# Patient Record
Sex: FEMALE | Race: ASIAN | NOT HISPANIC OR LATINO | URBAN - METROPOLITAN AREA
[De-identification: names, ages, dates, MRNs, and addresses within clinical notes are randomized per-mention and may not be internally consistent; named-entity substitution may affect disease eponyms.]

---

## 2017-05-06 RX ORDER — ALBUTEROL 90 UG/1
0 AEROSOL, METERED ORAL
Qty: 150 | Refills: 0 | COMMUNITY
Start: 2017-05-06

## 2017-05-10 ENCOUNTER — OUTPATIENT (OUTPATIENT)
Dept: OUTPATIENT SERVICES | Facility: HOSPITAL | Age: 82
LOS: 1 days | Discharge: HOME | End: 2017-05-10
Payer: MEDICARE

## 2017-05-10 DIAGNOSIS — R06.09 OTHER FORMS OF DYSPNEA: ICD-10-CM

## 2017-05-10 LAB
BUN SERPL-MCNC: 16 MG/DL — SIGNIFICANT CHANGE UP (ref 7–23)
CALCIUM SERPL-MCNC: 9.3 MG/DL — SIGNIFICANT CHANGE UP (ref 8.4–10.5)
CHLORIDE SERPL-SCNC: 103 MMOL/L — SIGNIFICANT CHANGE UP (ref 98–107)
CO2 SERPL-SCNC: 25 MMOL/L — SIGNIFICANT CHANGE UP (ref 22–31)
CREAT SERPL-MCNC: 1.15 MG/DL — SIGNIFICANT CHANGE UP (ref 0.5–1.3)
GLUCOSE SERPL-MCNC: 162 MG/DL — HIGH (ref 70–99)
HBA1C BLD-MCNC: 6.6 % — HIGH (ref 4–5.6)
HCT VFR BLD CALC: 31.7 % — LOW (ref 34.5–45)
HGB BLD-MCNC: 9.8 G/DL — LOW (ref 11.5–15.5)
MCHC RBC-ENTMCNC: 20.5 PG — LOW (ref 27–34)
MCHC RBC-ENTMCNC: 30.9 % — LOW (ref 32–36)
MCV RBC AUTO: 66.5 FL — LOW (ref 80–100)
PLATELET # BLD AUTO: 261 K/UL — SIGNIFICANT CHANGE UP (ref 150–400)
PMV BLD: 11 FL — SIGNIFICANT CHANGE UP (ref 7–13)
POTASSIUM SERPL-MCNC: 4.4 MMOL/L — SIGNIFICANT CHANGE UP (ref 3.5–5.3)
POTASSIUM SERPL-SCNC: 4.4 MMOL/L — SIGNIFICANT CHANGE UP (ref 3.5–5.3)
RBC # BLD: 4.77 M/UL — SIGNIFICANT CHANGE UP (ref 3.8–5.2)
RBC # FLD: 16.8 % — HIGH (ref 10.3–14.5)
SODIUM SERPL-SCNC: 142 MMOL/L — SIGNIFICANT CHANGE UP (ref 135–145)
WBC # BLD: 7.59 K/UL — SIGNIFICANT CHANGE UP (ref 3.8–10.5)
WBC # FLD AUTO: 7.59 K/UL — SIGNIFICANT CHANGE UP (ref 3.8–10.5)

## 2017-05-10 PROCEDURE — 93460 R&L HRT ART/VENTRICLE ANGIO: CPT | Mod: 26

## 2017-05-10 PROCEDURE — 93010 ELECTROCARDIOGRAM REPORT: CPT

## 2017-05-10 RX ORDER — INSULIN LISPRO 100/ML
VIAL (ML) SUBCUTANEOUS
Qty: 0 | Refills: 0 | Status: DISCONTINUED | OUTPATIENT
Start: 2017-05-10 | End: 2017-05-25

## 2017-05-10 RX ORDER — SODIUM CHLORIDE 9 MG/ML
1000 INJECTION, SOLUTION INTRAVENOUS
Qty: 0 | Refills: 0 | Status: DISCONTINUED | OUTPATIENT
Start: 2017-05-10 | End: 2017-05-25

## 2017-05-10 RX ORDER — DEXTROSE 50 % IN WATER 50 %
12.5 SYRINGE (ML) INTRAVENOUS ONCE
Qty: 0 | Refills: 0 | Status: DISCONTINUED | OUTPATIENT
Start: 2017-05-10 | End: 2017-05-25

## 2017-05-10 RX ORDER — GLUCAGON INJECTION, SOLUTION 0.5 MG/.1ML
1 INJECTION, SOLUTION SUBCUTANEOUS ONCE
Qty: 0 | Refills: 0 | Status: DISCONTINUED | OUTPATIENT
Start: 2017-05-10 | End: 2017-05-25

## 2017-05-10 RX ORDER — DEXTROSE 50 % IN WATER 50 %
25 SYRINGE (ML) INTRAVENOUS ONCE
Qty: 0 | Refills: 0 | Status: DISCONTINUED | OUTPATIENT
Start: 2017-05-10 | End: 2017-05-25

## 2017-05-10 RX ORDER — INSULIN LISPRO 100/ML
VIAL (ML) SUBCUTANEOUS AT BEDTIME
Qty: 0 | Refills: 0 | Status: DISCONTINUED | OUTPATIENT
Start: 2017-05-10 | End: 2017-05-25

## 2017-05-10 RX ORDER — DEXTROSE 50 % IN WATER 50 %
1 SYRINGE (ML) INTRAVENOUS ONCE
Qty: 0 | Refills: 0 | Status: DISCONTINUED | OUTPATIENT
Start: 2017-05-10 | End: 2017-05-25

## 2017-05-10 RX ORDER — ROSUVASTATIN CALCIUM 5 MG/1
1 TABLET ORAL
Qty: 0 | Refills: 0 | COMMUNITY

## 2017-05-10 RX ORDER — ROSUVASTATIN CALCIUM 5 MG/1
0 TABLET ORAL
Qty: 0 | Refills: 0 | COMMUNITY

## 2017-05-10 RX ORDER — SODIUM CHLORIDE 9 MG/ML
3 INJECTION INTRAMUSCULAR; INTRAVENOUS; SUBCUTANEOUS EVERY 8 HOURS
Qty: 0 | Refills: 0 | Status: DISCONTINUED | OUTPATIENT
Start: 2017-05-10 | End: 2017-05-25

## 2017-05-10 NOTE — H&P CARDIOLOGY - NEGATIVE CARDIOVASCULAR SYMPTOMS
no chest pain/no claudication/no palpitations/no paroxysmal nocturnal dyspnea/no peripheral edema/no orthopnea

## 2017-05-10 NOTE — H&P CARDIOLOGY - RS GEN PE MLT RESP DETAILS PC
breath sounds equal/no chest wall tenderness/clear to auscultation bilaterally/respirations non-labored/airway patent/good air movement

## 2017-05-10 NOTE — H&P CARDIOLOGY - HISTORY OF PRESENT ILLNESS
82 y/o F w/ PMH of mod. AS, HTN, HLD and Hypothyroid presents for cardiac catheretization. Pt states that for the past 3 weeks she has been more short of breath on exertion than normal. Pt can now only walk 5-10 steps before having to stop and rest. Pt's symptoms last 10 minutes after stoping to rest. Pt was found to have moderate AS on echo. Pt also had a recent stress test which was normal. Pt denies N/V/D, fevers, chills, cough, palpitations, chest pain, syncope, orthopnea, nocturnal paroxysmal dyspnea, edema, cyanosis, heart murmurs, varicosities, phlebitis, claudication.

## 2017-05-10 NOTE — H&P CARDIOLOGY - NEGATIVE NEUROLOGICAL SYMPTOMS
no loss of consciousness/no difficulty walking/no vertigo/no facial palsy/no transient paralysis/no weakness/no confusion/no syncope/no loss of sensation/no hemiparesis/no paresthesias/no generalized seizures/no focal seizures/no headache/no tremors

## 2017-07-01 ENCOUNTER — OUTPATIENT (OUTPATIENT)
Dept: OUTPATIENT SERVICES | Facility: HOSPITAL | Age: 82
LOS: 1 days | End: 2017-07-01
Payer: MEDICAID

## 2017-07-19 DIAGNOSIS — R69 ILLNESS, UNSPECIFIED: ICD-10-CM

## 2017-08-01 PROCEDURE — G9001: CPT

## 2017-09-28 ENCOUNTER — APPOINTMENT (OUTPATIENT)
Dept: RADIOLOGY | Facility: IMAGING CENTER | Age: 82
End: 2017-09-28
Payer: MEDICARE

## 2017-09-28 ENCOUNTER — OUTPATIENT (OUTPATIENT)
Dept: OUTPATIENT SERVICES | Facility: HOSPITAL | Age: 82
LOS: 1 days | End: 2017-09-28
Payer: COMMERCIAL

## 2017-09-28 DIAGNOSIS — Z00.8 ENCOUNTER FOR OTHER GENERAL EXAMINATION: ICD-10-CM

## 2017-09-28 PROBLEM — Z00.00 ENCOUNTER FOR PREVENTIVE HEALTH EXAMINATION: Status: ACTIVE | Noted: 2017-09-28

## 2017-09-28 PROCEDURE — 71020: CPT | Mod: 26

## 2017-09-28 PROCEDURE — 71046 X-RAY EXAM CHEST 2 VIEWS: CPT

## 2023-04-07 ENCOUNTER — INPATIENT (INPATIENT)
Facility: HOSPITAL | Age: 88
LOS: 7 days | Discharge: NURSING, REHAB OR LONG-TERM CARE | DRG: 202 | End: 2023-04-15
Attending: STUDENT IN AN ORGANIZED HEALTH CARE EDUCATION/TRAINING PROGRAM | Admitting: HOSPITALIST
Payer: COMMERCIAL

## 2023-04-07 VITALS
RESPIRATION RATE: 24 BRPM | HEIGHT: 62 IN | TEMPERATURE: 97 F | OXYGEN SATURATION: 100 % | DIASTOLIC BLOOD PRESSURE: 102 MMHG | WEIGHT: 119.93 LBS | HEART RATE: 108 BPM | SYSTOLIC BLOOD PRESSURE: 160 MMHG

## 2023-04-07 DIAGNOSIS — J45.901 UNSPECIFIED ASTHMA WITH (ACUTE) EXACERBATION: ICD-10-CM

## 2023-04-07 LAB
ALBUMIN SERPL ELPH-MCNC: 2.8 G/DL — LOW (ref 3.3–5)
ALP SERPL-CCNC: 76 U/L — SIGNIFICANT CHANGE UP (ref 40–120)
ALT FLD-CCNC: 7 U/L — LOW (ref 10–45)
ANION GAP SERPL CALC-SCNC: 4 MMOL/L — LOW (ref 5–17)
APPEARANCE UR: CLEAR — SIGNIFICANT CHANGE UP
APTT BLD: 31.7 SEC — SIGNIFICANT CHANGE UP (ref 27.5–35.5)
AST SERPL-CCNC: 9 U/L — LOW (ref 10–40)
BACTERIA # UR AUTO: ABNORMAL /HPF
BASE EXCESS BLDA CALC-SCNC: 3.1 MMOL/L — HIGH (ref -2–3)
BASOPHILS # BLD AUTO: 0.07 K/UL — SIGNIFICANT CHANGE UP (ref 0–0.2)
BASOPHILS NFR BLD AUTO: 0.5 % — SIGNIFICANT CHANGE UP (ref 0–2)
BILIRUB SERPL-MCNC: 0.2 MG/DL — SIGNIFICANT CHANGE UP (ref 0.2–1.2)
BILIRUB UR-MCNC: NEGATIVE — SIGNIFICANT CHANGE UP
BUN SERPL-MCNC: 32 MG/DL — HIGH (ref 7–23)
BUN SERPL-MCNC: 33 MG/DL — HIGH (ref 7–23)
CALCIUM SERPL-MCNC: 8.6 MG/DL — SIGNIFICANT CHANGE UP (ref 8.4–10.5)
CALCIUM SERPL-MCNC: 8.7 MG/DL — SIGNIFICANT CHANGE UP (ref 8.4–10.5)
CHLORIDE SERPL-SCNC: 94 MMOL/L — LOW (ref 96–108)
CO2 BLDA-SCNC: 29 MMOL/L — HIGH (ref 19–24)
CO2 SERPL-SCNC: 32 MMOL/L — HIGH (ref 22–31)
COLOR SPEC: YELLOW — SIGNIFICANT CHANGE UP
CREAT SERPL-MCNC: 1.64 MG/DL — HIGH (ref 0.5–1.3)
CREAT SERPL-MCNC: 1.66 MG/DL — HIGH (ref 0.5–1.3)
D DIMER BLD IA.RAPID-MCNC: 506 NG/ML DDU — HIGH
DIFF PNL FLD: ABNORMAL
EGFR: 29 ML/MIN/1.73M2 — LOW
EGFR: 30 ML/MIN/1.73M2 — LOW
EOSINOPHIL # BLD AUTO: 0.16 K/UL — SIGNIFICANT CHANGE UP (ref 0–0.5)
EOSINOPHIL NFR BLD AUTO: 1.2 % — SIGNIFICANT CHANGE UP (ref 0–6)
EPI CELLS # UR: SIGNIFICANT CHANGE UP
FLUAV AG NPH QL: SIGNIFICANT CHANGE UP
FLUBV AG NPH QL: SIGNIFICANT CHANGE UP
GAS PNL BLDA: SIGNIFICANT CHANGE UP
GLUCOSE BLDC GLUCOMTR-MCNC: 142 MG/DL — HIGH (ref 70–99)
GLUCOSE SERPL-MCNC: 108 MG/DL — HIGH (ref 70–99)
GLUCOSE SERPL-MCNC: 121 MG/DL — HIGH (ref 70–99)
GLUCOSE UR QL: NEGATIVE — SIGNIFICANT CHANGE UP
HCO3 BLDA-SCNC: 28 MMOL/L — SIGNIFICANT CHANGE UP (ref 21–28)
HCT VFR BLD CALC: 27.5 % — LOW (ref 34.5–45)
HGB BLD-MCNC: 8.4 G/DL — LOW (ref 11.5–15.5)
HOROWITZ INDEX BLDA+IHG-RTO: 40 — SIGNIFICANT CHANGE UP
IMM GRANULOCYTES NFR BLD AUTO: 0.6 % — SIGNIFICANT CHANGE UP (ref 0–0.9)
INR BLD: 0.95 RATIO — SIGNIFICANT CHANGE UP (ref 0.88–1.16)
KETONES UR-MCNC: NEGATIVE — SIGNIFICANT CHANGE UP
LACTATE SERPL-SCNC: 0.9 MMOL/L — SIGNIFICANT CHANGE UP (ref 0.7–2)
LEUKOCYTE ESTERASE UR-ACNC: ABNORMAL
LYMPHOCYTES # BLD AUTO: 1.57 K/UL — SIGNIFICANT CHANGE UP (ref 1–3.3)
LYMPHOCYTES # BLD AUTO: 12.1 % — LOW (ref 13–44)
MCHC RBC-ENTMCNC: 20.3 PG — LOW (ref 27–34)
MCHC RBC-ENTMCNC: 30.5 GM/DL — LOW (ref 32–36)
MCV RBC AUTO: 66.4 FL — LOW (ref 80–100)
MONOCYTES # BLD AUTO: 0.71 K/UL — SIGNIFICANT CHANGE UP (ref 0–0.9)
MONOCYTES NFR BLD AUTO: 5.5 % — SIGNIFICANT CHANGE UP (ref 2–14)
NEUTROPHILS # BLD AUTO: 10.4 K/UL — HIGH (ref 1.8–7.4)
NEUTROPHILS NFR BLD AUTO: 80.1 % — HIGH (ref 43–77)
NITRITE UR-MCNC: NEGATIVE — SIGNIFICANT CHANGE UP
NRBC # BLD: 0 /100 WBCS — SIGNIFICANT CHANGE UP (ref 0–0)
NT-PROBNP SERPL-SCNC: 8882 PG/ML — HIGH (ref 0–300)
PCO2 BLDA: 45 MMHG — HIGH (ref 32–35)
PH BLDA: 7.4 — SIGNIFICANT CHANGE UP (ref 7.35–7.45)
PH UR: 7 — SIGNIFICANT CHANGE UP (ref 5–8)
PLATELET # BLD AUTO: 358 K/UL — SIGNIFICANT CHANGE UP (ref 150–400)
PO2 BLDA: 196 MMHG — HIGH (ref 83–108)
POTASSIUM SERPL-MCNC: 5.5 MMOL/L — HIGH (ref 3.5–5.3)
POTASSIUM SERPL-MCNC: 5.7 MMOL/L — HIGH (ref 3.5–5.3)
POTASSIUM SERPL-SCNC: 5.5 MMOL/L — HIGH (ref 3.5–5.3)
POTASSIUM SERPL-SCNC: 5.7 MMOL/L — HIGH (ref 3.5–5.3)
PROT SERPL-MCNC: 6.5 G/DL — SIGNIFICANT CHANGE UP (ref 6–8.3)
PROT UR-MCNC: 30 MG/DL
PROTHROM AB SERPL-ACNC: 11 SEC — SIGNIFICANT CHANGE UP (ref 10.5–13.4)
RBC # BLD: 4.14 M/UL — SIGNIFICANT CHANGE UP (ref 3.8–5.2)
RBC # FLD: 17.4 % — HIGH (ref 10.3–14.5)
RBC CASTS # UR COMP ASSIST: ABNORMAL /HPF (ref 0–4)
RSV RNA NPH QL NAA+NON-PROBE: DETECTED
SAO2 % BLDA: 99.7 % — HIGH (ref 94–98)
SARS-COV-2 RNA SPEC QL NAA+PROBE: SIGNIFICANT CHANGE UP
SODIUM SERPL-SCNC: 130 MMOL/L — LOW (ref 135–145)
SP GR SPEC: 1.01 — SIGNIFICANT CHANGE UP (ref 1.01–1.02)
TROPONIN I, HIGH SENSITIVITY RESULT: 18.9 NG/L — SIGNIFICANT CHANGE UP
UROBILINOGEN FLD QL: NEGATIVE — SIGNIFICANT CHANGE UP
WBC # BLD: 12.99 K/UL — HIGH (ref 3.8–10.5)
WBC # FLD AUTO: 12.99 K/UL — HIGH (ref 3.8–10.5)
WBC UR QL: ABNORMAL /HPF (ref 0–5)

## 2023-04-07 PROCEDURE — 93010 ELECTROCARDIOGRAM REPORT: CPT

## 2023-04-07 PROCEDURE — 99222 1ST HOSP IP/OBS MODERATE 55: CPT

## 2023-04-07 PROCEDURE — 99285 EMERGENCY DEPT VISIT HI MDM: CPT

## 2023-04-07 PROCEDURE — 71045 X-RAY EXAM CHEST 1 VIEW: CPT | Mod: 26

## 2023-04-07 RX ORDER — DEXTROSE 50 % IN WATER 50 %
25 SYRINGE (ML) INTRAVENOUS ONCE
Refills: 0 | Status: DISCONTINUED | OUTPATIENT
Start: 2023-04-07 | End: 2023-04-15

## 2023-04-07 RX ORDER — LEVOTHYROXINE SODIUM 125 MCG
25 TABLET ORAL DAILY
Refills: 0 | Status: DISCONTINUED | OUTPATIENT
Start: 2023-04-07 | End: 2023-04-15

## 2023-04-07 RX ORDER — APIXABAN 2.5 MG/1
2.5 TABLET, FILM COATED ORAL EVERY 12 HOURS
Refills: 0 | Status: DISCONTINUED | OUTPATIENT
Start: 2023-04-07 | End: 2023-04-09

## 2023-04-07 RX ORDER — DEXTROSE 50 % IN WATER 50 %
12.5 SYRINGE (ML) INTRAVENOUS ONCE
Refills: 0 | Status: DISCONTINUED | OUTPATIENT
Start: 2023-04-07 | End: 2023-04-15

## 2023-04-07 RX ORDER — ONDANSETRON 8 MG/1
4 TABLET, FILM COATED ORAL EVERY 8 HOURS
Refills: 0 | Status: DISCONTINUED | OUTPATIENT
Start: 2023-04-07 | End: 2023-04-15

## 2023-04-07 RX ORDER — ASPIRIN/CALCIUM CARB/MAGNESIUM 324 MG
81 TABLET ORAL DAILY
Refills: 0 | Status: DISCONTINUED | OUTPATIENT
Start: 2023-04-07 | End: 2023-04-07

## 2023-04-07 RX ORDER — DEXAMETHASONE 0.5 MG/5ML
10 ELIXIR ORAL ONCE
Refills: 0 | Status: COMPLETED | OUTPATIENT
Start: 2023-04-07 | End: 2023-04-07

## 2023-04-07 RX ORDER — INSULIN LISPRO 100/ML
VIAL (ML) SUBCUTANEOUS AT BEDTIME
Refills: 0 | Status: DISCONTINUED | OUTPATIENT
Start: 2023-04-07 | End: 2023-04-15

## 2023-04-07 RX ORDER — INSULIN LISPRO 100/ML
VIAL (ML) SUBCUTANEOUS
Refills: 0 | Status: DISCONTINUED | OUTPATIENT
Start: 2023-04-07 | End: 2023-04-15

## 2023-04-07 RX ORDER — LANOLIN ALCOHOL/MO/W.PET/CERES
3 CREAM (GRAM) TOPICAL AT BEDTIME
Refills: 0 | Status: DISCONTINUED | OUTPATIENT
Start: 2023-04-07 | End: 2023-04-15

## 2023-04-07 RX ORDER — AMLODIPINE BESYLATE 2.5 MG/1
5 TABLET ORAL DAILY
Refills: 0 | Status: DISCONTINUED | OUTPATIENT
Start: 2023-04-07 | End: 2023-04-15

## 2023-04-07 RX ORDER — FUROSEMIDE 40 MG
20 TABLET ORAL ONCE
Refills: 0 | Status: COMPLETED | OUTPATIENT
Start: 2023-04-07 | End: 2023-04-07

## 2023-04-07 RX ORDER — IPRATROPIUM/ALBUTEROL SULFATE 18-103MCG
3 AEROSOL WITH ADAPTER (GRAM) INHALATION
Refills: 0 | Status: COMPLETED | OUTPATIENT
Start: 2023-04-07 | End: 2023-04-07

## 2023-04-07 RX ORDER — ALBUTEROL 90 UG/1
1 AEROSOL, METERED ORAL EVERY 4 HOURS
Refills: 0 | Status: DISCONTINUED | OUTPATIENT
Start: 2023-04-07 | End: 2023-04-13

## 2023-04-07 RX ORDER — ATORVASTATIN CALCIUM 80 MG/1
20 TABLET, FILM COATED ORAL AT BEDTIME
Refills: 0 | Status: DISCONTINUED | OUTPATIENT
Start: 2023-04-07 | End: 2023-04-15

## 2023-04-07 RX ORDER — ALBUTEROL 90 UG/1
2.5 AEROSOL, METERED ORAL EVERY 6 HOURS
Refills: 0 | Status: DISCONTINUED | OUTPATIENT
Start: 2023-04-07 | End: 2023-04-15

## 2023-04-07 RX ORDER — ACETAMINOPHEN 500 MG
650 TABLET ORAL EVERY 6 HOURS
Refills: 0 | Status: DISCONTINUED | OUTPATIENT
Start: 2023-04-07 | End: 2023-04-15

## 2023-04-07 RX ORDER — SODIUM CHLORIDE 9 MG/ML
1000 INJECTION, SOLUTION INTRAVENOUS
Refills: 0 | Status: DISCONTINUED | OUTPATIENT
Start: 2023-04-07 | End: 2023-04-15

## 2023-04-07 RX ORDER — GLUCAGON INJECTION, SOLUTION 0.5 MG/.1ML
1 INJECTION, SOLUTION SUBCUTANEOUS ONCE
Refills: 0 | Status: DISCONTINUED | OUTPATIENT
Start: 2023-04-07 | End: 2023-04-15

## 2023-04-07 RX ORDER — DEXTROSE 50 % IN WATER 50 %
15 SYRINGE (ML) INTRAVENOUS ONCE
Refills: 0 | Status: DISCONTINUED | OUTPATIENT
Start: 2023-04-07 | End: 2023-04-15

## 2023-04-07 RX ADMIN — ATORVASTATIN CALCIUM 20 MILLIGRAM(S): 80 TABLET, FILM COATED ORAL at 23:17

## 2023-04-07 RX ADMIN — ALBUTEROL 2.5 MILLIGRAM(S): 90 AEROSOL, METERED ORAL at 22:00

## 2023-04-07 RX ADMIN — Medication 20 MILLIGRAM(S): at 16:14

## 2023-04-07 RX ADMIN — Medication 3 MILLILITER(S): at 17:22

## 2023-04-07 RX ADMIN — Medication 102 MILLIGRAM(S): at 16:14

## 2023-04-07 RX ADMIN — Medication 3 MILLILITER(S): at 16:15

## 2023-04-07 NOTE — ED ADULT NURSE NOTE - INTERVENTIONS DEFINITIONS
[place] to call system/Call bell, personal items and telephone within reach/Instruct patient to call for assistance/Physically safe environment: no spills, clutter or unnecessary equipment/Provide visual cue, wrist band, yellow gown, etc./Monitor for mental status changes and reorient to person, place, and time [place] to call system/Call bell, personal items and telephone within reach/Instruct patient to call for assistance/Physically safe environment: no spills, clutter or unnecessary equipment/Provide visual cue, wrist band, yellow gown, etc./Monitor for mental status changes and reorient to person, place, and time [place] to call system/Call bell, personal items and telephone within reach/Instruct patient to call for assistance/Physically safe environment: no spills, clutter or unnecessary equipment/Provide visual cue, wrist band, yellow gown, etc./Monitor for mental status changes and reorient to person, place, and time

## 2023-04-07 NOTE — H&P ADULT - ASSESSMENT
89F (Gujarati-speaking) with HTN, asthma, DM2, hypothyroidism, recent hospitalization in [place] for "trouble breathing", returned from [place] 5 days ago, comes to the ED with SOB, diagnosed with asthma exacerbation secondary to RSV    #Possible acute hypoxic respiratory failure  #Asthma exacerbation  #RSV infection  -Will check CT chest non-contrast as the CXR does not show any evidence of acute pulmonary disease  -IV Solumedrol for now  -Would change to PO steroid in 24-48 hours as long as lung exam is improved  -Albuterol nebs q6h for now  -However, will also check d-dimer... if d-dimer is positive, would recommend V/Q scan - (patient recently on plane ride from [place])    #Essential HTN  -c/w Norvasc, Clonidine    #Hypothyroidism  -c/w Synthroid  -check TSH    #HLD  -c/w statin    #DM2  -hold oral meds (Metformin, Glimeperide)  -start ISS  -POCT glucose testing  -Check A1C    #PREM vs CKD3  -Prior Cr 1.15 (2017) and 1.55 (2022)  -Unclear if we are dealing with PREM on CKD3, vs if this is the new baseline  -Hold Metformin  -Repeat BMP in AM    #Anemia of chronic diseaes  -Prior Hb 8-9  -Stable at this time    #DVT ppx: HSQ 89F (Gujarati-speaking) with HTN, asthma, DM2, hypothyroidism, recent hospitalization in [place] for "trouble breathing", returned from [place] 5 days ago, comes to the ED with SOB, diagnosed with asthma exacerbation secondary to RSV    #Possible acute hypoxic respiratory failure  #Asthma exacerbation  #RSV infection  -Will check CT chest non-contrast as the CXR does not show any evidence of acute pulmonary disease  -PO steroids for now (got IV in the ED).... taper schedule to be determined  -Would change to PO steroid in 24-48 hours as long as lung exam is improved  -Albuterol nebs q6h for now  -However, will also check d-dimer... if d-dimer is positive, would recommend V/Q scan - (patient recently on plane ride from [place])    #Essential HTN  -c/w Norvasc, Clonidine    #Hypothyroidism  -c/w Synthroid  -check TSH    #Bilateral leg swelling  -Check US to r/o DVT (recent plane ride)  -Could be from low albumen state    #HLD  -c/w statin    #DM2  -hold oral meds (Metformin, Glimeperide)  -start ISS  -POCT glucose testing  -Check A1C    #PREM vs CKD3  -Prior Cr 1.15 (2017) and 1.55 (2022)  -Unclear if we are dealing with PREM on CKD3, vs if this is the new baseline  -Hold Metformin  -Repeat BMP in AM    #Anemia of chronic diseaes  -Prior Hb 8-9  -Stable at this time    #DVT ppx: HSQ 89F (Gujarati-speaking) with HTN, asthma, DM2, hypothyroidism, recent hospitalization in [place] for "trouble breathing", returned from [place] 5 days ago, comes to the ED with SOB, diagnosed with asthma exacerbation secondary to RSV    #Possible acute hypoxic respiratory failure  #Asthma exacerbation  #RSV infection  -Will check CT chest non-contrast as the CXR does not show any evidence of acute pulmonary disease  -PO steroids for now (got IV in the ED).... taper schedule to be determined  -Would change to PO steroid in 24-48 hours as long as lung exam is improved  -Albuterol nebs q6h for now  -However, will also check d-dimer... if d-dimer is positive, would recommend V/Q scan - (patient recently on plane ride from [place])    #New A-fib  -Will start Eliquis  -Cardio consult  -Echo  -troponin  -d-dimer... if positive, would consider V/Q... although can start with leg dopplers and echo to assess for RV strain - will already be on A/C regardless    #Essential HTN  -c/w Norvasc, Clonidine    #Hypothyroidism  -c/w Synthroid  -check TSH    #Hyperkalemia  -Mild, repeat BMP, no EKG changes  -If remains high, will give one dose of Lokelma     #Bilateral leg swelling  -Check US to r/o DVT (recent plane ride)  -Could be from low albumen state  -does not appear to be in overt heart failure at this time despite elevated pro-BNP    #HLD  -c/w statin    #DM2  -hold oral meds (Metformin, Glimeperide)  -start ISS  -POCT glucose testing  -Check A1C    #PREM vs CKD3  -Prior Cr 1.15 (2017) and 1.55 (2022)  -Unclear if we are dealing with PREM on CKD3, vs if this is the new baseline  -Hold Metformin  -Repeat BMP in AM    #Anemia of chronic disease  -Prior Hb 8-9  -Stable at this time    #DVT ppx: Eliquis    Case d/w patient's PMD, Dr. Merchant, [phone], who is involved in her care and would appreciate any updates  Case d/w patient's son, Martin Santana, [phone]    FULL CODE at this time 89F (Gujarati-speaking) with HTN, asthma, DM2, hypothyroidism, recent hospitalization in [place] for "trouble breathing", returned from [place] 5 days ago, comes to the ED with SOB, diagnosed with asthma exacerbation secondary to RSV    #Possible acute hypoxic respiratory failure  #Asthma exacerbation  #RSV infection  -Will check CT chest non-contrast as the CXR does not show any evidence of acute pulmonary disease  -PO steroids for now (got IV in the ED).... taper schedule to be determined  -Would change to PO steroid in 24-48 hours as long as lung exam is improved  -Albuterol nebs q6h for now  -However, will also check d-dimer... if d-dimer is positive, would recommend V/Q scan - (patient recently on plane ride from [place])    #New A-fib  -Will start Eliquis  -Cardio consult  -Echo  -troponin  -d-dimer... if positive, would consider V/Q... although can start with leg dopplers and echo to assess for RV strain - will already be on A/C regardless    #Essential HTN  -c/w Norvasc, Clonidine    #Hypothyroidism  -c/w Synthroid  -check TSH    #Hyperkalemia  -Mild, repeat BMP, no EKG changes  -If remains high, will give one dose of Lokelma     #Bilateral leg swelling  -Check US to r/o DVT (recent plane ride)  -Could be from low albumen state  -does not appear to be in overt heart failure at this time despite elevated pro-BNP    #HLD  -c/w statin    #DM2  -hold oral meds (Metformin, Glimeperide)  -start ISS  -POCT glucose testing  -Check A1C    #PREM vs CKD3  -Prior Cr 1.15 (2017) and 1.55 (2022)  -Unclear if we are dealing with PREM on CKD3, vs if this is the new baseline  -Hold Metformin  -Repeat BMP in AM    #Anemia of chronic disease  -Prior Hb 8-9  -Stable at this time    #DVT ppx: Eliquis    Case d/w patient's PMD, Dr. Merchant, [phone], who is involved in her care and would appreciate any updates  Case d/w patient's son, Martin Santana, [phone]    FULL CODE at this time 89F (Gujarati-speaking) with HTN, asthma, DM2, hypothyroidism, recent hospitalization in [place] for "trouble breathing", returned from [place] 5 days ago, comes to the ED with SOB, diagnosed with asthma exacerbation secondary to RSV    #Possible acute hypoxic respiratory failure  #Asthma exacerbation  #RSV infection  -Will check CT chest non-contrast as the CXR does not show any evidence of acute pulmonary disease  -PO steroids for now (got IV in the ED).... taper schedule to be determined  -Would change to PO steroid in 24-48 hours as long as lung exam is improved  -Albuterol nebs q6h for now  -However, will also check d-dimer... if d-dimer is positive, would recommend V/Q scan - (patient recently on plane ride from [place])    #New A-fib  -Will start Eliquis  -Cardio consult  -Echo  -troponin  -d-dimer... if positive, would consider V/Q... although can start with leg dopplers and echo to assess for RV strain - will already be on A/C regardless    #Essential HTN  -c/w Norvasc, Clonidine    #Hypothyroidism  -c/w Synthroid  -check TSH    #Hyperkalemia  -Mild, repeat BMP, no EKG changes  -If remains high, will give one dose of Lokelma     #Bilateral leg swelling  -Check US to r/o DVT (recent plane ride)  -Could be from low albumen state  -does not appear to be in overt heart failure at this time despite elevated pro-BNP    #HLD  -c/w statin    #DM2  -hold oral meds (Metformin, Glimeperide)  -start ISS  -POCT glucose testing  -Check A1C    #PREM vs CKD3  -Prior Cr 1.15 (2017) and 1.55 (2022)  -Unclear if we are dealing with PREM on CKD3, vs if this is the new baseline  -Hold Metformin  -Repeat BMP in AM    #Anemia of chronic disease  -Prior Hb 8-9  -Stable at this time    #DVT ppx: Eliquis    Case d/w patient's PMD, Dr. Merchant, [phone], who is involved in her care and would appreciate any updates  Case d/w patient's son, Martin Santana, [phone]    FULL CODE at this time 89F (Gujarati-speaking) with HTN, asthma, DM2, hypothyroidism, recent hospitalization in [place] for "trouble breathing", returned from [place] 5 days ago, comes to the ED with SOB, diagnosed with asthma exacerbation secondary to RSV    #Possible acute hypoxic respiratory failure  #Asthma exacerbation  #RSV infection  -Will check CT chest non-contrast as the CXR does not show any evidence of acute pulmonary disease  -PO steroids for now (got IV in the ED).... taper schedule to be determined  -Would change to PO steroid in 24-48 hours as long as lung exam is improved  -Albuterol nebs q6h for now  -However, will also check d-dimer... if d-dimer is positive, would recommend V/Q scan - (patient recently on plane ride from [place])    #New A-fib  -Will start Eliquis (will stop ASA for now, no hx of CAD or stroke, risk of bleeding on ASA + Eliquis > benefits)  -Cardio consult  -Echo  -troponin  -d-dimer... if positive, would consider V/Q... although can start with leg dopplers and echo to assess for RV strain - will already be on A/C regardless    #Essential HTN  -c/w Norvasc, Clonidine    #Hypothyroidism  -c/w Synthroid  -check TSH    #Hyperkalemia  -Mild, repeat BMP, no EKG changes  -If remains high, will give one dose of Lokelma     #Bilateral leg swelling  -Check US to r/o DVT (recent plane ride)  -Could be from low albumen state  -does not appear to be in overt heart failure at this time despite elevated pro-BNP    #HLD  -c/w statin    #DM2  -hold oral meds (Metformin, Glimeperide)  -start ISS  -POCT glucose testing  -Check A1C    #PREM vs CKD3  -Prior Cr 1.15 (2017) and 1.55 (2022)  -Unclear if we are dealing with PREM on CKD3, vs if this is the new baseline  -Hold Metformin  -Repeat BMP in AM    #Anemia of chronic disease  -Prior Hb 8-9  -Stable at this time    #DVT ppx: Eliquis    Case d/w patient's PMD, Dr. Merchant, [phone], who is involved in her care and would appreciate any updates  Case d/w patient's son, Martin Santana, [phone]    FULL CODE at this time 89F (Gujarati-speaking) with HTN, asthma, DM2, hypothyroidism, recent hospitalization in [place] for "trouble breathing", returned from [place] 5 days ago, comes to the ED with SOB, diagnosed with asthma exacerbation secondary to RSV    #Possible acute hypoxic respiratory failure  #Asthma exacerbation  #RSV infection  -Will check CT chest non-contrast as the CXR does not show any evidence of acute pulmonary disease  -PO steroids for now (got IV in the ED).... taper schedule to be determined  -Would change to PO steroid in 24-48 hours as long as lung exam is improved  -Albuterol nebs q6h for now  -However, will also check d-dimer... if d-dimer is positive, would recommend V/Q scan - (patient recently on plane ride from [place])    #New A-fib  -Will start Eliquis (will stop ASA for now, no hx of CAD or stroke, risk of bleeding on ASA + Eliquis > benefits)  -Cardio consult  -Echo  -troponin  -d-dimer... if positive, would consider V/Q... although can start with leg dopplers and echo to assess for RV strain - will already be on A/C regardless    #Essential HTN  -c/w Norvasc, Clonidine    #Hypothyroidism  -c/w Synthroid  -check TSH    #Hyperkalemia  -Mild, repeat BMP, no EKG changes  -If remains high, will give one dose of Lokelma     #Bilateral leg swelling  -Check US to r/o DVT (recent plane ride)  -Could be from low albumen state  -does not appear to be in overt heart failure at this time despite elevated pro-BNP    #HLD  -c/w statin    #DM2  -hold oral meds (Metformin, Glimeperide)  -start ISS  -POCT glucose testing  -Check A1C    #PREM vs CKD3  -Prior Cr 1.15 (2017) and 1.55 (2022)  -Unclear if we are dealing with PREM on CKD3, vs if this is the new baseline  -Hold Metformin  -Repeat BMP in AM    #Anemia of chronic disease  -Prior Hb 8-9  -Stable at this time    #DVT ppx: Eliquis    Case d/w patient's PMD, Dr. Merchant, [phone], who is involved in her care and would appreciate any updates  Case d/w patient's son, Martin Santana, [phone]    FULL CODE at this time 89F (Gujarati-speaking) with HTN, asthma, DM2, hypothyroidism, recent hospitalization in [place] for "trouble breathing", returned from [place] 5 days ago, comes to the ED with SOB, diagnosed with asthma exacerbation secondary to RSV    #Possible acute hypoxic respiratory failure  #Asthma exacerbation  #RSV infection  -Will check CT chest non-contrast as the CXR does not show any evidence of acute pulmonary disease  -PO steroids for now (got IV in the ED).... taper schedule to be determined  -Would change to PO steroid in 24-48 hours as long as lung exam is improved  -Albuterol nebs q6h for now  -However, will also check d-dimer... if d-dimer is positive, would recommend V/Q scan - (patient recently on plane ride from [place])    #New A-fib  -Will start Eliquis (will stop ASA for now, no hx of CAD or stroke, risk of bleeding on ASA + Eliquis > benefits)  -Cardio consult  -Echo  -troponin  -d-dimer... if positive, would consider V/Q... although can start with leg dopplers and echo to assess for RV strain - will already be on A/C regardless    #Essential HTN  -c/w Norvasc, Clonidine    #Hypothyroidism  -c/w Synthroid  -check TSH    #Hyperkalemia  -Mild, repeat BMP, no EKG changes  -If remains high, will give one dose of Lokelma     #Bilateral leg swelling  -Check US to r/o DVT (recent plane ride)  -Could be from low albumen state  -does not appear to be in overt heart failure at this time despite elevated pro-BNP    #HLD  -c/w statin    #DM2  -hold oral meds (Metformin, Glimeperide)  -start ISS  -POCT glucose testing  -Check A1C    #PREM vs CKD3  -Prior Cr 1.15 (2017) and 1.55 (2022)  -Unclear if we are dealing with PREM on CKD3, vs if this is the new baseline  -Hold Metformin  -Repeat BMP in AM    #Anemia of chronic disease  -Prior Hb 8-9  -Stable at this time    #DVT ppx: Eliquis    Case d/w patient's PMD, Dr. Merchant, [phone], who is involved in her care and would appreciate any updates  Case d/w patient's son, Martin Santana, [phone]    FULL CODE at this time 89F (Gujarati-speaking) with HTN, asthma, DM2, hypothyroidism, recent hospitalization in [place] for "trouble breathing", returned from [place] 5 days ago, comes to the ED with SOB, diagnosed with asthma exacerbation secondary to RSV    #Asthma exacerbation  #RSV infection  -Patient satting 100% on RA on my exam  -Will check CT chest non-contrast as the CXR does not show any evidence of acute pulmonary disease, but has been hospitalized twice now (here, and recently in [place]) for acute respiratory distress  -PO steroids for now (got IV in the ED).... taper schedule to be determined  -Would change to PO steroid in 24-48 hours as long as lung exam is improved  -Albuterol nebs q6h for now  -However, will also check d-dimer... if d-dimer is positive, would recommend V/Q scan - (patient recently on plane ride from [place])    #New A-fib  -Will start Eliquis (will stop ASA for now, no hx of CAD or stroke, risk of bleeding on ASA + Eliquis > benefits)  -Cardio consult  -Echo  -troponin  -d-dimer... if positive, would consider V/Q... although can start with leg dopplers and echo to assess for RV strain - will already be on A/C regardless    #Essential HTN  -c/w Norvasc, Clonidine    #Hypothyroidism  -c/w Synthroid  -check TSH    #Hyperkalemia  -Mild, repeat BMP, no EKG changes  -If remains high, will give one dose of Lokelma     #Bilateral leg swelling  -Check US to r/o DVT (recent plane ride)  -Could be from low albumen state  -does not appear to be in overt heart failure at this time despite elevated pro-BNP    #HLD  -c/w statin    #DM2  -hold oral meds (Metformin, Glimeperide)  -start ISS  -POCT glucose testing  -Check A1C    #PREM vs CKD3  -Prior Cr 1.15 (2017) and 1.55 (2022)  -Unclear if we are dealing with PREM on CKD3, vs if this is the new baseline  -Hold Metformin  -Repeat BMP in AM    #Anemia of chronic disease  -Prior Hb 8-9  -Stable at this time    #DVT ppx: Eliquis    Case d/w patient's PMD, Dr. Merchant, [phone], who is involved in her care and would appreciate any updates  Case d/w patient's son, Martin Santana, [phone]    FULL CODE at this time 89F (Gujarati-speaking) with HTN, asthma, DM2, hypothyroidism, recent hospitalization in [place] for "trouble breathing", returned from [place] 5 days ago, comes to the ED with SOB, diagnosed with asthma exacerbation secondary to RSV    #Asthma exacerbation  #RSV infection  -Patient satting 100% on RA on my exam  -Will check CT chest non-contrast as the CXR does not show any evidence of acute pulmonary disease, but has been hospitalized twice now (here, and recently in [place]) for acute respiratory distress  -PO steroids for now (got IV in the ED).... taper schedule to be determined  -Would change to PO steroid in 24-48 hours as long as lung exam is improved  -Albuterol nebs q6h for now  -However, will also check d-dimer... if d-dimer is positive, would recommend V/Q scan - (patient recently on plane ride from [place])    #New A-fib  -Will start Eliquis (will stop ASA for now, no hx of CAD or stroke, risk of bleeding on ASA + Eliquis > benefits)  -Cardio consult  -Echo  -troponin  -d-dimer... if positive, would consider V/Q... although can start with leg dopplers and echo to assess for RV strain - will already be on A/C regardless    #Essential HTN  -c/w Norvasc, Clonidine    #Hypothyroidism  -c/w Synthroid  -check TSH    #Hyperkalemia  -Mild, repeat BMP, no EKG changes  -If remains high, will give one dose of Lokelma     #Bilateral leg swelling  -Check US to r/o DVT (recent plane ride)  -Could be from low albumen state  -does not appear to be in overt heart failure at this time despite elevated pro-BNP    #HLD  -c/w statin    #DM2  -hold oral meds (Metformin, Glimeperide)  -start ISS  -POCT glucose testing  -Check A1C    #PREM vs CKD3  -Prior Cr 1.15 (2017) and 1.55 (2022)  -Unclear if we are dealing with PREM on CKD3, vs if this is the new baseline  -Hold Metformin  -Repeat BMP in AM    #Anemia of chronic disease  -Prior Hb 8-9  -Stable at this time    #DVT ppx: Eliquis    Case d/w patient's PMD, Dr. Merchant, [phone], who is involved in her care and would appreciate any updates  Case d/w patient's son, Martin Santana, [phone]    FULL CODE at this time 89F (Gujarati-speaking) with HTN, asthma, DM2, hypothyroidism, recent hospitalization in [place] for "trouble breathing", returned from [place] 5 days ago, comes to the ED with SOB, diagnosed with asthma exacerbation secondary to RSV    #Asthma exacerbation  #RSV infection  -Patient satting 100% on RA on my exam  -Will check CT chest non-contrast as the CXR does not show any evidence of acute pulmonary disease, but has been hospitalized twice now (here, and recently in [place]) for acute respiratory distress  -PO steroids for now (got IV in the ED).... taper schedule to be determined  -Would change to PO steroid in 24-48 hours as long as lung exam is improved  -Albuterol nebs q6h for now  -However, will also check d-dimer... if d-dimer is positive, would recommend V/Q scan - (patient recently on plane ride from [place])    #New A-fib  -Will start Eliquis (will stop ASA for now, no hx of CAD or stroke, risk of bleeding on ASA + Eliquis > benefits)  -Cardio consult  -Echo  -troponin  -d-dimer... if positive, would consider V/Q... although can start with leg dopplers and echo to assess for RV strain - will already be on A/C regardless    #Essential HTN  -c/w Norvasc, Clonidine    #Hypothyroidism  -c/w Synthroid  -check TSH    #Hyperkalemia  -Mild, repeat BMP, no EKG changes  -If remains high, will give one dose of Lokelma     #Bilateral leg swelling  -Check US to r/o DVT (recent plane ride)  -Could be from low albumen state  -does not appear to be in overt heart failure at this time despite elevated pro-BNP    #HLD  -c/w statin    #DM2  -hold oral meds (Metformin, Glimeperide)  -start ISS  -POCT glucose testing  -Check A1C    #PREM vs CKD3  -Prior Cr 1.15 (2017) and 1.55 (2022)  -Unclear if we are dealing with PREM on CKD3, vs if this is the new baseline  -Hold Metformin  -Repeat BMP in AM    #Anemia of chronic disease  -Prior Hb 8-9  -Stable at this time    #DVT ppx: Eliquis    Case d/w patient's PMD, Dr. Merchant, [phone], who is involved in her care and would appreciate any updates  Case d/w patient's son, Martin Santana, [phone]    FULL CODE at this time

## 2023-04-07 NOTE — ED PROVIDER NOTE - PHYSICAL EXAMINATION
General:     In respiratory distress on CPAP by the EMS  Head:     NC/AT, EOMI, oral mucosa moist  Neck:     trachea midline  Lungs:    bilateral wheezing  CVS:     S1S2, RRR, no m/g/r  Abd:     +BS, s/nt/nd, no organomegaly  Ext:    2+ radial and pedal pulses, no c/c/e  Neuro: AAOx3, no sensory/motor deficits

## 2023-04-07 NOTE — H&P ADULT - NSHPOUTPATIENTPROVIDERS_GEN_ALL_CORE
Dr. Yovany Merchant - [phone] - notified Dr. Yovany Merchant - [phone] - notified Dr. Yovany Merchant - [phone] - notified

## 2023-04-07 NOTE — ED PROVIDER NOTE - OBJECTIVE STATEMENT
89-year-old female [place] 5 [place] ago she came back from [place] chief complaint respiratory distress brought in by the EMS on a CPAP According to the family patient has a history of asthma hypertension diabetes hypothyroidism and dyslipidemia patient is on amlodipine metformin levothyroxine clonidine albuterol and rosuvastatin As per the patient's son during his stay in [facility] few days prior to arrival she was in respiratory distress her pulse ox was 40% she was transferred to the hospital and was admitted and then later on discharged during her hospitalization they found her PCO2 was 90 89-year-old female [place] in [place] 5 [place] ago she came back from [place] chief complaint respiratory distress brought in by the EMS on a CPAP According to the family patient has a history of asthma hypertension diabetes hypothyroidism and dyslipidemia patient is on amlodipine metformin levothyroxine clonidine albuterol and rosuvastatin As per the patient's son during his stay in [facility] few days prior to arrival she was in respiratory distress her pulse ox was 40% she was transferred to the hospital and was admitted and then later on discharged during her hospitalization they found her PCO2 was 90 89-year-old female [place] in [place] 5 [place] ago she came back from [place] chief complaint respiratory distress brought in by the EMS on a CPAP According to the family patient has a history of asthma hypertension diabetes hypothyroidism and dyslipidemia patient is on amlodipine metformin levothyroxine clonidine albuterol and rosuvastatin As per the patient's son during his stay in [facility] few days prior to arrival she was in respiratory distress her pulse ox was 40% she was transferred to the hospital and was admitted and then later on discharged during her hospitalization they found her PCO2 was 90 89-year-old female [place] in [place] 5 [place] ago she came back from [place] chief complaint respiratory distress brought in by the EMS on a CPAP According to the family patient has a history of asthma hypertension diabetes hypothyroidism and dyslipidemia , CRI patient is on amlodipine 5 mg  metformin 500 mg bid levothyroxine 25 mcg qd  clonidine 0.2 mg bid  albuterol and rosuvastatin 5 mg glimaperide 4 mg qd   asa 81 mg qd  As per the patient's son during his stay in [place] few days prior to arrival she was in respiratory distress her pulse ox was 40% she was transferred to the hospital and was admitted and then later on discharged during her hospitalization they found her PCO2 was 90

## 2023-04-07 NOTE — ED ADULT NURSE NOTE - OBJECTIVE STATEMENT
shortness of breath, recent arrived from [place], was hospitalized there for 9 days for some same symptoms. Skin cool dry. Generalized edema and right arm swelling with redness at the sight of the wrist.

## 2023-04-07 NOTE — H&P ADULT - NSHPSOCIALHISTORY_GEN_ALL_CORE
Unable to obtain social history from patient due to lethargy    Unable to obtain family history from patient due to lethargy

## 2023-04-07 NOTE — H&P ADULT - HISTORY OF PRESENT ILLNESS
Unable to obtain history from patient due to lethargy. All history obtained from patient's son    Sunita  phone used, ID # [id number]    Despite use of phone, patient has poor hearing and is tired - she is not able to communicate with  phone to provide history. All hx obtain from chart review and son, Martin Santana,     89F (Gujarati-speaking) with HTN, asthma, DM2, hypothyroidism, recent hospitalization in [place] for "trouble breathing", returned from [facility] 5 days ago, comes to the ED with SOB, diagnosed with asthma exacerbation secondary to RSV. When patient hospitalized in [place], no official diagnosis was provided to family.  Unable to obtain history from patient due to lethargy. All history obtained from patient's son    Sunita  phone used, ID # [id number]    Despite use of phone, patient has poor hearing and is tired - she is not able to communicate with  phone to provide history. All hx obtain from chart review and son, Martin Santana,     89F (Gujarati-speaking) with HTN, asthma, DM2, hypothyroidism, recent hospitalization in [place] for "trouble breathing", returned from [facility] 5 days ago, comes to the ED with SOB, diagnosed with asthma exacerbation secondary to RSV. When patient hospitalized in [place], no official diagnosis was provided to family.  Unable to obtain history from patient due to lethargy. All history obtained from patient's son    Sunita  phone used, ID # [id number]    Despite use of phone, patient has poor hearing and is tired - she is not able to communicate with  phone to provide history. All hx obtain from chart review and son, Martin Santana,     89F (Gujarati-speaking) with HTN, asthma, DM2, hypothyroidism, recent hospitalization in [place] for "trouble breathing", returned from [facility] 5 days ago, comes to the ED with SOB, diagnosed with asthma exacerbation secondary to RSV. When patient hospitalized in [place], no official diagnosis was provided to family.

## 2023-04-07 NOTE — H&P ADULT - NSHPPHYSICALEXAM_GEN_ALL_CORE
Vital Signs Last 24 Hrs  T(F): 97.6 (07 Apr 2023 15:55), Max: 97.6 (07 Apr 2023 15:55)  HR: 80 (07 Apr 2023 18:25) (80 - 108)  BP: 139/88 (07 Apr 2023 18:25) (131/75 - 160/102)  RR: 21 (07 Apr 2023 18:25) (16 - 24)  SpO2: 100% (07 Apr 2023 18:25) (98% - 100%)    PHYSICAL EXAM:  GENERAL: NAD, falls asleep throughout exam  HEAD:  Atraumatic, Normocephalic  EYES: EOMI, conjunctiva and sclera clear  ENMT: +[place], Moist mucous membranes, fair dentition, no thrush  NECK: Supple, No JVD  CHEST/LUNG: Clear to auscultation bilaterally limited to poor effor, good air entry  HEART: RRR with occasional irregular beats; S1/S2  ABDOMEN: Soft, Nontender, Nondistended; Bowel sounds present  VASCULAR: Normal pulses, Normal capillary refill  EXTREMITIES: No calf tenderness, No cyanosis, 2+ pitting edema  LYMPH: Normal; No lymphadenopathy noted  SKIN: Warm, Intact, No rashes noted  PSYCH: lethargic, poor concentration  NERVOUS SYSTEM: CN 2-12 grossly intact, No focal deficits limited to poor effort Vital Signs Last 24 Hrs  T(F): 97.6 (07 Apr 2023 15:55), Max: 97.6 (07 Apr 2023 15:55)  HR: 80 (07 Apr 2023 18:25) (80 - 108)  BP: 139/88 (07 Apr 2023 18:25) (131/75 - 160/102)  RR: 21 (07 Apr 2023 18:25) (16 - 24)  SpO2: 100% (07 Apr 2023 18:25) (98% - 100%)    PHYSICAL EXAM:  GENERAL: NAD, falls asleep throughout exam  HEAD:  Atraumatic, Normocephalic  EYES: EOMI, conjunctiva and sclera clear  ENMT: +[place], Moist mucous membranes, fair dentition, no thrush  NECK: Supple, No JVD  CHEST/LUNG: Clear to auscultation bilaterally limited to poor effor, good air entry  HEART: RRR with occasional irregular beats; S1/S2  ABDOMEN: Soft, Nontender, Nondistended; Bowel sounds present  VASCULAR: Normal pulses, Normal capillary refill  EXTREMITIES: No calf tenderness, No cyanosis, 2+ pitting edema  LYMPH: Normal; No lymphadenopathy noted  SKIN: Warm, Intact, No rashes noted  PSYCH: lethargic, poor concentration  NERVOUS SYSTEM: CN 2-12 grossly intact, No focal deficits limited to poor effort Vital Signs Last 24 Hrs  T(F): 97.6 (07 Apr 2023 15:55), Max: 97.6 (07 Apr 2023 15:55)  HR: 80 (07 Apr 2023 18:25) (80 - 108)  BP: 139/88 (07 Apr 2023 18:25) (131/75 - 160/102)  RR: 21 (07 Apr 2023 18:25) (16 - 24)  SpO2: 100% (07 Apr 2023 18:25) (98% - 100%)    PHYSICAL EXAM:  GENERAL: NAD, falls asleep throughout exam  HEAD:  Atraumatic, Normocephalic  EYES: EOMI, conjunctiva and sclera clear  ENMT: +[place], Moist mucous membranes, fair dentition, no thrush  NECK: Supple, No JVD  CHEST/LUNG: Clear to auscultation bilaterally limited to poor effor, good air entry  HEART: RRR with occasional irregular beats; S1/S2  ABDOMEN: Soft, Nontender, Nondistended; Bowel sounds present  VASCULAR: Normal pulses, Normal capillary refill  EXTREMITIES: No calf tenderness, No cyanosis, 2+ pitting edema  LYMPH: Normal; No lymphadenopathy noted  SKIN: Warm, Intact, No rashes noted  PSYCH: lethargic, poor concentration  NERVOUS SYSTEM: CN 2-12 grossly intact, No focal deficits limited to poor effort Vital Signs Last 24 Hrs  T(F): 97.6 (07 Apr 2023 15:55), Max: 97.6 (07 Apr 2023 15:55)  HR: 80 (07 Apr 2023 18:25) (80 - 108)  BP: 139/88 (07 Apr 2023 18:25) (131/75 - 160/102)  RR: 21 (07 Apr 2023 18:25) (16 - 24)  SpO2: 100% (07 Apr 2023 18:25) (98% - 100%)    PHYSICAL EXAM:  GENERAL: NAD, falls asleep throughout exam  HEAD:  Atraumatic, Normocephalic  EYES: EOMI, conjunctiva and sclera clear  ENMT: +[place], Moist mucous membranes, fair dentition, no thrush  NECK: Supple, No JVD  CHEST/LUNG: Clear to auscultation bilaterally limited to poor effort, good air entry  HEART: IRRR; S1/S2  ABDOMEN: Soft, Nontender, Nondistended; Bowel sounds present  VASCULAR: Normal pulses, Normal capillary refill  EXTREMITIES: No calf tenderness, No cyanosis, 2+ pitting edema  LYMPH: Normal; No lymphadenopathy noted  SKIN: Warm, Intact, No rashes noted  PSYCH: lethargic, poor concentration  NERVOUS SYSTEM: CN 2-12 grossly intact, No focal deficits limited to poor effort Vital Signs Last 24 Hrs  T(F): 97.6 (07 Apr 2023 15:55), Max: 97.6 (07 Apr 2023 15:55)  HR: 80 (07 Apr 2023 18:25) (80 - 108)  BP: 139/88 (07 Apr 2023 18:25) (131/75 - 160/102)  RR: 21 (07 Apr 2023 18:25) (16 - 24)  SpO2: 100% (07 Apr 2023 18:25) (98% - 100%)    PHYSICAL EXAM:  GENERAL: NAD, falls asleep throughout exam  HEAD:  Atraumatic, Normocephalic  EYES: EOMI, conjunctiva and sclera clear  ENMT: +[place], Moist mucous membranes, fair dentition, no thrush  NECK: Supple, No JVD  CHEST/LUNG: Clear to auscultation bilaterally limited to poor effort, good air entry  HEART: IRRR; S1/S2  ABDOMEN: Soft, Nontender, Nondistended; Bowel sounds present  VASCULAR: Normal pulses, Normal capillary refill  EXTREMITIES: No calf tenderness, No cyanosis, 2+ pitting edema  LYMPH: Normal; No lymphadenopathy noted  SKIN: Warm, Intact, No rashes noted  PSYCH: lethargic, poor concentration  NERVOUS SYSTEM: CN 2-12 grossly intact, No focal deficits limited to poor effort Vital Signs Last 24 Hrs  T(F): 97.6 (07 Apr 2023 15:55), Max: 97.6 (07 Apr 2023 15:55)  HR: 80 (07 Apr 2023 18:25) (80 - 108)  BP: 139/88 (07 Apr 2023 18:25) (131/75 - 160/102)  RR: 21 (07 Apr 2023 18:25) (16 - 24)  SpO2: 100% (07 Apr 2023 18:25) (98% - 100%)    PHYSICAL EXAM:  GENERAL: NAD, falls asleep throughout exam  HEAD:  Atraumatic, Normocephalic  EYES: EOMI, conjunctiva and sclera clear  ENMT: +[place], Moist mucous membranes, fair dentition, no thrush  NECK: Supple, No JVD  CHEST/LUNG: Clear to auscultation bilaterally limited to poor effort, good air entry  HEART: IRRR; S1/S2  ABDOMEN: Soft, Nontender, Nondistended; Bowel sounds present  VASCULAR: Normal pulses, Normal capillary refill  EXTREMITIES: No calf tenderness, No cyanosis, 2+ pitting edema  LYMPH: Normal; No lymphadenopathy noted  SKIN: Warm, Intact, No rashes noted  PSYCH: lethargic, poor concentration  NERVOUS SYSTEM: CN 2-12 grossly intact, No focal deficits limited to poor effort

## 2023-04-07 NOTE — ED ADULT NURSE NOTE - PLAN OF CARE
2/12/2020       RE: Lindsay Payne  [address] [place] [address]     Dear Colleague,    Thank you for referring your patient, Lindsay Payne, to the [facility] DERMATOLOGY at [facility]. Please see a copy of my visit note below.    [facility] Dermatology Note      Dermatology Problem List:  1. Family history of melanoma   2. 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 Rosacea: metrogel, rhofade   - s/p PDL   4. Spider angioma, nasal bridge  - s/p PDL 5/9/18, 11/21/18, 1/15/2020, 02/12/20  5. Perioral dermatitis - Metrogel  6. Benign bx:  - History of benign lesion biopsied on back per report  - History of nevus excision left breast in [facility], non-dysplastic per patient  - Nevus pigmentosus, compound, left inferior breast, bx 4/11/14  - Lentigo, left breast, bx 8/7/2015  7. Dermatitis, chin, possibly perioral  -insurance denied elidel so started meto cream    Encounter Date: Feb 12, 2020    CC:  No chief complaint on file.  Dermatitis perioral       History of Present Illness:  Ms. Lindsay Payne is a 37 year old female with dermatitis on chin, could not get elide. Still bothersome, also gets acne in this area. Want PDL, see pdl note      Past Medical History:   Patient Active Problem List   Diagnosis     CARDIOVASCULAR SCREENING; LDL GOAL LESS THAN 160     Paraguard intrauterine device     Gastroesophageal reflux disease, esophagitis presence not specified     Past Medical History:   Diagnosis Date     NO ACTIVE PROBLEMS      Past Surgical History:   Procedure Laterality Date     APPENDECTOMY  1994       Social History:  Patient reports that she has never smoked. She has never used smokeless tobacco. She reports current alcohol use. She reports that she does not use drugs.    Family History:  Family History   Problem Relation Age of Onset     Cancer Mother         malignant  melanoma     Arthritis Father         Bekhterev's disease-spinal suffication     Glaucoma Maternal Grandmother        Medications:  Current Outpatient Medications   Medication Sig Dispense Refill     atovaquone-proguanil (MALARONE) 250-100 MG tablet Take 1 tablet by mouth daily Start 2 days before exposure to Malaria and continue daily till  7 days after exposure. (Patient not taking: Reported on 2/12/2020) 25 tablet 0     cyclobenzaprine (FLEXERIL) 5 MG tablet Take 1 tablet (5 mg) by mouth 3 times daily as needed for muscle spasms (Patient not taking: Reported on 2/12/2020) 15 tablet 0     levonorgestrel (MIRENA) 20 MCG/24HR IUD 1 each by Intrauterine route once       montelukast (SINGULAIR) 10 MG tablet Take 1 tablet (10 mg) by mouth At Bedtime (Patient not taking: Reported on 10/15/2019) 90 tablet 2     omeprazole (PRILOSEC) 20 MG CR capsule Take 1 capsule (20 mg) by mouth daily 90 capsule 1     pimecrolimus (ELIDEL) 1 % external cream Apply topically 2 times daily (Patient not taking: Reported on 2/12/2020) 60 g 4       Allergies   Allergen Reactions     Pnv [Prenatal Vit R-[id number]] Itching and Rash       Review of Systems:     -Constitutional: The patient is feeling generally well. Has been reading.     Physical exam:  GEN: This is a well developed, well-nourished female in no acute distress, in a pleasant mood.    SKIN:   Focused exam of face with telangiectasias on cheeks and nose and patch of erythema on lower chin  - No other lesions of concern on areas examined.     Impression/Plan:  1. History of dysplastic nevi and family history of melanoma.     Next skin exam is due 10/2020    2. Healed biopsy site with pigment recurrence, R mid-back. At site of benign biopsy per patient.   Photodocumentation exists and recheck is due 10/2020    3. Rosacea - cheeks and nose , never got rhofade cream, using PDL with improvement, see procedure note  See procedure note  4. Dermatitis, chin, possibly perioral-  insurance denies rocco    Will do trial of mtro  Follow-up in 1 year for skin exam, earlier for new or changing lesions.       Staff Involved:    Scribe Disclosure  I, Ruben Faye, am serving as a scribe to document services personally performed by Dr. Diann Mac, based on data collection and the provider's statements to me.     Provider Disclosure:   The documentation recorded by the scribe accurately reflects the services I personally performed and the decisions made by me.    Dinorah Burt MD    Department of Dermatology  [facility]: Phone: [phone], Fax:[phone]  [facility]: Phone: [phone], Fax: [phone]   Call bell

## 2023-04-07 NOTE — ED PROVIDER NOTE - CLINICAL SUMMARY MEDICAL DECISION MAKING FREE TEXT BOX
89-year-old female [place] 5 [place] ago she came back from [place] chief complaint respiratory distress brought in by the EMS on a CPAP According to the family patient has a history of asthma hypertension diabetes hypothyroidism and dyslipidemia , CRI patient is on amlodipine 5 mg  metformin 500 mg bid levothyroxine 25 mcg qd  clonidine 0.2 mg bid  albuterol and rosuvastatin 5 mg glimaperide 4 mg qd   asa 81 mg qd  As per the patient's son during his stay in [place] few days prior to arrival she was in respiratory distress her pulse ox was 40% she was transferred to the hospital and was admitted and then later on discharged during her hospitalization they found her PCO2 was 90 89-year-old female [place] 5 [place] ago she came back from [place] chief complaint respiratory distress brought in by the EMS on a CPAP According to the family patient has a history of asthma hypertension diabetes hypothyroidism and dyslipidemia , CRI patient is on amlodipine 5 mg  metformin 500 mg bid levothyroxine 25 mcg qd  clonidine 0.2 mg bid  albuterol and rosuvastatin 5 mg glimaperide 4 mg qd   asa 81 mg qd  As per the patient's son during his stay in [place] few days prior to arrival she was in respiratory distress her pulse ox was 40% she was transferred to the hospital and was admitted and then later on discharged during her hospitalization they found her PCO2 was 90  Also after the insulin okay no problem patient was found to have RSV positive chest x-ray is clear proBNP 8800 CBC hemoglobin 8.7 creatinine 1.6 which is at her baseline patient was treated with the Decadron 10 mg IV piggyback DuoNebs and 20 mg of Lasix which significantly improved the wheezing patient was taken off BiPAP and on 2 L nasal cannula Case was discussed with PMD Dr. Merchant [phone] 89-year-old female [place] in [place] 5 [place] ago she came back from [place] chief complaint respiratory distress brought in by the EMS on a CPAP According to the family patient has a history of asthma hypertension diabetes hypothyroidism and dyslipidemia , CRI patient is on amlodipine 5 mg  metformin 500 mg bid levothyroxine 25 mcg qd  clonidine 0.2 mg bid  albuterol and rosuvastatin 5 mg glimaperide 4 mg qd   asa 81 mg qd  As per the patient's son during his stay in [place] few days prior to arrival she was in respiratory distress her pulse ox was 40% she was transferred to the hospital and was admitted and then later on discharged during her hospitalization they found her PCO2 was 90  Also after the insulin okay no problem patient was found to have RSV positive chest x-ray is clear proBNP 8800 CBC hemoglobin 8.7 creatinine 1.6 which is at her baseline patient was treated with the Decadron 10 mg IV piggyback DuoNebs and 20 mg of Lasix which significantly improved the wheezing patient was taken off BiPAP and on 2 L nasal cannula Case was discussed with PMD Dr. Merchant [phone] 89-year-old female [place] 5 [place] ago she came back from [place] chief complaint respiratory distress brought in by the EMS on a CPAP According to the family patient has a history of asthma hypertension diabetes hypothyroidism and dyslipidemia , CRI patient is on amlodipine 5 mg  metformin 500 mg bid levothyroxine 25 mcg qd  clonidine 0.2 mg bid  albuterol and rosuvastatin 5 mg glimaperide 4 mg qd   asa 81 mg qd  As per the patient's son during his stay in [place] few days prior to arrival she was in respiratory distress her pulse ox was 40% she was transferred to the hospital and was admitted and then later on discharged during her hospitalization they found her PCO2 was 90  Also after the insulin okay no problem patient was found to have RSV positive chest x-ray is clear proBNP 8800 CBC hemoglobin 8.7 creatinine 1.6 which is at her baseline patient was treated with the Decadron 10 mg IV piggyback DuoNebs and 20 mg of Lasix which significantly improved the wheezing patient was taken off BiPAP and on 2 L nasal cannula Case was discussed with PMD Dr. Merchant [phone]

## 2023-04-07 NOTE — H&P ADULT - NSHPLABSRESULTS_GEN_ALL_CORE
.                            8.4    12.99 )-----------( 358      ( 07 Apr 2023 15:30 )             27.5     Lactate, Blood: 0.9 mmol/L (04-07 @ 15:30)    04-07    130  |  94  |  32  ----------------------------<  108  5.5   |  32  |  1.66    Ca    8.6      07 Apr 2023 15:30    TPro  6.5  /  Alb  2.8  /  TBili  0.2  /  DBili  x   /  AST  9   /  ALT  7   /  AlkPhos  76  04-07    PT/INR - ( 07 Apr 2023 15:30 )   PT: 11.0 sec;   INR: 0.95 ratio         PTT - ( 07 Apr 2023 15:30 )  PTT:31.7 sec    ABG - ( 07 Apr 2023 15:59 )  pH, Arterial: 7.40  pH, Blood: x     /  pCO2: 45    /  pO2: 196   / HCO3: 28    / Base Excess: 3.1   /  SaO2: 99.7      EKG:     < from: Xray Chest 1 View-PORTABLE IMMEDIATE (04.07.23 @ 16:09) >    COMPARISON: None available.    Heart magnified by technique.    Lungs are clear.    < end of copied text >

## 2023-04-07 NOTE — ED ADULT TRIAGE NOTE - CHIEF COMPLAINT QUOTE
Pt BIB home in respiratory distress.  EMS reports pt was "struggling to breathe on arrival",.  Pt rec'd on CPAP mask, tachypneic.  Pt family states pt "has not been feeling well since she came back from [place]".  Poor appetite, poor po intake for days.  Pt had cough, history of asthma and DM.

## 2023-04-08 LAB
ANION GAP SERPL CALC-SCNC: 7 MMOL/L — SIGNIFICANT CHANGE UP (ref 5–17)
BUN SERPL-MCNC: 37 MG/DL — HIGH (ref 7–23)
BUN SERPL-MCNC: 42 MG/DL — HIGH (ref 7–23)
CALCIUM SERPL-MCNC: 8.8 MG/DL — SIGNIFICANT CHANGE UP (ref 8.4–10.5)
CHLORIDE SERPL-SCNC: 93 MMOL/L — LOW (ref 96–108)
CHLORIDE SERPL-SCNC: 94 MMOL/L — LOW (ref 96–108)
CO2 SERPL-SCNC: 28 MMOL/L — SIGNIFICANT CHANGE UP (ref 22–31)
CO2 SERPL-SCNC: 30 MMOL/L — SIGNIFICANT CHANGE UP (ref 22–31)
CREAT SERPL-MCNC: 1.93 MG/DL — HIGH (ref 0.5–1.3)
CREAT SERPL-MCNC: 2.03 MG/DL — HIGH (ref 0.5–1.3)
EGFR: 23 ML/MIN/1.73M2 — LOW
EGFR: 24 ML/MIN/1.73M2 — LOW
GLUCOSE BLDC GLUCOMTR-MCNC: 116 MG/DL — HIGH (ref 70–99)
GLUCOSE BLDC GLUCOMTR-MCNC: 127 MG/DL — HIGH (ref 70–99)
GLUCOSE BLDC GLUCOMTR-MCNC: 253 MG/DL — HIGH (ref 70–99)
GLUCOSE BLDC GLUCOMTR-MCNC: 256 MG/DL — HIGH (ref 70–99)
GLUCOSE BLDC GLUCOMTR-MCNC: 282 MG/DL — HIGH (ref 70–99)
GLUCOSE SERPL-MCNC: 131 MG/DL — HIGH (ref 70–99)
GLUCOSE SERPL-MCNC: 284 MG/DL — HIGH (ref 70–99)
HCT VFR BLD CALC: 25.3 % — LOW (ref 34.5–45)
HGB BLD-MCNC: 8.1 G/DL — LOW (ref 11.5–15.5)
MAGNESIUM SERPL-MCNC: 2.4 MG/DL — SIGNIFICANT CHANGE UP (ref 1.6–2.6)
MCHC RBC-ENTMCNC: 20.4 PG — LOW (ref 27–34)
MCHC RBC-ENTMCNC: 32 GM/DL — SIGNIFICANT CHANGE UP (ref 32–36)
MCV RBC AUTO: 63.6 FL — LOW (ref 80–100)
NRBC # BLD: 0 /100 WBCS — SIGNIFICANT CHANGE UP (ref 0–0)
PHOSPHATE SERPL-MCNC: 4.5 MG/DL — SIGNIFICANT CHANGE UP (ref 2.5–4.5)
PLATELET # BLD AUTO: 314 K/UL — SIGNIFICANT CHANGE UP (ref 150–400)
POTASSIUM SERPL-MCNC: 5.5 MMOL/L — HIGH (ref 3.5–5.3)
POTASSIUM SERPL-MCNC: 6.1 MMOL/L — HIGH (ref 3.5–5.3)
POTASSIUM SERPL-SCNC: 5.5 MMOL/L — HIGH (ref 3.5–5.3)
POTASSIUM SERPL-SCNC: 6.1 MMOL/L — HIGH (ref 3.5–5.3)
RBC # BLD: 3.98 M/UL — SIGNIFICANT CHANGE UP (ref 3.8–5.2)
RBC # FLD: 17.2 % — HIGH (ref 10.3–14.5)
SODIUM SERPL-SCNC: 128 MMOL/L — LOW (ref 135–145)
SODIUM SERPL-SCNC: 131 MMOL/L — LOW (ref 135–145)
TSH SERPL-MCNC: 1.38 UIU/ML — SIGNIFICANT CHANGE UP (ref 0.36–3.74)
WBC # BLD: 7.77 K/UL — SIGNIFICANT CHANGE UP (ref 3.8–10.5)
WBC # FLD AUTO: 7.77 K/UL — SIGNIFICANT CHANGE UP (ref 3.8–10.5)

## 2023-04-08 PROCEDURE — 93306 TTE W/DOPPLER COMPLETE: CPT | Mod: 26

## 2023-04-08 PROCEDURE — 99223 1ST HOSP IP/OBS HIGH 75: CPT

## 2023-04-08 PROCEDURE — 93970 EXTREMITY STUDY: CPT | Mod: 26

## 2023-04-08 PROCEDURE — 71250 CT THORAX DX C-: CPT | Mod: 26

## 2023-04-08 PROCEDURE — 99232 SBSQ HOSP IP/OBS MODERATE 35: CPT

## 2023-04-08 RX ORDER — SODIUM POLYSTYRENE SULFONATE 4.1 MEQ/G
15 POWDER, FOR SUSPENSION ORAL ONCE
Refills: 0 | Status: COMPLETED | OUTPATIENT
Start: 2023-04-08 | End: 2023-04-08

## 2023-04-08 RX ORDER — SODIUM CHLORIDE 9 MG/ML
1000 INJECTION INTRAMUSCULAR; INTRAVENOUS; SUBCUTANEOUS
Refills: 0 | Status: DISCONTINUED | OUTPATIENT
Start: 2023-04-08 | End: 2023-04-10

## 2023-04-08 RX ORDER — FERROUS SULFATE 325(65) MG
325 TABLET ORAL DAILY
Refills: 0 | Status: DISCONTINUED | OUTPATIENT
Start: 2023-04-08 | End: 2023-04-15

## 2023-04-08 RX ORDER — SODIUM POLYSTYRENE SULFONATE 4.1 MEQ/G
15 POWDER, FOR SUSPENSION ORAL ONCE
Refills: 0 | Status: DISCONTINUED | OUTPATIENT
Start: 2023-04-08 | End: 2023-04-08

## 2023-04-08 RX ADMIN — Medication 6: at 08:24

## 2023-04-08 RX ADMIN — ALBUTEROL 2.5 MILLIGRAM(S): 90 AEROSOL, METERED ORAL at 09:01

## 2023-04-08 RX ADMIN — ATORVASTATIN CALCIUM 20 MILLIGRAM(S): 80 TABLET, FILM COATED ORAL at 21:19

## 2023-04-08 RX ADMIN — Medication 40 MILLIGRAM(S): at 06:09

## 2023-04-08 RX ADMIN — APIXABAN 2.5 MILLIGRAM(S): 2.5 TABLET, FILM COATED ORAL at 17:20

## 2023-04-08 RX ADMIN — Medication 0.2 MILLIGRAM(S): at 06:08

## 2023-04-08 RX ADMIN — Medication 0.2 MILLIGRAM(S): at 17:18

## 2023-04-08 RX ADMIN — APIXABAN 2.5 MILLIGRAM(S): 2.5 TABLET, FILM COATED ORAL at 06:08

## 2023-04-08 RX ADMIN — SODIUM POLYSTYRENE SULFONATE 15 GRAM(S): 4.1 POWDER, FOR SUSPENSION ORAL at 17:17

## 2023-04-08 RX ADMIN — ALBUTEROL 2.5 MILLIGRAM(S): 90 AEROSOL, METERED ORAL at 15:15

## 2023-04-08 RX ADMIN — SODIUM POLYSTYRENE SULFONATE 15 GRAM(S): 4.1 POWDER, FOR SUSPENSION ORAL at 21:17

## 2023-04-08 RX ADMIN — Medication 25 MICROGRAM(S): at 06:08

## 2023-04-08 RX ADMIN — Medication 2: at 21:20

## 2023-04-08 RX ADMIN — SODIUM CHLORIDE 100 MILLILITER(S): 9 INJECTION INTRAMUSCULAR; INTRAVENOUS; SUBCUTANEOUS at 17:17

## 2023-04-08 RX ADMIN — ALBUTEROL 2.5 MILLIGRAM(S): 90 AEROSOL, METERED ORAL at 21:54

## 2023-04-08 RX ADMIN — AMLODIPINE BESYLATE 5 MILLIGRAM(S): 2.5 TABLET ORAL at 06:08

## 2023-04-08 RX ADMIN — ALBUTEROL 2.5 MILLIGRAM(S): 90 AEROSOL, METERED ORAL at 06:14

## 2023-04-08 RX ADMIN — Medication 6: at 12:22

## 2023-04-08 NOTE — PATIENT PROFILE ADULT - FALL HARM RISK - HARM RISK INTERVENTIONS
Assistance with ambulation/Assistance OOB with selected safe patient handling equipment/Communicate Risk of Fall with Harm to all staff/Reinforce activity limits and safety measures with patient and family/Tailored Fall Risk Interventions/Visual Cue: Yellow wristband and red socks/Bed in lowest position, wheels locked, appropriate side rails in place/Call bell, personal items and telephone in reach/Instruct patient to call for assistance before getting out of bed or chair/Non-slip footwear when patient is out of bed/[place] to call system/Physically safe environment - no spills, clutter or unnecessary equipment/Purposeful Proactive Rounding/Room/bathroom lighting operational, light cord in reach Assistance with ambulation/Assistance OOB with selected safe patient handling equipment/Communicate Risk of Fall with Harm to all staff/Reinforce activity limits and safety measures with patient and family/Tailored Fall Risk Interventions/Visual Cue: Yellow wristband and red socks/Bed in lowest position, wheels locked, appropriate side rails in place/Call bell, personal items and telephone in reach/Instruct patient to call for assistance before getting out of bed or chair/Non-slip footwear when patient is out of bed/[place] to call system/Physically safe environment - no spills, clutter or unnecessary equipment/Purposeful Proactive Rounding/Room/bathroom lighting operational, light cord in reach Assistance with ambulation/Assistance OOB with selected safe patient handling equipment/Communicate Risk of Fall with Harm to all staff/Reinforce activity limits and safety measures with patient and family/Tailored Fall Risk Interventions/Visual Cue: Yellow wristband and red socks/Bed in lowest position, wheels locked, appropriate side rails in place/Call bell, personal items and telephone in reach/Instruct patient to call for assistance before getting out of bed or chair/Non-slip footwear when patient is out of bed/[place] to call system/Physically safe environment - no spills, clutter or unnecessary equipment/Purposeful Proactive Rounding/Room/bathroom lighting operational, light cord in reach

## 2023-04-08 NOTE — PATIENT PROFILE ADULT - LANGUAGE ASSISTANCE NEEDED
Unable to obtain information due to patient's mental status. [place] dialect not available with current  services/Yes-Patient/Caregiver accepts free interpretation services...

## 2023-04-08 NOTE — PATIENT PROFILE ADULT - VISION (WITH CORRECTIVE LENSES IF THE PATIENT USUALLY WEARS THEM):
Unable to obtain information due to patient's mental status. Unable to obtain information due to patient's mental status./Normal vision: sees adequately in most situations; can see medication labels, newsprint

## 2023-04-08 NOTE — PROGRESS NOTE ADULT - SUBJECTIVE AND OBJECTIVE BOX
Patient is a 89y old Female who presents with a chief complaint of Shortness of Breath (2023 10:06)      Patient seen and examined at bedside. No overnight events reported.     ALLERGIES:  No Known Allergies    MEDICATIONS  (STANDING):  albuterol    0.083% 2.5 milliGRAM(s) Nebulizer every 6 hours  albuterol    90 MICROgram(s) HFA Inhaler 1 Puff(s) Inhalation every 4 hours  amLODIPine   Tablet 5 milliGRAM(s) Oral daily  apixaban 2.5 milliGRAM(s) Oral every 12 hours  atorvastatin 20 milliGRAM(s) Oral at bedtime  cloNIDine 0.2 milliGRAM(s) Oral two times a day  dextrose 5%. 1000 milliLiter(s) (50 mL/Hr) IV Continuous <Continuous>  dextrose 5%. 1000 milliLiter(s) (100 mL/Hr) IV Continuous <Continuous>  dextrose 50% Injectable 25 Gram(s) IV Push once  dextrose 50% Injectable 12.5 Gram(s) IV Push once  dextrose 50% Injectable 25 Gram(s) IV Push once  glucagon  Injectable 1 milliGRAM(s) IntraMuscular once  insulin lispro (ADMELOG) corrective regimen sliding scale   SubCutaneous three times a day before meals  insulin lispro (ADMELOG) corrective regimen sliding scale   SubCutaneous at bedtime  levothyroxine 25 MICROGram(s) Oral daily  predniSONE   Tablet 40 milliGRAM(s) Oral daily    MEDICATIONS  (PRN):  acetaminophen     Tablet .. 650 milliGRAM(s) Oral every 6 hours PRN Temp greater or equal to 38C (100.4F), Mild Pain (1 - 3)  aluminum hydroxide/magnesium hydroxide/simethicone Suspension 30 milliLiter(s) Oral every 4 hours PRN Dyspepsia  dextrose Oral Gel 15 Gram(s) Oral once PRN Blood Glucose LESS THAN 70 milliGRAM(s)/deciliter  melatonin 3 milliGRAM(s) Oral at bedtime PRN Insomnia  ondansetron Injectable 4 milliGRAM(s) IV Push every 8 hours PRN Nausea and/or Vomiting    Vital Signs Last 24 Hrs  T(F): 97.3 (2023 06:06), Max: 97.9 (2023 21:26)  HR: 109 (2023 09:02) (74 - 109)  BP: 179/95 (2023 06:06) (131/75 - 179/95)  RR: 19 (2023 06:06) (16 - 24)  SpO2: 97% (2023 09:02) (93% - 100%)  I&O's Summary    PHYSICAL EXAM:  General: NAD, Awake  ENT: No gross hearing impairment, Moist mucous membranes, no thrush  Neck: Supple, No JVD  Lungs: Wheezing to auscultation bilaterally, good air entry, non-labored breathing  Cardio: RRR, S1/S2, + murmur  Abdomen: Soft, Nontender, Nondistended; Bowel sounds present  Extremities: No calf tenderness, No cyanosis, No pitting edema    LABS:                        8.1    7.77  )-----------( 314      ( 2023 08:31 )             25.3     04-08    128  |  93  |  37  ----------------------------<  284  6.1   |  28  |  1.93    Ca    8.8      2023 08:31  Phos  4.5     04-08  Mg     2.4     04-08    TPro  6.5  /  Alb  2.8  /  TBili  0.2  /  DBili  x   /  AST  9   /  ALT  7   /  AlkPhos  76  04-07          PT/INR - ( 2023 15:30 )   PT: 11.0 sec;   INR: 0.95 ratio         PTT - ( 2023 15:30 )  PTT:31.7 sec  Lactate, Blood: 0.9 mmol/L (-07 @ 15:30)      CARDIAC MARKERS ( 2023 20:10 )  x     / 18.9 ng/L / x     / x     / x            TSH 1.383   TSH with FT4 reflex --  Total T3 --      ABG - ( 2023 15:59 )  pH, Arterial: 7.40  pH, Blood: x     /  pCO2: 45    /  pO2: 196   / HCO3: 28    / Base Excess: 3.1   /  SaO2: 99.7                    POCT Blood Glucose.: 253 mg/dL (2023 12:20)  POCT Blood Glucose.: 116 mg/dL (2023 12:17)  POCT Blood Glucose.: 282 mg/dL (2023 08:17)  POCT Blood Glucose.: 142 mg/dL (2023 22:02)      Urinalysis Basic - ( 2023 18:55 )    Color: Yellow / Appearance: Clear / S.010 / pH: x  Gluc: x / Ketone: Negative  / Bili: Negative / Urobili: Negative   Blood: x / Protein: 30 mg/dL / Nitrite: Negative   Leuk Esterase: Moderate / RBC: 11-25 /HPF / WBC 11-25 /HPF   Sq Epi: x / Non Sq Epi: x / Bacteria: Moderate /HPF          RADIOLOGY & ADDITIONAL TESTS: < from: Xray Chest 1 View-PORTABLE IMMEDIATE (23 @ 16:09) >  IMPRESSION: No acute finding.    < end of copied text >    Care Discussed with Consultants/Other Providers:    Patient is a 89y old Female who presents with a chief complaint of Shortness of Breath (2023 10:06)      Patient seen and examined at bedside. No overnight events reported.     ALLERGIES:  No Known Allergies    MEDICATIONS  (STANDING):  albuterol    0.083% 2.5 milliGRAM(s) Nebulizer every 6 hours  albuterol    90 MICROgram(s) HFA Inhaler 1 Puff(s) Inhalation every 4 hours  amLODIPine   Tablet 5 milliGRAM(s) Oral daily  apixaban 2.5 milliGRAM(s) Oral every 12 hours  atorvastatin 20 milliGRAM(s) Oral at bedtime  cloNIDine 0.2 milliGRAM(s) Oral two times a day  dextrose 5%. 1000 milliLiter(s) (50 mL/Hr) IV Continuous <Continuous>  dextrose 5%. 1000 milliLiter(s) (100 mL/Hr) IV Continuous <Continuous>  dextrose 50% Injectable 25 Gram(s) IV Push once  dextrose 50% Injectable 12.5 Gram(s) IV Push once  dextrose 50% Injectable 25 Gram(s) IV Push once  glucagon  Injectable 1 milliGRAM(s) IntraMuscular once  insulin lispro (ADMELOG) corrective regimen sliding scale   SubCutaneous three times a day before meals  insulin lispro (ADMELOG) corrective regimen sliding scale   SubCutaneous at bedtime  levothyroxine 25 MICROGram(s) Oral daily  predniSONE   Tablet 40 milliGRAM(s) Oral daily    MEDICATIONS  (PRN):  acetaminophen     Tablet .. 650 milliGRAM(s) Oral every 6 hours PRN Temp greater or equal to 38C (100.4F), Mild Pain (1 - 3)  aluminum hydroxide/magnesium hydroxide/simethicone Suspension 30 milliLiter(s) Oral every 4 hours PRN Dyspepsia  dextrose Oral Gel 15 Gram(s) Oral once PRN Blood Glucose LESS THAN 70 milliGRAM(s)/deciliter  melatonin 3 milliGRAM(s) Oral at bedtime PRN Insomnia  ondansetron Injectable 4 milliGRAM(s) IV Push every 8 hours PRN Nausea and/or Vomiting    Vital Signs Last 24 Hrs  T(F): 97.3 (2023 06:06), Max: 97.9 (2023 21:26)  HR: 109 (2023 09:02) (74 - 109)  BP: 179/95 (2023 06:06) (131/75 - 179/95)  RR: 19 (2023 06:06) (16 - 24)  SpO2: 97% (2023 09:02) (93% - 100%)  I&O's Summary    PHYSICAL EXAM:  General: NAD, Awake, [place]  ENT: gross hearing impairment, Moist mucous membranes, no thrush  Neck: Supple, No JVD  Lungs: Wheezing to auscultation bilaterally, good air entry, non-labored breathing  Cardio: RRR, S1/S2, + murmur  Abdomen: Soft, Nontender, Nondistended; Bowel sounds present  Extremities: No calf tenderness, No cyanosis, No pitting edema    LABS:                        8.1    7.77  )-----------( 314      ( 2023 08:31 )             25.3     04-08    128  |  93  |  37  ----------------------------<  284  6.1   |  28  |  1.93    Ca    8.8      2023 08:31  Phos  4.5     04-08  Mg     2.4     04-08    TPro  6.5  /  Alb  2.8  /  TBili  0.2  /  DBili  x   /  AST  9   /  ALT  7   /  AlkPhos  76  04-07          PT/INR - ( 2023 15:30 )   PT: 11.0 sec;   INR: 0.95 ratio         PTT - ( 2023 15:30 )  PTT:31.7 sec  Lactate, Blood: 0.9 mmol/L (04- @ 15:30)      CARDIAC MARKERS ( 2023 20:10 )  x     / 18.9 ng/L / x     / x     / x            TSH 1.383   TSH with FT4 reflex --  Total T3 --      ABG - ( 2023 15:59 )  pH, Arterial: 7.40  pH, Blood: x     /  pCO2: 45    /  pO2: 196   / HCO3: 28    / Base Excess: 3.1   /  SaO2: 99.7                    POCT Blood Glucose.: 253 mg/dL (2023 12:20)  POCT Blood Glucose.: 116 mg/dL (2023 12:17)  POCT Blood Glucose.: 282 mg/dL (2023 08:17)  POCT Blood Glucose.: 142 mg/dL (2023 22:02)      Urinalysis Basic - ( 2023 18:55 )    Color: Yellow / Appearance: Clear / S.010 / pH: x  Gluc: x / Ketone: Negative  / Bili: Negative / Urobili: Negative   Blood: x / Protein: 30 mg/dL / Nitrite: Negative   Leuk Esterase: Moderate / RBC: 11-25 /HPF / WBC 11-25 /HPF   Sq Epi: x / Non Sq Epi: x / Bacteria: Moderate /HPF          RADIOLOGY & ADDITIONAL TESTS: < from: Xray Chest 1 View-PORTABLE IMMEDIATE (23 @ 16:09) >  IMPRESSION: No acute finding.    < end of copied text >    Care Discussed with Consultants/Other Providers:    Patient is a 89y old Female who presents with a chief complaint of Shortness of Breath (2023 10:06)      Patient seen and examined at bedside. No overnight events reported.     ALLERGIES:  No Known Allergies    MEDICATIONS  (STANDING):  albuterol    0.083% 2.5 milliGRAM(s) Nebulizer every 6 hours  albuterol    90 MICROgram(s) HFA Inhaler 1 Puff(s) Inhalation every 4 hours  amLODIPine   Tablet 5 milliGRAM(s) Oral daily  apixaban 2.5 milliGRAM(s) Oral every 12 hours  atorvastatin 20 milliGRAM(s) Oral at bedtime  cloNIDine 0.2 milliGRAM(s) Oral two times a day  dextrose 5%. 1000 milliLiter(s) (50 mL/Hr) IV Continuous <Continuous>  dextrose 5%. 1000 milliLiter(s) (100 mL/Hr) IV Continuous <Continuous>  dextrose 50% Injectable 25 Gram(s) IV Push once  dextrose 50% Injectable 12.5 Gram(s) IV Push once  dextrose 50% Injectable 25 Gram(s) IV Push once  glucagon  Injectable 1 milliGRAM(s) IntraMuscular once  insulin lispro (ADMELOG) corrective regimen sliding scale   SubCutaneous three times a day before meals  insulin lispro (ADMELOG) corrective regimen sliding scale   SubCutaneous at bedtime  levothyroxine 25 MICROGram(s) Oral daily  predniSONE   Tablet 40 milliGRAM(s) Oral daily    MEDICATIONS  (PRN):  acetaminophen     Tablet .. 650 milliGRAM(s) Oral every 6 hours PRN Temp greater or equal to 38C (100.4F), Mild Pain (1 - 3)  aluminum hydroxide/magnesium hydroxide/simethicone Suspension 30 milliLiter(s) Oral every 4 hours PRN Dyspepsia  dextrose Oral Gel 15 Gram(s) Oral once PRN Blood Glucose LESS THAN 70 milliGRAM(s)/deciliter  melatonin 3 milliGRAM(s) Oral at bedtime PRN Insomnia  ondansetron Injectable 4 milliGRAM(s) IV Push every 8 hours PRN Nausea and/or Vomiting    Vital Signs Last 24 Hrs  T(F): 97.3 (2023 06:06), Max: 97.9 (2023 21:26)  HR: 109 (2023 09:02) (74 - 109)  BP: 179/95 (2023 06:06) (131/75 - 179/95)  RR: 19 (2023 06:06) (16 - 24)  SpO2: 97% (2023 09:02) (93% - 100%)  I&O's Summary    PHYSICAL EXAM:  General: NAD, Awake, [place]  ENT: gross hearing impairment, Moist mucous membranes, no thrush  Neck: Supple, No JVD  Lungs: Wheezing to auscultation bilaterally, good air entry, non-labored breathing  Cardio: RRR, S1/S2, + murmur  Abdomen: Soft, Nontender, Nondistended; Bowel sounds present  Extremities: No calf tenderness, No cyanosis, No pitting edema    LABS:                        8.1    7.77  )-----------( 314      ( 2023 08:31 )             25.3     04-08    128  |  93  |  37  ----------------------------<  284  6.1   |  28  |  1.93    Ca    8.8      2023 08:31  Phos  4.5     04-08  Mg     2.4     04-08    TPro  6.5  /  Alb  2.8  /  TBili  0.2  /  DBili  x   /  AST  9   /  ALT  7   /  AlkPhos  76  04-07          PT/INR - ( 2023 15:30 )   PT: 11.0 sec;   INR: 0.95 ratio         PTT - ( 2023 15:30 )  PTT:31.7 sec  Lactate, Blood: 0.9 mmol/L (04- @ 15:30)      CARDIAC MARKERS ( 2023 20:10 )  x     / 18.9 ng/L / x     / x     / x            TSH 1.383   TSH with FT4 reflex --  Total T3 --      ABG - ( 2023 15:59 )  pH, Arterial: 7.40  pH, Blood: x     /  pCO2: 45    /  pO2: 196   / HCO3: 28    / Base Excess: 3.1   /  SaO2: 99.7                    POCT Blood Glucose.: 253 mg/dL (2023 12:20)  POCT Blood Glucose.: 116 mg/dL (2023 12:17)  POCT Blood Glucose.: 282 mg/dL (2023 08:17)  POCT Blood Glucose.: 142 mg/dL (2023 22:02)      Urinalysis Basic - ( 2023 18:55 )    Color: Yellow / Appearance: Clear / S.010 / pH: x  Gluc: x / Ketone: Negative  / Bili: Negative / Urobili: Negative   Blood: x / Protein: 30 mg/dL / Nitrite: Negative   Leuk Esterase: Moderate / RBC: 11-25 /HPF / WBC 11-25 /HPF   Sq Epi: x / Non Sq Epi: x / Bacteria: Moderate /HPF          RADIOLOGY & ADDITIONAL TESTS: < from: Xray Chest 1 View-PORTABLE IMMEDIATE (23 @ 16:09) >  IMPRESSION: No acute finding.    < end of copied text >    Care Discussed with Consultants/Other Providers:    Patient is a 89y old Female who presents with a chief complaint of Shortness of Breath (2023 10:06)      Patient seen and examined at bedside. No overnight events reported.     ALLERGIES:  No Known Allergies    MEDICATIONS  (STANDING):  albuterol    0.083% 2.5 milliGRAM(s) Nebulizer every 6 hours  albuterol    90 MICROgram(s) HFA Inhaler 1 Puff(s) Inhalation every 4 hours  amLODIPine   Tablet 5 milliGRAM(s) Oral daily  apixaban 2.5 milliGRAM(s) Oral every 12 hours  atorvastatin 20 milliGRAM(s) Oral at bedtime  cloNIDine 0.2 milliGRAM(s) Oral two times a day  dextrose 5%. 1000 milliLiter(s) (50 mL/Hr) IV Continuous <Continuous>  dextrose 5%. 1000 milliLiter(s) (100 mL/Hr) IV Continuous <Continuous>  dextrose 50% Injectable 25 Gram(s) IV Push once  dextrose 50% Injectable 12.5 Gram(s) IV Push once  dextrose 50% Injectable 25 Gram(s) IV Push once  glucagon  Injectable 1 milliGRAM(s) IntraMuscular once  insulin lispro (ADMELOG) corrective regimen sliding scale   SubCutaneous three times a day before meals  insulin lispro (ADMELOG) corrective regimen sliding scale   SubCutaneous at bedtime  levothyroxine 25 MICROGram(s) Oral daily  predniSONE   Tablet 40 milliGRAM(s) Oral daily    MEDICATIONS  (PRN):  acetaminophen     Tablet .. 650 milliGRAM(s) Oral every 6 hours PRN Temp greater or equal to 38C (100.4F), Mild Pain (1 - 3)  aluminum hydroxide/magnesium hydroxide/simethicone Suspension 30 milliLiter(s) Oral every 4 hours PRN Dyspepsia  dextrose Oral Gel 15 Gram(s) Oral once PRN Blood Glucose LESS THAN 70 milliGRAM(s)/deciliter  melatonin 3 milliGRAM(s) Oral at bedtime PRN Insomnia  ondansetron Injectable 4 milliGRAM(s) IV Push every 8 hours PRN Nausea and/or Vomiting    Vital Signs Last 24 Hrs  T(F): 97.3 (2023 06:06), Max: 97.9 (2023 21:26)  HR: 109 (2023 09:02) (74 - 109)  BP: 179/95 (2023 06:06) (131/75 - 179/95)  RR: 19 (2023 06:06) (16 - 24)  SpO2: 97% (2023 09:02) (93% - 100%)  I&O's Summary    PHYSICAL EXAM:  General: NAD, Awake, [place]  ENT: gross hearing impairment, Moist mucous membranes, no thrush  Neck: Supple, No JVD  Lungs: Wheezing to auscultation bilaterally, good air entry, non-labored breathing  Cardio: RRR, S1/S2, + murmur  Abdomen: Soft, Nontender, Nondistended; Bowel sounds present  Extremities: No calf tenderness, No cyanosis, No pitting edema    LABS:                        8.1    7.77  )-----------( 314      ( 2023 08:31 )             25.3     04-08    128  |  93  |  37  ----------------------------<  284  6.1   |  28  |  1.93    Ca    8.8      2023 08:31  Phos  4.5     04-08  Mg     2.4     04-08    TPro  6.5  /  Alb  2.8  /  TBili  0.2  /  DBili  x   /  AST  9   /  ALT  7   /  AlkPhos  76  04-07          PT/INR - ( 2023 15:30 )   PT: 11.0 sec;   INR: 0.95 ratio         PTT - ( 2023 15:30 )  PTT:31.7 sec  Lactate, Blood: 0.9 mmol/L (04- @ 15:30)      CARDIAC MARKERS ( 2023 20:10 )  x     / 18.9 ng/L / x     / x     / x            TSH 1.383   TSH with FT4 reflex --  Total T3 --      ABG - ( 2023 15:59 )  pH, Arterial: 7.40  pH, Blood: x     /  pCO2: 45    /  pO2: 196   / HCO3: 28    / Base Excess: 3.1   /  SaO2: 99.7                    POCT Blood Glucose.: 253 mg/dL (2023 12:20)  POCT Blood Glucose.: 116 mg/dL (2023 12:17)  POCT Blood Glucose.: 282 mg/dL (2023 08:17)  POCT Blood Glucose.: 142 mg/dL (2023 22:02)      Urinalysis Basic - ( 2023 18:55 )    Color: Yellow / Appearance: Clear / S.010 / pH: x  Gluc: x / Ketone: Negative  / Bili: Negative / Urobili: Negative   Blood: x / Protein: 30 mg/dL / Nitrite: Negative   Leuk Esterase: Moderate / RBC: 11-25 /HPF / WBC 11-25 /HPF   Sq Epi: x / Non Sq Epi: x / Bacteria: Moderate /HPF          RADIOLOGY & ADDITIONAL TESTS: < from: Xray Chest 1 View-PORTABLE IMMEDIATE (23 @ 16:09) >  IMPRESSION: No acute finding.    < end of copied text >    Care Discussed with Consultants/Other Providers:    Patient is a 89y old Female who presents with a chief complaint of Shortness of Breath (2023 10:06)      Patient seen and examined at bedside. No overnight events reported.     ALLERGIES:  No Known Allergies    MEDICATIONS  (STANDING):  albuterol    0.083% 2.5 milliGRAM(s) Nebulizer every 6 hours  albuterol    90 MICROgram(s) HFA Inhaler 1 Puff(s) Inhalation every 4 hours  amLODIPine   Tablet 5 milliGRAM(s) Oral daily  apixaban 2.5 milliGRAM(s) Oral every 12 hours  atorvastatin 20 milliGRAM(s) Oral at bedtime  cloNIDine 0.2 milliGRAM(s) Oral two times a day  dextrose 5%. 1000 milliLiter(s) (50 mL/Hr) IV Continuous <Continuous>  dextrose 5%. 1000 milliLiter(s) (100 mL/Hr) IV Continuous <Continuous>  dextrose 50% Injectable 25 Gram(s) IV Push once  dextrose 50% Injectable 12.5 Gram(s) IV Push once  dextrose 50% Injectable 25 Gram(s) IV Push once  glucagon  Injectable 1 milliGRAM(s) IntraMuscular once  insulin lispro (ADMELOG) corrective regimen sliding scale   SubCutaneous three times a day before meals  insulin lispro (ADMELOG) corrective regimen sliding scale   SubCutaneous at bedtime  levothyroxine 25 MICROGram(s) Oral daily  predniSONE   Tablet 40 milliGRAM(s) Oral daily    MEDICATIONS  (PRN):  acetaminophen     Tablet .. 650 milliGRAM(s) Oral every 6 hours PRN Temp greater or equal to 38C (100.4F), Mild Pain (1 - 3)  aluminum hydroxide/magnesium hydroxide/simethicone Suspension 30 milliLiter(s) Oral every 4 hours PRN Dyspepsia  dextrose Oral Gel 15 Gram(s) Oral once PRN Blood Glucose LESS THAN 70 milliGRAM(s)/deciliter  melatonin 3 milliGRAM(s) Oral at bedtime PRN Insomnia  ondansetron Injectable 4 milliGRAM(s) IV Push every 8 hours PRN Nausea and/or Vomiting    Vital Signs Last 24 Hrs  T(F): 97.3 (2023 06:06), Max: 97.9 (2023 21:26)  HR: 109 (2023 09:02) (74 - 109)  BP: 179/95 (2023 06:06) (131/75 - 179/95)  RR: 19 (2023 06:06) (16 - 24)  SpO2: 97% (2023 09:02) (93% - 100%)  I&O's Summary    PHYSICAL EXAM:  General: NAD, Awake, [place]  ENT: gross hearing impairment, Moist mucous membranes, no thrush  Neck: Supple, No JVD  Lungs: Wheezing to auscultation bilaterally, good air entry, non-labored breathing  Cardio: RRR, S1/S2, + murmur  Abdomen: Soft, Nontender, Nondistended; Bowel sounds present  Extremities: No calf tenderness, No cyanosis, No pitting edema    LABS:                        8.1    7.77  )-----------( 314      ( 2023 08:31 )             25.3     04-08    128  |  93  |  37  ----------------------------<  284  6.1   |  28  |  1.93    Ca    8.8      2023 08:31  Phos  4.5     04-08  Mg     2.4     04-08    TPro  6.5  /  Alb  2.8  /  TBili  0.2  /  DBili  x   /  AST  9   /  ALT  7   /  AlkPhos  76  04-07          PT/INR - ( 2023 15:30 )   PT: 11.0 sec;   INR: 0.95 ratio         PTT - ( 2023 15:30 )  PTT:31.7 sec  Lactate, Blood: 0.9 mmol/L (04- @ 15:30)      CARDIAC MARKERS ( 2023 20:10 )  x     / 18.9 ng/L / x     / x     / x            TSH 1.383   TSH with FT4 reflex --  Total T3 --      ABG - ( 2023 15:59 )  pH, Arterial: 7.40  pH, Blood: x     /  pCO2: 45    /  pO2: 196   / HCO3: 28    / Base Excess: 3.1   /  SaO2: 99.7                    POCT Blood Glucose.: 253 mg/dL (2023 12:20)  POCT Blood Glucose.: 116 mg/dL (2023 12:17)  POCT Blood Glucose.: 282 mg/dL (2023 08:17)  POCT Blood Glucose.: 142 mg/dL (2023 22:02)      Urinalysis Basic - ( 2023 18:55 )    Color: Yellow / Appearance: Clear / S.010 / pH: x  Gluc: x / Ketone: Negative  / Bili: Negative / Urobili: Negative   Blood: x / Protein: 30 mg/dL / Nitrite: Negative   Leuk Esterase: Moderate / RBC: 11-25 /HPF / WBC 11-25 /HPF   Sq Epi: x / Non Sq Epi: x / Bacteria: Moderate /HPF          RADIOLOGY & ADDITIONAL TESTS: < from: Xray Chest 1 View-PORTABLE IMMEDIATE (23 @ 16:09) >  IMPRESSION: No acute finding.    < end of copied text >    < from: TTE Echo Complete w/o Contrast w/ Doppler (23 @ 10:00) >  Summary:   1. 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 Mitral annular calcification   3. Biatrial enlargement   4. Left ventricular ejectionfraction, by visual estimation, is 55 to   60%.   5. Normal global left ventricular systolic function.   6. Spectral Doppler shows restrictive pattern of left ventricular   myocardial filling (Grade III diastolic dysfunction).   7. Mild-moderate tricuspid regurgitation.   8. Mild pulmonic valve regurgitation.   9. Estimated pulmonary artery systolic pressure is 60.4 mmHg assuming a   right atrial pressure of 8 mmHg, which is consistent with severe   pulmonary hypertension.    < end of copied text >      Care Discussed with Consultants/Other Providers:    Patient is a 89y old Female who presents with a chief complaint of Shortness of Breath (2023 10:06)      Patient seen and examined at bedside. No overnight events reported.     ALLERGIES:  No Known Allergies    MEDICATIONS  (STANDING):  albuterol    0.083% 2.5 milliGRAM(s) Nebulizer every 6 hours  albuterol    90 MICROgram(s) HFA Inhaler 1 Puff(s) Inhalation every 4 hours  amLODIPine   Tablet 5 milliGRAM(s) Oral daily  apixaban 2.5 milliGRAM(s) Oral every 12 hours  atorvastatin 20 milliGRAM(s) Oral at bedtime  cloNIDine 0.2 milliGRAM(s) Oral two times a day  dextrose 5%. 1000 milliLiter(s) (50 mL/Hr) IV Continuous <Continuous>  dextrose 5%. 1000 milliLiter(s) (100 mL/Hr) IV Continuous <Continuous>  dextrose 50% Injectable 25 Gram(s) IV Push once  dextrose 50% Injectable 12.5 Gram(s) IV Push once  dextrose 50% Injectable 25 Gram(s) IV Push once  glucagon  Injectable 1 milliGRAM(s) IntraMuscular once  insulin lispro (ADMELOG) corrective regimen sliding scale   SubCutaneous three times a day before meals  insulin lispro (ADMELOG) corrective regimen sliding scale   SubCutaneous at bedtime  levothyroxine 25 MICROGram(s) Oral daily  predniSONE   Tablet 40 milliGRAM(s) Oral daily    MEDICATIONS  (PRN):  acetaminophen     Tablet .. 650 milliGRAM(s) Oral every 6 hours PRN Temp greater or equal to 38C (100.4F), Mild Pain (1 - 3)  aluminum hydroxide/magnesium hydroxide/simethicone Suspension 30 milliLiter(s) Oral every 4 hours PRN Dyspepsia  dextrose Oral Gel 15 Gram(s) Oral once PRN Blood Glucose LESS THAN 70 milliGRAM(s)/deciliter  melatonin 3 milliGRAM(s) Oral at bedtime PRN Insomnia  ondansetron Injectable 4 milliGRAM(s) IV Push every 8 hours PRN Nausea and/or Vomiting    Vital Signs Last 24 Hrs  T(F): 97.3 (2023 06:06), Max: 97.9 (2023 21:26)  HR: 109 (2023 09:02) (74 - 109)  BP: 179/95 (2023 06:06) (131/75 - 179/95)  RR: 19 (2023 06:06) (16 - 24)  SpO2: 97% (2023 09:02) (93% - 100%)  I&O's Summary    PHYSICAL EXAM:  General: NAD, Awake, [place]  ENT: gross hearing impairment, Moist mucous membranes, no thrush  Neck: Supple, No JVD  Lungs: Wheezing to auscultation bilaterally, good air entry, non-labored breathing  Cardio: RRR, S1/S2, + murmur  Abdomen: Soft, Nontender, Nondistended; Bowel sounds present  Extremities: No calf tenderness, No cyanosis, No pitting edema    LABS:                        8.1    7.77  )-----------( 314      ( 2023 08:31 )             25.3     04-08    128  |  93  |  37  ----------------------------<  284  6.1   |  28  |  1.93    Ca    8.8      2023 08:31  Phos  4.5     04-08  Mg     2.4     04-08    TPro  6.5  /  Alb  2.8  /  TBili  0.2  /  DBili  x   /  AST  9   /  ALT  7   /  AlkPhos  76  04-07          PT/INR - ( 2023 15:30 )   PT: 11.0 sec;   INR: 0.95 ratio         PTT - ( 2023 15:30 )  PTT:31.7 sec  Lactate, Blood: 0.9 mmol/L (04- @ 15:30)      CARDIAC MARKERS ( 2023 20:10 )  x     / 18.9 ng/L / x     / x     / x            TSH 1.383   TSH with FT4 reflex --  Total T3 --      ABG - ( 2023 15:59 )  pH, Arterial: 7.40  pH, Blood: x     /  pCO2: 45    /  pO2: 196   / HCO3: 28    / Base Excess: 3.1   /  SaO2: 99.7                    POCT Blood Glucose.: 253 mg/dL (2023 12:20)  POCT Blood Glucose.: 116 mg/dL (2023 12:17)  POCT Blood Glucose.: 282 mg/dL (2023 08:17)  POCT Blood Glucose.: 142 mg/dL (2023 22:02)      Urinalysis Basic - ( 2023 18:55 )    Color: Yellow / Appearance: Clear / S.010 / pH: x  Gluc: x / Ketone: Negative  / Bili: Negative / Urobili: Negative   Blood: x / Protein: 30 mg/dL / Nitrite: Negative   Leuk Esterase: Moderate / RBC: 11-25 /HPF / WBC 11-25 /HPF   Sq Epi: x / Non Sq Epi: x / Bacteria: Moderate /HPF          RADIOLOGY & ADDITIONAL TESTS: < from: Xray Chest 1 View-PORTABLE IMMEDIATE (23 @ 16:09) >  IMPRESSION: No acute finding.    < end of copied text >    < from: TTE Echo Complete w/o Contrast w/ Doppler (23 @ 10:00) >  Summary:   1. 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 Mitral annular calcification   3. Biatrial enlargement   4. Left ventricular ejectionfraction, by visual estimation, is 55 to   60%.   5. Normal global left ventricular systolic function.   6. Spectral Doppler shows restrictive pattern of left ventricular   myocardial filling (Grade III diastolic dysfunction).   7. Mild-moderate tricuspid regurgitation.   8. Mild pulmonic valve regurgitation.   9. Estimated pulmonary artery systolic pressure is 60.4 mmHg assuming a   right atrial pressure of 8 mmHg, which is consistent with severe   pulmonary hypertension.    < end of copied text >      Care Discussed with Consultants/Other Providers:    Patient is a 89y old Female who presents with a chief complaint of Shortness of Breath (2023 10:06)      Patient seen and examined at bedside. No overnight events reported.     ALLERGIES:  No Known Allergies    MEDICATIONS  (STANDING):  albuterol    0.083% 2.5 milliGRAM(s) Nebulizer every 6 hours  albuterol    90 MICROgram(s) HFA Inhaler 1 Puff(s) Inhalation every 4 hours  amLODIPine   Tablet 5 milliGRAM(s) Oral daily  apixaban 2.5 milliGRAM(s) Oral every 12 hours  atorvastatin 20 milliGRAM(s) Oral at bedtime  cloNIDine 0.2 milliGRAM(s) Oral two times a day  dextrose 5%. 1000 milliLiter(s) (50 mL/Hr) IV Continuous <Continuous>  dextrose 5%. 1000 milliLiter(s) (100 mL/Hr) IV Continuous <Continuous>  dextrose 50% Injectable 25 Gram(s) IV Push once  dextrose 50% Injectable 12.5 Gram(s) IV Push once  dextrose 50% Injectable 25 Gram(s) IV Push once  glucagon  Injectable 1 milliGRAM(s) IntraMuscular once  insulin lispro (ADMELOG) corrective regimen sliding scale   SubCutaneous three times a day before meals  insulin lispro (ADMELOG) corrective regimen sliding scale   SubCutaneous at bedtime  levothyroxine 25 MICROGram(s) Oral daily  predniSONE   Tablet 40 milliGRAM(s) Oral daily    MEDICATIONS  (PRN):  acetaminophen     Tablet .. 650 milliGRAM(s) Oral every 6 hours PRN Temp greater or equal to 38C (100.4F), Mild Pain (1 - 3)  aluminum hydroxide/magnesium hydroxide/simethicone Suspension 30 milliLiter(s) Oral every 4 hours PRN Dyspepsia  dextrose Oral Gel 15 Gram(s) Oral once PRN Blood Glucose LESS THAN 70 milliGRAM(s)/deciliter  melatonin 3 milliGRAM(s) Oral at bedtime PRN Insomnia  ondansetron Injectable 4 milliGRAM(s) IV Push every 8 hours PRN Nausea and/or Vomiting    Vital Signs Last 24 Hrs  T(F): 97.3 (2023 06:06), Max: 97.9 (2023 21:26)  HR: 109 (2023 09:02) (74 - 109)  BP: 179/95 (2023 06:06) (131/75 - 179/95)  RR: 19 (2023 06:06) (16 - 24)  SpO2: 97% (2023 09:02) (93% - 100%)  I&O's Summary    PHYSICAL EXAM:  General: NAD, Awake, [place]  ENT: gross hearing impairment, Moist mucous membranes, no thrush  Neck: Supple, No JVD  Lungs: Wheezing to auscultation bilaterally, good air entry, non-labored breathing  Cardio: RRR, S1/S2, + murmur  Abdomen: Soft, Nontender, Nondistended; Bowel sounds present  Extremities: No calf tenderness, No cyanosis, No pitting edema    LABS:                        8.1    7.77  )-----------( 314      ( 2023 08:31 )             25.3     04-08    128  |  93  |  37  ----------------------------<  284  6.1   |  28  |  1.93    Ca    8.8      2023 08:31  Phos  4.5     04-08  Mg     2.4     04-08    TPro  6.5  /  Alb  2.8  /  TBili  0.2  /  DBili  x   /  AST  9   /  ALT  7   /  AlkPhos  76  04-07          PT/INR - ( 2023 15:30 )   PT: 11.0 sec;   INR: 0.95 ratio         PTT - ( 2023 15:30 )  PTT:31.7 sec  Lactate, Blood: 0.9 mmol/L (04- @ 15:30)      CARDIAC MARKERS ( 2023 20:10 )  x     / 18.9 ng/L / x     / x     / x            TSH 1.383   TSH with FT4 reflex --  Total T3 --      ABG - ( 2023 15:59 )  pH, Arterial: 7.40  pH, Blood: x     /  pCO2: 45    /  pO2: 196   / HCO3: 28    / Base Excess: 3.1   /  SaO2: 99.7                    POCT Blood Glucose.: 253 mg/dL (2023 12:20)  POCT Blood Glucose.: 116 mg/dL (2023 12:17)  POCT Blood Glucose.: 282 mg/dL (2023 08:17)  POCT Blood Glucose.: 142 mg/dL (2023 22:02)      Urinalysis Basic - ( 2023 18:55 )    Color: Yellow / Appearance: Clear / S.010 / pH: x  Gluc: x / Ketone: Negative  / Bili: Negative / Urobili: Negative   Blood: x / Protein: 30 mg/dL / Nitrite: Negative   Leuk Esterase: Moderate / RBC: 11-25 /HPF / WBC 11-25 /HPF   Sq Epi: x / Non Sq Epi: x / Bacteria: Moderate /HPF          RADIOLOGY & ADDITIONAL TESTS: < from: Xray Chest 1 View-PORTABLE IMMEDIATE (23 @ 16:09) >  IMPRESSION: No acute finding.    < end of copied text >    < from: TTE Echo Complete w/o Contrast w/ Doppler (23 @ 10:00) >  Summary:   1. 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 Mitral annular calcification   3. Biatrial enlargement   4. Left ventricular ejectionfraction, by visual estimation, is 55 to   60%.   5. Normal global left ventricular systolic function.   6. Spectral Doppler shows restrictive pattern of left ventricular   myocardial filling (Grade III diastolic dysfunction).   7. Mild-moderate tricuspid regurgitation.   8. Mild pulmonic valve regurgitation.   9. Estimated pulmonary artery systolic pressure is 60.4 mmHg assuming a   right atrial pressure of 8 mmHg, which is consistent with severe   pulmonary hypertension.    < end of copied text >      Care Discussed with Consultants/Other Providers:

## 2023-04-08 NOTE — PATIENT PROFILE ADULT - CAREGIVER ADDRESS
[address] [facility] [id number] [address] [facility] [id number] [address] [facility] [id number]

## 2023-04-08 NOTE — CONSULT NOTE ADULT - SUBJECTIVE AND OBJECTIVE BOX
Chief Complaint: sob    HPI: 89 yr old woman recently here from [place] presents with recurrent sob. she is now found to be in AF which is new apparently. patient unable to give an hx. is hard of hearing and cannot cooperate with  phone    PMH:   Moderate asthma            Social History:  Smoking:unknown  Alcohol:unknown   Drugs:unknown    Allergies:  No Known Allergies      Medications:  acetaminophen     Tablet .. 650 milliGRAM(s) Oral every 6 hours PRN  albuterol    0.083% 2.5 milliGRAM(s) Nebulizer every 6 hours  albuterol    90 MICROgram(s) HFA Inhaler 1 Puff(s) Inhalation every 4 hours  aluminum hydroxide/magnesium hydroxide/simethicone Suspension 30 milliLiter(s) Oral every 4 hours PRN  amLODIPine   Tablet 5 milliGRAM(s) Oral daily  apixaban 2.5 milliGRAM(s) Oral every 12 hours  atorvastatin 20 milliGRAM(s) Oral at bedtime  cloNIDine 0.2 milliGRAM(s) Oral two times a day  dextrose 5%. 1000 milliLiter(s) IV Continuous <Continuous>  dextrose 5%. 1000 milliLiter(s) IV Continuous <Continuous>  dextrose 50% Injectable 25 Gram(s) IV Push once  dextrose 50% Injectable 12.5 Gram(s) IV Push once  dextrose 50% Injectable 25 Gram(s) IV Push once  dextrose Oral Gel 15 Gram(s) Oral once PRN  glucagon  Injectable 1 milliGRAM(s) IntraMuscular once  insulin lispro (ADMELOG) corrective regimen sliding scale   SubCutaneous three times a day before meals  insulin lispro (ADMELOG) corrective regimen sliding scale   SubCutaneous at bedtime  levothyroxine 25 MICROGram(s) Oral daily  melatonin 3 milliGRAM(s) Oral at bedtime PRN  ondansetron Injectable 4 milliGRAM(s) IV Push every 8 hours PRN  predniSONE   Tablet 40 milliGRAM(s) Oral daily      REVIEW OF SYSTEMS:  CONSTITUTIONAL: No fever, weight loss, or fatigue  EYES: No eye pain, visual disturbances, or discharge  ENMT:  No difficulty hearing, tinnitus, vertigo; No sinus or throat pain  NECK: No pain or stiffness  BREASTS: No pain, masses, or nipple discharge  RESPIRATORY: No cough, wheezing, chills or hemoptysis; No shortness of breath  CARDIOVASCULAR: No chest pain, palpitations, dizziness, or leg swelling  GASTROINTESTINAL: No abdominal or epigastric pain. No nausea, vomiting, or hematemesis; No diarrhea or constipation. No melena or hematochezia.  GENITOURINARY: No dysuria, frequency, hematuria, or incontinence  NEUROLOGICAL: No headaches, memory loss, loss of strength, numbness, or tremors  SKIN: No itching, burning, rashes, or lesions   LYMPH NODES: No enlarged glands  ENDOCRINE: No heat or cold intolerance; No hair loss  MUSCULOSKELETAL: No joint pain or swelling; No muscle, back, or extremity pain  PSYCHIATRIC: No depression, anxiety, mood swings, or difficulty sleeping  HEME/LYMPH: No easy bruising, or bleeding gums  ALLERY AND IMMUNOLOGIC: No hives or eczema    Physical Exam:  T(C): 36.3 (04-08-23 @ 06:06), Max: 36.6 (04-07-23 @ 21:26)  HR: 109 (04-08-23 @ 09:02) (74 - 109)  BP: 179/95 (04-08-23 @ 06:06) (131/75 - 179/95)  RR: 19 (04-08-23 @ 06:06) (16 - 24)  SpO2: 97% (04-08-23 @ 09:02) (93% - 100%)  Wt(kg): --    GENERAL: NAD, well-groomed, well-developed  HEAD:  Atraumatic, Normocephalic  EYES: EOMI, conjunctiva and sclera clear  ENT: Moist mucous membranes,  NECK: Supple, No JVD, no bruits  CHEST/LUNG: FAIR AIR ENTRY/BILATERAL INSPIRATORY AND EXPIRATORY WHEEZING  HEART: S1S2 somewhat distant 2/6 murmur heard throughout precordium  ABDOMEN: Soft, Nontender, Nondistended; Bowel sounds present  EXTREMITIES:  2+ Peripheral Pulses, No clubbing, cyanosis, or edema  SKIN: No rashes or lesions  NERVOUS SYSTEM:  Alert & Oriented X3, Good concentration; Motor Strength 5/5 B/L upper and lower extremities; DTRs 2+ intact and symmetric    Cardiovascular Diagnostic Testing:  ECG: af with moderate response much baseline artifact    Labs:                        8.1    7.77  )-----------( 314      ( 08 Apr 2023 08:31 )             25.3     04-08    128<L>  |  93<L>  |  37<H>  ----------------------------<  284<H>  6.1<H>   |  28  |  1.93<H>    Ca    8.8      08 Apr 2023 08:31  Phos  4.5     04-08  Mg     2.4     04-08    TPro  6.5  /  Alb  2.8<L>  /  TBili  0.2  /  DBili  x   /  AST  9<L>  /  ALT  7<L>  /  AlkPhos  76  04-07    PT/INR - ( 07 Apr 2023 15:30 )   PT: 11.0 sec;   INR: 0.95 ratio         PTT - ( 07 Apr 2023 15:30 )  PTT:31.7 sec            Thyroid Stimulating Hormone, Serum: 1.383 uIU/mL (04-08 @ 08:31)      Imaging:cxr- no significant lung pathology

## 2023-04-08 NOTE — PROGRESS NOTE ADULT - ASSESSMENT
89F (Gujarati-speaking) with HTN, asthma, DM2, hypothyroidism, recent hospitalization in [place] for "trouble breathing", returned from [place] 5 days ago, comes to the ED with SOB, diagnosed with asthma exacerbation secondary to RSV    #Asthma exacerbation  #RSV infection  -Patient satting 97% RA  -Will check CT chest non-contrast as the CXR does not show any evidence of acute pulmonary disease, but has been hospitalized twice now (here, and recently in [place]) for acute respiratory distress  -Continue PO steroids for now (got IV in the ED).... taper schedule to be determined  -Albuterol nebs q6h for now  -d-dimer 506,  f/u doppler US B/L LE, consider V/Q scan as Cr elevated (patient recently on plane ride from [place])    #New A-fib  -Will start Eliquis (will stop ASA for now, no hx of CAD or stroke, risk of bleeding on ASA + Eliquis > benefits)  -Cardio consult  -F/U Echo  -troponin negative   -d-dimer positive, would consider V/Q... although will start with leg dopplers and echo to assess for RV strain - currently on A/C    #Essential HTN  -c/w Norvasc, Clonidine    #Hypothyroidism  -c/w Synthroid  -TSH reviewed wnl    #Hyperkalemia  -f/u repeat BMP, no EKG changes  -Ordered 1 dose of kayexalate     #Hyponatremia  -Monitor BMP    #Bilateral leg swelling  -improved this am  -F/U US to r/o DVT (recent plane ride)  -Could be from low albumen state  -does not appear to be in overt heart failure at this time despite elevated pro-BNP    #HLD  -c/w statin    #DM2  -hold oral meds (Metformin, Glimeperide)  -start ISS  -POCT glucose testing  -F/U A1C    #PREM vs CKD3  -Prior Cr 1.15 (2017) and 1.55 (2022)  -Unclear if we are dealing with PREM on CKD3, vs if this is the new baseline  -Hold Metformin  -Started IVF as pt appears dry  -F/U BMP     #Anemia of chronic disease  -Prior Hb 8-9  -Downtrending this am   -Continue to monitor    #DVT ppx: Eliquis    Case d/w patient's PMD, Dr. Merchant, [phone], who is involved in her care and would appreciate any updates  4/8 Case d/w patient's son, Martin Santana, [phone]    FULL CODE at this time 89F (Gujarati-speaking) with HTN, asthma, DM2, hypothyroidism, recent hospitalization in [place] for "trouble breathing", returned from [place] 5 days ago, comes to the ED with SOB, diagnosed with asthma exacerbation secondary to RSV    #Asthma exacerbation  #RSV infection  -Patient satting 97% RA  -Will check CT chest non-contrast as the CXR does not show any evidence of acute pulmonary disease, but has been hospitalized twice now (here, and recently in [place]) for acute respiratory distress  -Continue PO steroids for now (got IV in the ED).... taper schedule to be determined  -Albuterol nebs q6h for now  -d-dimer 506,  f/u doppler US B/L LE, consider V/Q scan as Cr elevated (patient recently on plane ride from [place])    #New A-fib  -Will start Eliquis (will stop ASA for now, no hx of CAD or stroke, risk of bleeding on ASA + Eliquis > benefits)  -Cardio consult  -F/U Echo  -troponin negative   -d-dimer positive, would consider V/Q... although will start with leg dopplers and echo to assess for RV strain - currently on A/C    #Essential HTN  -c/w Norvasc, Clonidine    #Hypothyroidism  -c/w Synthroid  -TSH reviewed wnl    #Hyperkalemia  -f/u repeat BMP, no EKG changes  -Ordered 1 dose of kayexalate     #Hyponatremia  -Monitor BMP    #Bilateral leg swelling  -improved this am  -F/U US to r/o DVT (recent plane ride)  -Could be from low albumen state  -does not appear to be in overt heart failure at this time despite elevated pro-BNP    #HLD  -c/w statin    #DM2  -hold oral meds (Metformin, Glimeperide)  -start ISS  -POCT glucose testing  -F/U A1C    #PREM vs CKD3  -Prior Cr 1.15 (2017) and 1.55 (2022)  -Unclear if we are dealing with PREM on CKD3, vs if this is the new baseline  -Hold Metformin  -Started IVF as pt appears dry  -F/U BMP     #Anemia of chronic disease  -Prior Hb 8-9  -Downtrending this am   -Continue to monitor    #DVT ppx: Eliquis    Case d/w patient's PMD, Dr. Merchant, [phone], who is involved in her care and would appreciate any updates  4/8 Case d/w patient's son, Martin Santana, [phone]    FULL CODE at this time 89F (Gujarati-speaking) with HTN, asthma, DM2, hypothyroidism, recent hospitalization in [place] for "trouble breathing", returned from [place] 5 days ago, comes to the ED with SOB, diagnosed with asthma exacerbation secondary to RSV    #Asthma exacerbation  #RSV infection  -Patient satting 97% RA  -Will check CT chest non-contrast as the CXR does not show any evidence of acute pulmonary disease, but has been hospitalized twice now (here, and recently in [place]) for acute respiratory distress  -Continue PO steroids for now (got IV in the ED).... taper schedule to be determined  -Albuterol nebs q6h for now  -d-dimer 506,  f/u doppler US B/L LE, consider V/Q scan as Cr elevated (patient recently on plane ride from [place])    #New A-fib  -Will start Eliquis (will stop ASA for now, no hx of CAD or stroke, risk of bleeding on ASA + Eliquis > benefits)  -Cardio consult  -F/U Echo  -troponin negative   -d-dimer positive, would consider V/Q... although will start with leg dopplers and echo to assess for RV strain - currently on A/C    #Essential HTN  -c/w Norvasc, Clonidine    #Hypothyroidism  -c/w Synthroid  -TSH reviewed wnl    #Hyperkalemia  -f/u repeat BMP, no EKG changes  -Ordered 1 dose of kayexalate     #Hyponatremia  -Monitor BMP    #Bilateral leg swelling  -improved this am  -F/U US to r/o DVT (recent plane ride)  -Could be from low albumen state  -does not appear to be in overt heart failure at this time despite elevated pro-BNP    #HLD  -c/w statin    #DM2  -hold oral meds (Metformin, Glimeperide)  -start ISS  -POCT glucose testing  -F/U A1C    #PREM vs CKD3  -Prior Cr 1.15 (2017) and 1.55 (2022)  -Unclear if we are dealing with PREM on CKD3, vs if this is the new baseline  -Hold Metformin  -Started IVF as pt appears dry  -F/U BMP     #Anemia of chronic disease  -Prior Hb 8-9  -Downtrending this am   -Continue to monitor    #DVT ppx: Eliquis    Case d/w patient's PMD, Dr. Merchant, [phone], who is involved in her care and would appreciate any updates  4/8 Case d/w patient's son, Martin Santana, [phone]    FULL CODE at this time 89F (Gujarati-speaking) with HTN, asthma, DM2, hypothyroidism, recent hospitalization in [place] for "trouble breathing", returned from [place] 5 days ago, comes to the ED with SOB, diagnosed with asthma exacerbation secondary to RSV    #Asthma exacerbation  #RSV infection  -Patient satting 97% RA  -Will check CT chest non-contrast as the CXR does not show any evidence of acute pulmonary disease, but has been hospitalized twice now (here, and recently in [place]) for acute respiratory distress  -Continue PO steroids for now (got IV in the ED).... taper schedule to be determined  -Albuterol nebs q6h for now  -d-dimer 506,  f/u doppler US B/L LE, consider V/Q scan as Cr elevated (patient recently on plane ride from [place])  -F/U blood cultures    #New A-fib  -Will start Eliquis (will stop ASA for now, no hx of CAD or stroke, risk of bleeding on ASA + Eliquis > benefits)  -Cardio consult  -F/U Echo  -troponin negative   -d-dimer positive, would consider V/Q... although will start with leg dopplers and echo to assess for RV strain - currently on A/C    #Essential HTN  -c/w Norvasc, Clonidine    #Hypothyroidism  -c/w Synthroid  -TSH reviewed wnl    #Hyperkalemia  -f/u repeat BMP, no EKG changes  -Ordered 1 dose of kayexalate     #Hyponatremia  -Monitor BMP    #Bilateral leg swelling  -improved this am  -F/U US to r/o DVT (recent plane ride)  -Could be from low albumen state  -does not appear to be in overt heart failure at this time despite elevated pro-BNP    #HLD  -c/w statin    #DM2  -hold oral meds (Metformin, Glimeperide)  -start ISS  -POCT glucose testing  -F/U A1C    #PREM vs CKD3  -Prior Cr 1.15 (2017) and 1.55 (2022)  -Unclear if we are dealing with PREM on CKD3, vs if this is the new baseline  -Hold Metformin  -Started IVF as pt appears dry  -F/U BMP     #Anemia of chronic disease  -Prior Hb 8-9  -Downtrending this am   -Continue to monitor  -F/U FOBT    #DVT ppx: Eliquis    Case d/w patient's PMD, Dr. Merchant, [phone], who is involved in her care and would appreciate any updates  4/8 Case d/w patient's son, Martin Santana, [phone]    FULL CODE at this time 89F (Gujarati-speaking) with HTN, asthma, DM2, hypothyroidism, recent hospitalization in [place] for "trouble breathing", returned from [place] 5 days ago, comes to the ED with SOB, diagnosed with asthma exacerbation secondary to RSV    #Asthma exacerbation  #RSV infection  -Patient satting 97% RA  -Will check CT chest non-contrast as the CXR does not show any evidence of acute pulmonary disease, but has been hospitalized twice now (here, and recently in [place]) for acute respiratory distress  -Continue PO steroids for now (got IV in the ED).... taper schedule to be determined  -Albuterol nebs q6h for now  -d-dimer 506,  f/u doppler US B/L LE, consider V/Q scan as Cr elevated (patient recently on plane ride from [place])  -F/U blood cultures    #New A-fib  -Will start Eliquis (will stop ASA for now, no hx of CAD or stroke, risk of bleeding on ASA + Eliquis > benefits)  -Cardio consult  -F/U Echo  -troponin negative   -d-dimer positive, would consider V/Q... although will start with leg dopplers and echo to assess for RV strain - currently on A/C    #Essential HTN  -c/w Norvasc, Clonidine    #Hypothyroidism  -c/w Synthroid  -TSH reviewed wnl    #Hyperkalemia  -f/u repeat BMP, no EKG changes  -Ordered 1 dose of kayexalate     #Hyponatremia  -Monitor BMP    #Bilateral leg swelling  -improved this am  -F/U US to r/o DVT (recent plane ride)  -Could be from low albumen state  -does not appear to be in overt heart failure at this time despite elevated pro-BNP    #HLD  -c/w statin    #DM2  -hold oral meds (Metformin, Glimeperide)  -start ISS  -POCT glucose testing  -F/U A1C    #PREM vs CKD3  -Prior Cr 1.15 (2017) and 1.55 (2022)  -Unclear if we are dealing with PREM on CKD3, vs if this is the new baseline  -Hold Metformin  -Started IVF as pt appears dry  -F/U BMP     #Anemia of chronic disease  -Prior Hb 8-9  -Downtrending this am   -Continue to monitor  -F/U FOBT    #DVT ppx: Eliquis    Case d/w patient's PMD, Dr. Merchant, [phone], who is involved in her care and would appreciate any updates  4/8 Case d/w patient's son, Martin Santana, [phone]    FULL CODE at this time 89F (Gujarati-speaking) with HTN, asthma, DM2, hypothyroidism, recent hospitalization in [place] for "trouble breathing", returned from [place] 5 days ago, comes to the ED with SOB, diagnosed with asthma exacerbation secondary to RSV    #Asthma exacerbation  #RSV infection  -Patient satting 97% RA  -Will check CT chest non-contrast as the CXR does not show any evidence of acute pulmonary disease, but has been hospitalized twice now (here, and recently in [place]) for acute respiratory distress  -Continue PO steroids for now (got IV in the ED).... taper schedule to be determined  -Albuterol nebs q6h for now  -d-dimer 506,  f/u doppler US B/L LE, consider V/Q scan as Cr elevated (patient recently on plane ride from [place])  -F/U blood cultures    #New A-fib  -Will start Eliquis (will stop ASA for now, no hx of CAD or stroke, risk of bleeding on ASA + Eliquis > benefits)  -Cardio consult  -F/U Echo  -troponin negative   -d-dimer positive, would consider V/Q... although will start with leg dopplers and echo to assess for RV strain - currently on A/C    #Essential HTN  -c/w Norvasc, Clonidine    #Hypothyroidism  -c/w Synthroid  -TSH reviewed wnl    #Hyperkalemia  -f/u repeat BMP, no EKG changes  -Ordered 1 dose of kayexalate     #Hyponatremia  -Monitor BMP    #Bilateral leg swelling  -improved this am  -F/U US to r/o DVT (recent plane ride)  -Could be from low albumen state  -does not appear to be in overt heart failure at this time despite elevated pro-BNP    #HLD  -c/w statin    #DM2  -hold oral meds (Metformin, Glimeperide)  -start ISS  -POCT glucose testing  -F/U A1C    #PREM vs CKD3  -Prior Cr 1.15 (2017) and 1.55 (2022)  -Unclear if we are dealing with PREM on CKD3, vs if this is the new baseline  -Hold Metformin  -Started IVF as pt appears dry  -F/U BMP     #Anemia of chronic disease  -Prior Hb 8-9  -Downtrending this am   -Continue to monitor  -F/U FOBT    #DVT ppx: Eliquis    Case d/w patient's PMD, Dr. Merchant, [phone], who is involved in her care and would appreciate any updates  4/8 Case d/w patient's son, Martin Santana, [phone]    FULL CODE at this time 89F (Gujarati-speaking) with HTN, asthma, DM2, hypothyroidism, recent hospitalization in [place] for "trouble breathing", returned from [place] 5 days ago, comes to the ED with SOB, diagnosed with asthma exacerbation secondary to RSV    #Asthma exacerbation  #RSV infection  -Patient satting 97% RA  -CT chest non-contrast shows trace bilateral pleural effusions   -Continue PO steroids for now (got IV in the ED).... taper schedule to be determined  -Albuterol nebs q6h for now  -d-dimer 506,  f/u doppler US B/L LE, consider V/Q scan as Cr elevated (patient recently on plane ride from [place])  -F/U blood cultures    #New A-fib  -Will start Eliquis (will stop ASA for now, no hx of CAD or stroke, risk of bleeding on ASA + Eliquis > benefits)  -Cardio consult  -F/U Echo  -troponin negative   -d-dimer positive, would consider V/Q... although will start with leg dopplers and echo to assess for RV strain - currently on A/C    #Essential HTN  -c/w Norvasc, Clonidine    #Hypothyroidism  -c/w Synthroid  -TSH reviewed wnl    #Hyperkalemia  -f/u repeat BMP, no EKG changes  -Ordered 1 dose of kayexalate     #Hyponatremia  -Monitor BMP    #Bilateral leg swelling  -improved this am  -F/U US to r/o DVT (recent plane ride)  -Could be from low albumen state  -does not appear to be in overt heart failure at this time despite elevated pro-BNP    #HLD  -c/w statin    #DM2  -hold oral meds (Metformin, Glimeperide)  -start ISS  -POCT glucose testing  -F/U A1C    #PREM vs CKD3  -Prior Cr 1.15 (2017) and 1.55 (2022)  -Unclear if we are dealing with PREM on CKD3, vs if this is the new baseline  -Hold Metformin  -Started IVF as pt appears dry  -F/U BMP     #Anemia of chronic disease  -Prior Hb 8-9  -Downtrending this am   -Continue to monitor  -F/U FOBT  -F/U iron studies  -Iron supplement daily    #DVT ppx: Eliquis    Case d/w patient's PMD, Dr. Merchant, [phone], who is involved in her care and would appreciate any updates  4/8 Case d/w patient's son, Martin Santana, [phone]    FULL CODE at this time 89F (Gujarati-speaking) with HTN, asthma, DM2, hypothyroidism, recent hospitalization in [place] for "trouble breathing", returned from [place] 5 days ago, comes to the ED with SOB, diagnosed with asthma exacerbation secondary to RSV    #Asthma exacerbation  #RSV infection  -Patient satting 97% RA  -CT chest non-contrast shows trace bilateral pleural effusions   -Continue PO steroids for now (got IV in the ED).... taper schedule to be determined  -Albuterol nebs q6h for now  -d-dimer 506,  f/u doppler US B/L LE, consider V/Q scan as Cr elevated (patient recently on plane ride from [place])  -F/U blood cultures    #New A-fib  -Will start Eliquis (will stop ASA for now, no hx of CAD or stroke, risk of bleeding on ASA + Eliquis > benefits)  -Cardio consult  -F/U Echo  -troponin negative   -d-dimer positive, would consider V/Q... although will start with leg dopplers and echo to assess for RV strain - currently on A/C    #Essential HTN  -c/w Norvasc, Clonidine    #Hypothyroidism  -c/w Synthroid  -TSH reviewed wnl    #Hyperkalemia  -f/u repeat BMP, no EKG changes  -Ordered 1 dose of kayexalate     #Hyponatremia  -Monitor BMP    #Bilateral leg swelling  -improved this am  -F/U US to r/o DVT (recent plane ride)  -Could be from low albumen state  -does not appear to be in overt heart failure at this time despite elevated pro-BNP    #HLD  -c/w statin    #DM2  -hold oral meds (Metformin, Glimeperide)  -start ISS  -POCT glucose testing  -F/U A1C    #PREM vs CKD3  -Prior Cr 1.15 (2017) and 1.55 (2022)  -Unclear if we are dealing with PREM on CKD3, vs if this is the new baseline  -Hold Metformin  -Started IVF as pt appears dry  -F/U BMP     #Anemia of chronic disease  -Prior Hb 8-9  -Downtrending this am   -Continue to monitor  -F/U FOBT  -F/U iron studies  -Iron supplement daily    #DVT ppx: Eliquis    Case d/w patient's PMD, Dr. Merchant, [phone], who is involved in her care and would appreciate any updates  4/8 Case d/w patient's son, Martin Santana, [phone]    FULL CODE at this time 89F (Gujarati-speaking) with HTN, asthma, DM2, hypothyroidism, recent hospitalization in [place] for "trouble breathing", returned from [place] 5 days ago, comes to the ED with SOB, diagnosed with asthma exacerbation secondary to RSV    #Asthma exacerbation  #RSV infection  -Patient satting 97% RA  -CT chest non-contrast shows trace bilateral pleural effusions   -Continue PO steroids for now (got IV in the ED).... taper schedule to be determined  -Albuterol nebs q6h for now  -d-dimer 506,  f/u doppler US B/L LE, consider V/Q scan as Cr elevated (patient recently on plane ride from [place])  -F/U blood cultures    #New A-fib  -Will start Eliquis (will stop ASA for now, no hx of CAD or stroke, risk of bleeding on ASA + Eliquis > benefits)  -Cardio consult  -F/U Echo  -troponin negative   -d-dimer positive, would consider V/Q... although will start with leg dopplers and echo to assess for RV strain - currently on A/C    #Essential HTN  -c/w Norvasc, Clonidine    #Hypothyroidism  -c/w Synthroid  -TSH reviewed wnl    #Hyperkalemia  -f/u repeat BMP, no EKG changes  -Ordered 1 dose of kayexalate     #Hyponatremia  -Monitor BMP    #Bilateral leg swelling  -improved this am  -F/U US to r/o DVT (recent plane ride)  -Could be from low albumen state  -does not appear to be in overt heart failure at this time despite elevated pro-BNP    #HLD  -c/w statin    #DM2  -hold oral meds (Metformin, Glimeperide)  -start ISS  -POCT glucose testing  -F/U A1C    #PREM vs CKD3  -Prior Cr 1.15 (2017) and 1.55 (2022)  -Unclear if we are dealing with PREM on CKD3, vs if this is the new baseline  -Hold Metformin  -Started IVF as pt appears dry  -F/U BMP     #Anemia of chronic disease  -Prior Hb 8-9  -Downtrending this am   -Continue to monitor  -F/U FOBT  -F/U iron studies  -Iron supplement daily    #DVT ppx: Eliquis    Case d/w patient's PMD, Dr. Merchant, [phone], who is involved in her care and would appreciate any updates  4/8 Case d/w patient's son, Martin Santana, [phone]    FULL CODE at this time 89F (Gujarati-speaking) with HTN, asthma, DM2, hypothyroidism, recent hospitalization in [place] for "trouble breathing", returned from [place] 5 days ago, comes to the ED with SOB, diagnosed with asthma exacerbation secondary to RSV    #Asthma exacerbation  #RSV infection  -Patient satting 97% RA  -CT chest non-contrast shows trace bilateral pleural effusions   -Continue PO steroids for now (got IV in the ED).... taper schedule to be determined  -Albuterol nebs q6h for now  -d-dimer 506, consider V/Q scan as Cr elevated (patient recently on plane ride from [place])   -doppler US B/L LE negative for DVT  -F/U blood cultures    #New A-fib  -Will start Eliquis (will stop ASA for now, no hx of CAD or stroke, risk of bleeding on ASA + Eliquis > benefits)  -Cardio consult  -Echo: LVEF 55-60%, moderate to severe aortic stenosis, grade 3 diastolic dysfunction, severe pulm htn  -troponin negative   -d-dimer positive, would consider V/Q... although will start with leg dopplers and echo to assess for RV strain, which appear negative - currently on A/C    #Essential HTN  -c/w Norvasc, Clonidine    #Hypothyroidism  -c/w Synthroid  -TSH reviewed wnl    #Hyperkalemia  -f/u repeat BMP, no EKG changes  -Ordered 1 dose of kayexalate     #Hyponatremia  -Monitor BMP    #Bilateral leg swelling  -improved this am  -US negative DVT (recent plane ride)  -Could be from low albumen state  -does not appear to be in overt heart failure at this time despite elevated pro-BNP    #HLD  -c/w statin    #DM2  -hold oral meds (Metformin, Glimeperide)  -start ISS  -POCT glucose testing  -F/U A1C    #PREM vs CKD3  -Prior Cr 1.15 (2017) and 1.55 (2022)  -Unclear if we are dealing with PREM on CKD3, vs if this is the new baseline  -Hold Metformin  -Started IVF as pt appears dry  -F/U BMP     #Anemia of chronic disease  -Prior Hb 8-9  -Downtrending this am   -Continue to monitor  -F/U FOBT  -F/U iron studies  -Iron supplement daily    #DVT ppx: Eliquis    Case d/w patient's PMD, Dr. Merchant, [phone], who is involved in her care and would appreciate any updates  4/8 Case d/w patient's son, Martin Santana, [phone]    FULL CODE at this time 89F (Gujarati-speaking) with HTN, asthma, DM2, hypothyroidism, recent hospitalization in [place] for "trouble breathing", returned from [place] 5 days ago, comes to the ED with SOB, diagnosed with asthma exacerbation secondary to RSV    #Asthma exacerbation  #RSV infection  -Patient satting 97% RA  -CT chest non-contrast shows trace bilateral pleural effusions   -Continue PO steroids for now (got IV in the ED).... taper schedule to be determined  -Albuterol nebs q6h for now  -d-dimer 506, consider V/Q scan as Cr elevated (patient recently on plane ride from [place])   -doppler US B/L LE negative for DVT  -F/U blood cultures    #New A-fib  -Will start Eliquis (will stop ASA for now, no hx of CAD or stroke, risk of bleeding on ASA + Eliquis > benefits)  -Cardio consult  -Echo: LVEF 55-60%, moderate to severe aortic stenosis, grade 3 diastolic dysfunction, severe pulm htn  -troponin negative   -d-dimer positive, would consider V/Q... although will start with leg dopplers and echo to assess for RV strain, which appear negative - currently on A/C    #Essential HTN  -c/w Norvasc, Clonidine    #Hypothyroidism  -c/w Synthroid  -TSH reviewed wnl    #Hyperkalemia  -f/u repeat BMP, no EKG changes  -Ordered 1 dose of kayexalate     #Hyponatremia  -Monitor BMP    #Bilateral leg swelling  -improved this am  -US negative DVT (recent plane ride)  -Could be from low albumen state  -does not appear to be in overt heart failure at this time despite elevated pro-BNP    #HLD  -c/w statin    #DM2  -hold oral meds (Metformin, Glimeperide)  -start ISS  -POCT glucose testing  -F/U A1C    #PREM vs CKD3  -Prior Cr 1.15 (2017) and 1.55 (2022)  -Unclear if we are dealing with PREM on CKD3, vs if this is the new baseline  -Hold Metformin  -Started IVF as pt appears dry  -F/U BMP     #Anemia of chronic disease  -Prior Hb 8-9  -Downtrending this am   -Continue to monitor  -F/U FOBT  -F/U iron studies  -Iron supplement daily    #DVT ppx: Eliquis    Case d/w patient's PMD, Dr. Merchant, [phone], who is involved in her care and would appreciate any updates  4/8 Case d/w patient's son, Martin Santana, [phone]    FULL CODE at this time 89F (Gujarati-speaking) with HTN, asthma, DM2, hypothyroidism, recent hospitalization in [place] for "trouble breathing", returned from [place] 5 days ago, comes to the ED with SOB, diagnosed with asthma exacerbation secondary to RSV    #Asthma exacerbation  #RSV infection  -Patient satting 97% RA  -CT chest non-contrast shows trace bilateral pleural effusions   -Continue PO steroids for now (got IV in the ED).... taper schedule to be determined  -Albuterol nebs q6h for now  -d-dimer 506, consider V/Q scan as Cr elevated (patient recently on plane ride from [place])   -doppler US B/L LE negative for DVT  -F/U blood cultures    #New A-fib  -Will start Eliquis (will stop ASA for now, no hx of CAD or stroke, risk of bleeding on ASA + Eliquis > benefits)  -Cardio consult  -Echo: LVEF 55-60%, moderate to severe aortic stenosis, grade 3 diastolic dysfunction, severe pulm htn  -troponin negative   -d-dimer positive, would consider V/Q... although will start with leg dopplers and echo to assess for RV strain, which appear negative - currently on A/C    #Essential HTN  -c/w Norvasc, Clonidine    #Hypothyroidism  -c/w Synthroid  -TSH reviewed wnl    #Hyperkalemia  -f/u repeat BMP, no EKG changes  -Ordered 1 dose of kayexalate     #Hyponatremia  -Monitor BMP    #Bilateral leg swelling  -improved this am  -US negative DVT (recent plane ride)  -Could be from low albumen state  -does not appear to be in overt heart failure at this time despite elevated pro-BNP    #HLD  -c/w statin    #DM2  -hold oral meds (Metformin, Glimeperide)  -start ISS  -POCT glucose testing  -F/U A1C    #PREM vs CKD3  -Prior Cr 1.15 (2017) and 1.55 (2022)  -Unclear if we are dealing with PREM on CKD3, vs if this is the new baseline  -Hold Metformin  -Started IVF as pt appears dry  -F/U BMP     #Anemia of chronic disease  -Prior Hb 8-9  -Downtrending this am   -Continue to monitor  -F/U FOBT  -F/U iron studies  -Iron supplement daily    #DVT ppx: Eliquis    Case d/w patient's PMD, Dr. Merchant, [phone], who is involved in her care and would appreciate any updates  4/8 Case d/w patient's son, Martin Santana, [phone]    FULL CODE at this time

## 2023-04-09 LAB
ANION GAP SERPL CALC-SCNC: 6 MMOL/L — SIGNIFICANT CHANGE UP (ref 5–17)
BLD GP AB SCN SERPL QL: SIGNIFICANT CHANGE UP
BUN SERPL-MCNC: 43 MG/DL — HIGH (ref 7–23)
CALCIUM SERPL-MCNC: 8.5 MG/DL — SIGNIFICANT CHANGE UP (ref 8.4–10.5)
CALCIUM SERPL-MCNC: 8.6 MG/DL — SIGNIFICANT CHANGE UP (ref 8.4–10.5)
CHLORIDE SERPL-SCNC: 97 MMOL/L — SIGNIFICANT CHANGE UP (ref 96–108)
CO2 SERPL-SCNC: 31 MMOL/L — SIGNIFICANT CHANGE UP (ref 22–31)
CO2 SERPL-SCNC: 32 MMOL/L — HIGH (ref 22–31)
CREAT SERPL-MCNC: 1.94 MG/DL — HIGH (ref 0.5–1.3)
CREAT SERPL-MCNC: 2.01 MG/DL — HIGH (ref 0.5–1.3)
CULTURE RESULTS: SIGNIFICANT CHANGE UP
EGFR: 23 ML/MIN/1.73M2 — LOW
EGFR: 24 ML/MIN/1.73M2 — LOW
FERRITIN SERPL-MCNC: 77 NG/ML — SIGNIFICANT CHANGE UP (ref 15–150)
FOLATE SERPL-MCNC: 2.1 NG/ML — LOW
GLUCOSE BLDC GLUCOMTR-MCNC: 153 MG/DL — HIGH (ref 70–99)
GLUCOSE BLDC GLUCOMTR-MCNC: 215 MG/DL — HIGH (ref 70–99)
GLUCOSE BLDC GLUCOMTR-MCNC: 318 MG/DL — HIGH (ref 70–99)
GLUCOSE BLDC GLUCOMTR-MCNC: 341 MG/DL — HIGH (ref 70–99)
GLUCOSE SERPL-MCNC: 116 MG/DL — HIGH (ref 70–99)
GLUCOSE SERPL-MCNC: 174 MG/DL — HIGH (ref 70–99)
HCT VFR BLD CALC: 20.8 % — CRITICAL LOW (ref 34.5–45)
HCT VFR BLD CALC: 23.8 % — LOW (ref 34.5–45)
HGB BLD-MCNC: 6.6 G/DL — CRITICAL LOW (ref 11.5–15.5)
HGB BLD-MCNC: 7.3 G/DL — LOW (ref 11.5–15.5)
IRON SATN MFR SERPL: 10 % — LOW (ref 14–50)
IRON SATN MFR SERPL: 22 UG/DL — LOW (ref 30–160)
MCHC RBC-ENTMCNC: 20.1 PG — LOW (ref 27–34)
MCHC RBC-ENTMCNC: 20.4 PG — LOW (ref 27–34)
MCHC RBC-ENTMCNC: 30.7 GM/DL — LOW (ref 32–36)
MCHC RBC-ENTMCNC: 31.7 GM/DL — LOW (ref 32–36)
MCV RBC AUTO: 64.2 FL — LOW (ref 80–100)
MCV RBC AUTO: 65.4 FL — LOW (ref 80–100)
NRBC # BLD: 0 /100 WBCS — SIGNIFICANT CHANGE UP (ref 0–0)
OB PNL STL: POSITIVE
PLATELET # BLD AUTO: 252 K/UL — SIGNIFICANT CHANGE UP (ref 150–400)
PLATELET # BLD AUTO: 286 K/UL — SIGNIFICANT CHANGE UP (ref 150–400)
POTASSIUM SERPL-MCNC: 4.4 MMOL/L — SIGNIFICANT CHANGE UP (ref 3.5–5.3)
POTASSIUM SERPL-MCNC: 5 MMOL/L — SIGNIFICANT CHANGE UP (ref 3.5–5.3)
POTASSIUM SERPL-SCNC: 4.4 MMOL/L — SIGNIFICANT CHANGE UP (ref 3.5–5.3)
POTASSIUM SERPL-SCNC: 5 MMOL/L — SIGNIFICANT CHANGE UP (ref 3.5–5.3)
RBC # BLD: 3.24 M/UL — LOW (ref 3.8–5.2)
RBC # BLD: 3.64 M/UL — LOW (ref 3.8–5.2)
RBC # FLD: 17.2 % — HIGH (ref 10.3–14.5)
SODIUM SERPL-SCNC: 134 MMOL/L — LOW (ref 135–145)
SODIUM SERPL-SCNC: 135 MMOL/L — SIGNIFICANT CHANGE UP (ref 135–145)
SPECIMEN SOURCE: SIGNIFICANT CHANGE UP
TIBC SERPL-MCNC: 208 UG/DL — LOW (ref 220–430)
UIBC SERPL-MCNC: 186 UG/DL — SIGNIFICANT CHANGE UP (ref 110–370)
VIT B12 SERPL-MCNC: 635 PG/ML — SIGNIFICANT CHANGE UP (ref 232–1245)
WBC # BLD: 6.94 K/UL — SIGNIFICANT CHANGE UP (ref 3.8–10.5)
WBC # BLD: 8.28 K/UL — SIGNIFICANT CHANGE UP (ref 3.8–10.5)
WBC # FLD AUTO: 6.94 K/UL — SIGNIFICANT CHANGE UP (ref 3.8–10.5)
WBC # FLD AUTO: 8.28 K/UL — SIGNIFICANT CHANGE UP (ref 3.8–10.5)

## 2023-04-09 PROCEDURE — 99232 SBSQ HOSP IP/OBS MODERATE 35: CPT

## 2023-04-09 RX ORDER — FOLIC ACID 0.8 MG
1 TABLET ORAL DAILY
Refills: 0 | Status: DISCONTINUED | OUTPATIENT
Start: 2023-04-09 | End: 2023-04-15

## 2023-04-09 RX ADMIN — ALBUTEROL 2.5 MILLIGRAM(S): 90 AEROSOL, METERED ORAL at 03:57

## 2023-04-09 RX ADMIN — ALBUTEROL 2.5 MILLIGRAM(S): 90 AEROSOL, METERED ORAL at 15:15

## 2023-04-09 RX ADMIN — Medication 1 MILLIGRAM(S): at 12:17

## 2023-04-09 RX ADMIN — Medication 8: at 12:17

## 2023-04-09 RX ADMIN — Medication 2: at 07:45

## 2023-04-09 RX ADMIN — Medication 325 MILLIGRAM(S): at 12:17

## 2023-04-09 RX ADMIN — ALBUTEROL 2.5 MILLIGRAM(S): 90 AEROSOL, METERED ORAL at 21:25

## 2023-04-09 RX ADMIN — ATORVASTATIN CALCIUM 20 MILLIGRAM(S): 80 TABLET, FILM COATED ORAL at 21:33

## 2023-04-09 RX ADMIN — Medication 0.2 MILLIGRAM(S): at 17:03

## 2023-04-09 RX ADMIN — Medication 0.2 MILLIGRAM(S): at 05:23

## 2023-04-09 RX ADMIN — ALBUTEROL 2.5 MILLIGRAM(S): 90 AEROSOL, METERED ORAL at 09:20

## 2023-04-09 RX ADMIN — APIXABAN 2.5 MILLIGRAM(S): 2.5 TABLET, FILM COATED ORAL at 05:24

## 2023-04-09 RX ADMIN — Medication 25 MICROGRAM(S): at 05:24

## 2023-04-09 RX ADMIN — Medication 8: at 16:38

## 2023-04-09 RX ADMIN — Medication 40 MILLIGRAM(S): at 05:25

## 2023-04-09 RX ADMIN — AMLODIPINE BESYLATE 5 MILLIGRAM(S): 2.5 TABLET ORAL at 05:24

## 2023-04-09 NOTE — PROGRESS NOTE ADULT - ASSESSMENT
89F (Gujarati-speaking) with HTN, asthma, DM2, hypothyroidism, recent hospitalization in [place] for "trouble breathing", returned from [place] 5 days ago, comes to the ED with SOB, diagnosed with asthma exacerbation secondary to RSV    #Asthma exacerbation  #RSV infection  -Patient satting 97% RA  -CT chest non-contrast shows trace bilateral pleural effusions   -Continue PO steroids for now (got IV in the ED).... taper schedule to be determined  -Albuterol nebs q6h for now  -d-dimer 506, doppler US B/L LE negative for DVT  -considered V/Q scan (Cr elevated) as pt had tachycardia and was recently on plane ride from [place], but EKG and echo not significant for RV strain, doppler LE negative for DVT, tachycardia resolved as asthma exacerbation improving  -F/U blood cultures    #New A-fib  -Was started on Eliquis, but with worsening anemia, FOBT +,  now holding eliquis (will stop ASA for now, no hx of CAD or stroke, risk of bleeding on ASA + Eliquis > benefits)  -Cardio consult  -Echo: LVEF 55-60%, moderate to severe aortic stenosis, grade 3 diastolic dysfunction, severe pulm htn  -troponin negative   -d-dimer positive, would consider V/Q as pt had tachycardia and was recently on plane ride from [place], but EKG and echo not significant for RV strain, doppler LE negative for DVT, tachycardia resolved as asthma exacerbation improving    #Essential HTN  -c/w Norvasc, Clonidine    #Hypothyroidism  -c/w Synthroid  -TSH reviewed wnl    #Hyperkalemia-resolved  -f/u repeat BMP, no EKG changes  -Patient received 2 doses of Kayexalate     #Hyponatremia-resolved  -Monitor BMP    #Bilateral leg swelling  -improved  -US negative DVT (recent plane ride)  -Could be from low albumen state  -does not appear to be in overt heart failure at this time despite elevated pro-BNP    #HLD  -c/w statin    #DM2  -hold oral meds (Metformin, Glimeperide)  -start ISS  -POCT glucose testing  -F/U A1C    #PREM vs CKD3  -Prior Cr 1.15 (2017) and 1.55 (2022)  -Unclear if we are dealing with PREM on CKD3, vs if this is the new baseline  -Hold Metformin  -Started IVF as pt appears dry  -F/U BMP     #Anemia of chronic disease  #Positive FOBT  -Prior Hb 8-9  -Downtrending this am, patient appears asymptomatic at this time  - Hold anticoagulation   -Continue to monitor  -FOBT positive   -Low iron and folate, start supplements  -Will discuss with family if patient has hx of gi bleed or recent colonoscopy, discuss goc      #DVT ppx: Eliquis    Case d/w patient's PMD, Dr. Merchant, [phone], who is involved in her care and would appreciate any updates  4/9 Called patient's son, Martin Santana, [phone], no answer will try again later.     FULL CODE at this time 89F (Gujarati-speaking) with HTN, asthma, DM2, hypothyroidism, recent hospitalization in [place] for "trouble breathing", returned from [place] 5 days ago, comes to the ED with SOB, diagnosed with asthma exacerbation secondary to RSV    #Asthma exacerbation  #RSV infection  -Patient satting 97% RA  -CT chest non-contrast shows trace bilateral pleural effusions   -Continue PO steroids for now (got IV in the ED).... taper schedule to be determined  -Albuterol nebs q6h for now  -d-dimer 506, doppler US B/L LE negative for DVT  -considered V/Q scan (Cr elevated) as pt had tachycardia and was recently on plane ride from [place], but EKG and echo not significant for RV strain, doppler LE negative for DVT, tachycardia resolved as asthma exacerbation improving  -F/U blood cultures    #New A-fib  -Was started on Eliquis, but with worsening anemia, FOBT +,  now holding eliquis (will stop ASA for now, no hx of CAD or stroke, risk of bleeding on ASA + Eliquis > benefits)  -Cardio consult  -Echo: LVEF 55-60%, moderate to severe aortic stenosis, grade 3 diastolic dysfunction, severe pulm htn  -troponin negative   -d-dimer positive, would consider V/Q as pt had tachycardia and was recently on plane ride from [place], but EKG and echo not significant for RV strain, doppler LE negative for DVT, tachycardia resolved as asthma exacerbation improving    #Essential HTN  -c/w Norvasc, Clonidine    #Hypothyroidism  -c/w Synthroid  -TSH reviewed wnl    #Hyperkalemia-resolved  -f/u repeat BMP, no EKG changes  -Patient received 2 doses of Kayexalate     #Hyponatremia-resolved  -Monitor BMP    #Bilateral leg swelling  -improved  -US negative DVT (recent plane ride)  -Could be from low albumen state  -does not appear to be in overt heart failure at this time despite elevated pro-BNP    #HLD  -c/w statin    #DM2  -hold oral meds (Metformin, Glimeperide)  -start ISS  -POCT glucose testing  -F/U A1C    #PREM vs CKD3  -Prior Cr 1.15 (2017) and 1.55 (2022)  -Unclear if we are dealing with PREM on CKD3, vs if this is the new baseline  -Hold Metformin  -Started IVF as pt appears dry  -F/U BMP     #Anemia of chronic disease  #Positive FOBT  -Prior Hb 8-9  -Downtrending this am, patient appears asymptomatic at this time  - Hold anticoagulation   -Continue to monitor  -FOBT positive   -Low iron and folate, start supplements  -Will discuss with family if patient has hx of gi bleed or recent colonoscopy, discuss goc      #DVT ppx: Eliquis    Case d/w patient's PMD, Dr. Merchant, [phone], who is involved in her care and would appreciate any updates  4/9 Called patient's son, Martin Santana, [phone], no answer will try again later.     FULL CODE at this time 89F (Gujarati-speaking) with HTN, asthma, DM2, hypothyroidism, recent hospitalization in [place] for "trouble breathing", returned from [place] 5 days ago, comes to the ED with SOB, diagnosed with asthma exacerbation secondary to RSV    #Asthma exacerbation  #RSV infection  -Patient satting 97% RA  -CT chest non-contrast shows trace bilateral pleural effusions   -Continue PO steroids for now (got IV in the ED).... taper schedule to be determined  -Albuterol nebs q6h for now  -d-dimer 506, doppler US B/L LE negative for DVT  -considered V/Q scan (Cr elevated) as pt had tachycardia and was recently on plane ride from [place], but EKG and echo not significant for RV strain, doppler LE negative for DVT, tachycardia resolved as asthma exacerbation improving  -F/U blood cultures    #New A-fib  -Was started on Eliquis, but with worsening anemia, FOBT +,  now holding eliquis (will stop ASA for now, no hx of CAD or stroke, risk of bleeding on ASA + Eliquis > benefits)  -Cardio consult  -Echo: LVEF 55-60%, moderate to severe aortic stenosis, grade 3 diastolic dysfunction, severe pulm htn  -troponin negative   -d-dimer positive, would consider V/Q as pt had tachycardia and was recently on plane ride from [place], but EKG and echo not significant for RV strain, doppler LE negative for DVT, tachycardia resolved as asthma exacerbation improving    #Essential HTN  -c/w Norvasc, Clonidine    #Hypothyroidism  -c/w Synthroid  -TSH reviewed wnl    #Hyperkalemia-resolved  -f/u repeat BMP, no EKG changes  -Patient received 2 doses of Kayexalate     #Hyponatremia-resolved  -Monitor BMP    #Bilateral leg swelling  -improved  -US negative DVT (recent plane ride)  -Could be from low albumen state  -does not appear to be in overt heart failure at this time despite elevated pro-BNP    #HLD  -c/w statin    #DM2  -hold oral meds (Metformin, Glimeperide)  -start ISS  -POCT glucose testing  -F/U A1C    #PREM vs CKD3  -Prior Cr 1.15 (2017) and 1.55 (2022)  -Unclear if we are dealing with PREM on CKD3, vs if this is the new baseline  -Hold Metformin  -Started IVF as pt appears dry  -F/U BMP     #Anemia of chronic disease  #Positive FOBT  -Prior Hb 8-9  -Downtrending this am, patient appears asymptomatic at this time  - Hold anticoagulation   -Continue to monitor  -FOBT positive   -Low iron and folate, start supplements  -Will discuss with family if patient has hx of gi bleed or recent colonoscopy, discuss goc      #DVT ppx: Eliquis    Case d/w patient's PMD, Dr. Merchant, [phone], who is involved in her care and would appreciate any updates  4/9 Called patient's son, Mratin Santana, [phone], no answer will try again later.     FULL CODE at this time 89F (Gujarati-speaking) with HTN, asthma, DM2, hypothyroidism, recent hospitalization in [place] for "trouble breathing", returned from [place] 5 days ago, comes to the ED with SOB, diagnosed with asthma exacerbation secondary to RSV    #Asthma exacerbation  #RSV infection  -Patient satting 97% RA  -CT chest non-contrast shows trace bilateral pleural effusions   -Continue PO steroids for now (got IV in the ED).... taper schedule to be determined  -Albuterol nebs q6h for now  -d-dimer 506, doppler US B/L LE negative for DVT  -considered V/Q scan (Cr elevated) as pt had tachycardia and was recently on plane ride from [place], but EKG and echo not significant for RV strain, doppler LE negative for DVT, tachycardia resolved as asthma exacerbation improving  -F/U blood cultures    #New A-fib  -Was started on Eliquis, but with worsening anemia, FOBT +, now holding eliquis (will stop ASA for now, no hx of CAD or stroke, risk of bleeding on ASA + Eliquis > benefits)  -Cardio consult  -Echo: LVEF 55-60%, moderate to severe aortic stenosis, grade 3 diastolic dysfunction, severe pulm htn  -troponin negative   -d-dimer positive, would consider V/Q as pt had tachycardia and was recently on plane ride from [place], but EKG and echo not significant for RV strain, doppler LE negative for DVT, tachycardia resolved as asthma exacerbation improving    #Essential HTN  -c/w Norvasc, Clonidine    #Hypothyroidism  -c/w Synthroid  -TSH reviewed wnl    #Hyperkalemia-resolved  -f/u repeat BMP, no EKG changes  -Patient received 2 doses of Kayexalate     #Hyponatremia-resolved  -Monitor BMP    #Bilateral leg swelling  -improved  -US negative DVT (recent plane ride)  -Could be from low albumen state  -does not appear to be in overt heart failure at this time despite elevated pro-BNP    #HLD  -c/w statin    #DM2  -hold oral meds (Metformin, Glimeperide)  -start ISS  -POCT glucose testing  -F/U A1C    #PREM vs CKD3  -Prior Cr 1.15 (2017) and 1.55 (2022)  -Unclear if we are dealing with PREM on CKD3, vs if this is the new baseline  -Hold Metformin  -Started IVF as pt appears dry  -F/U BMP     #Anemia of chronic disease  #Positive FOBT  -Prior Hb 8-9  -Downtrending this am, patient appears asymptomatic at this time  - Hold anticoagulation   -Continue to monitor  -FOBT positive   -Low iron and folate, start supplements  -Per family, patient has never had a colonoscopy, but has normally low H/H levels. Will reach out to PCP Dr. Merchant to acquire more information  -At this time family would like to pursue whatever measures are necessary to help her, understand risks of colonoscopy with older age    #DVT ppx: Eliquis    Case d/w patient's PMD, Dr. Merchant, [phone], who is involved in her care and would appreciate any updates  4/9 Discussed case with Martin Santana, [phone]    FULL CODE at this time 89F (Gujarati-speaking) with HTN, asthma, DM2, hypothyroidism, recent hospitalization in [place] for "trouble breathing", returned from [place] 5 days ago, comes to the ED with SOB, diagnosed with asthma exacerbation secondary to RSV    #Asthma exacerbation  #RSV infection  -Patient satting 97% RA  -CT chest non-contrast shows trace bilateral pleural effusions   -Continue PO steroids for now (got IV in the ED).... taper schedule to be determined  -Albuterol nebs q6h for now  -d-dimer 506, doppler US B/L LE negative for DVT  -considered V/Q scan (Cr elevated) as pt had tachycardia and was recently on plane ride from [place], but EKG and echo not significant for RV strain, doppler LE negative for DVT, tachycardia resolved as asthma exacerbation improving  -F/U blood cultures    #New A-fib  -Was started on Eliquis, but with worsening anemia, FOBT +, now holding eliquis (will stop ASA for now, no hx of CAD or stroke, risk of bleeding on ASA + Eliquis > benefits)  -Cardio consult  -Echo: LVEF 55-60%, moderate to severe aortic stenosis, grade 3 diastolic dysfunction, severe pulm htn  -troponin negative   -d-dimer positive, would consider V/Q as pt had tachycardia and was recently on plane ride from [place], but EKG and echo not significant for RV strain, doppler LE negative for DVT, tachycardia resolved as asthma exacerbation improving    #Essential HTN  -c/w Norvasc, Clonidine    #Hypothyroidism  -c/w Synthroid  -TSH reviewed wnl    #Hyperkalemia-resolved  -f/u repeat BMP, no EKG changes  -Patient received 2 doses of Kayexalate     #Hyponatremia-resolved  -Monitor BMP    #Bilateral leg swelling  -improved  -US negative DVT (recent plane ride)  -Could be from low albumen state  -does not appear to be in overt heart failure at this time despite elevated pro-BNP    #HLD  -c/w statin    #DM2  -hold oral meds (Metformin, Glimeperide)  -start ISS  -POCT glucose testing  -F/U A1C    #PREM vs CKD3  -Prior Cr 1.15 (2017) and 1.55 (2022)  -Unclear if we are dealing with PREM on CKD3, vs if this is the new baseline  -Hold Metformin  -Started IVF as pt appears dry  -F/U BMP     #Anemia of chronic disease  #Positive FOBT  -Prior Hb 8-9  -Downtrending this am, patient appears asymptomatic at this time  - Hold anticoagulation   -Continue to monitor  -FOBT positive   -Low iron and folate, start supplements  -Per family, patient has never had a colonoscopy, but has normally low H/H levels. Will reach out to PCP Dr. Merchant to acquire more information  -At this time family would like to pursue whatever measures are necessary to help her, understand risks of colonoscopy with older age    #DVT ppx: Eliquis    Case d/w patient's PMD, Dr. Merchant, [phone], who is involved in her care and would appreciate any updates  4/9 Discussed case with Martin Santana, [phone]    FULL CODE at this time 89F (Gujarati-speaking) with HTN, asthma, DM2, hypothyroidism, recent hospitalization in [place] for "trouble breathing", returned from [place] 5 days ago, comes to the ED with SOB, diagnosed with asthma exacerbation secondary to RSV    #Asthma exacerbation  #RSV infection  -Patient satting 97% RA  -CT chest non-contrast shows trace bilateral pleural effusions   -Continue PO steroids for now (got IV in the ED).... taper schedule to be determined  -Albuterol nebs q6h for now  -d-dimer 506, doppler US B/L LE negative for DVT  -considered V/Q scan (Cr elevated) as pt had tachycardia and was recently on plane ride from [place], but EKG and echo not significant for RV strain, doppler LE negative for DVT, tachycardia resolved as asthma exacerbation improving  -F/U blood cultures    #New A-fib  -Was started on Eliquis, but with worsening anemia, FOBT +, now holding eliquis (will stop ASA for now, no hx of CAD or stroke, risk of bleeding on ASA + Eliquis > benefits)  -Cardio consult  -Echo: LVEF 55-60%, moderate to severe aortic stenosis, grade 3 diastolic dysfunction, severe pulm htn  -troponin negative   -d-dimer positive, would consider V/Q as pt had tachycardia and was recently on plane ride from [place], but EKG and echo not significant for RV strain, doppler LE negative for DVT, tachycardia resolved as asthma exacerbation improving    #Essential HTN  -c/w Norvasc, Clonidine    #Hypothyroidism  -c/w Synthroid  -TSH reviewed wnl    #Hyperkalemia-resolved  -f/u repeat BMP, no EKG changes  -Patient received 2 doses of Kayexalate     #Hyponatremia-resolved  -Monitor BMP    #Bilateral leg swelling  -improved  -US negative DVT (recent plane ride)  -Could be from low albumen state  -does not appear to be in overt heart failure at this time despite elevated pro-BNP    #HLD  -c/w statin    #DM2  -hold oral meds (Metformin, Glimeperide)  -start ISS  -POCT glucose testing  -F/U A1C    #PREM vs CKD3  -Prior Cr 1.15 (2017) and 1.55 (2022)  -Unclear if we are dealing with PREM on CKD3, vs if this is the new baseline  -Hold Metformin  -Started IVF as pt appears dry  -F/U BMP     #Anemia of chronic disease  #Positive FOBT  -Prior Hb 8-9  -Downtrending this am, patient appears asymptomatic at this time  - Hold anticoagulation   -Continue to monitor  -FOBT positive   -Low iron and folate, start supplements  -Per family, patient has never had a colonoscopy, but has normally low H/H levels. Will reach out to PCP Dr. Merchant to acquire more information  -At this time family would like to pursue whatever measures are necessary to help her, understand risks of colonoscopy with older age    #DVT ppx: Eliquis    Case d/w patient's PMD, Dr. Merchant, [phone], who is involved in her care and would appreciate any updates  4/9 Discussed case with Martin Santana, [phone]    FULL CODE at this time 89F (Gujarati-speaking) with HTN, asthma, DM2, hypothyroidism, recent hospitalization in [place] for "trouble breathing", returned from [place] 5 days ago, comes to the ED with SOB, diagnosed with asthma exacerbation secondary to RSV    #Asthma exacerbation  #RSV infection  -Patient satting 97% RA  -CT chest non-contrast shows trace bilateral pleural effusions   -Continue PO steroids for now (got IV in the ED).... taper schedule to be determined  -Albuterol nebs q6h for now  -d-dimer 506, doppler US B/L LE negative for DVT  -considered V/Q scan (Cr elevated) as pt had tachycardia and was recently on plane ride from [place], but EKG and echo not significant for RV strain, doppler LE negative for DVT, tachycardia resolved as asthma exacerbation improving  -F/U blood cultures    #New A-fib  -Was started on Eliquis, but with worsening anemia, FOBT +, now holding eliquis (will stop ASA for now, no hx of CAD or stroke, risk of bleeding on ASA + Eliquis > benefits)  -Cardio consult  -Echo: LVEF 55-60%, moderate to severe aortic stenosis, grade 3 diastolic dysfunction, severe pulm htn  -troponin negative   -d-dimer positive, would consider V/Q as pt had tachycardia and was recently on plane ride from [place], but EKG and echo not significant for RV strain, doppler LE negative for DVT, tachycardia resolved as asthma exacerbation improving    #Essential HTN  -c/w Norvasc, Clonidine    #Hypothyroidism  -c/w Synthroid  -TSH reviewed wnl    #Hyperkalemia-resolved  -f/u repeat BMP, no EKG changes  -Patient received 2 doses of Kayexalate     #Hyponatremia-resolved  -Monitor BMP    #Bilateral leg swelling  -improved  -US negative DVT (recent plane ride)  -Could be from low albumen state  -does not appear to be in overt heart failure at this time despite elevated pro-BNP    #HLD  -c/w statin    #DM2  -hold oral meds (Metformin, Glimeperide)  -start ISS  -POCT glucose testing  -F/U A1C    #PREM vs CKD3  -Prior Cr 1.15 (2017) and 1.55 (2022)  -Unclear if we are dealing with PREM on CKD3, vs if this is the new baseline  -Hold Metformin  -Started IVF as pt appears dry  -F/U BMP     #Anemia of chronic disease  #Positive FOBT  -Prior Hb 8-9  -Downtrending this am, patient appears asymptomatic at this time  - Hold anticoagulation   -Continue to monitor  -FOBT positive   -Low iron and folate, start supplements  -Per family, patient has never had a colonoscopy, but has normally low H/H levels. Will reach out to PCP Dr. Merchant to acquire more information  -At this time family would like to pursue whatever measures are necessary to help her, understand risks of colonoscopy with older age    #DVT ppx: patient anemic, positive FOBT, dropping H/H holding anticoagulation     Case d/w patient's PMD, Dr. Merchant, [phone], who is involved in her care and would appreciate any updates  4/9 Discussed case with Martin Santana, [phone]    FULL CODE at this time 89F (Gujarati-speaking) with HTN, asthma, DM2, hypothyroidism, recent hospitalization in [place] for "trouble breathing", returned from [place] 5 days ago, comes to the ED with SOB, diagnosed with asthma exacerbation secondary to RSV    #Asthma exacerbation  #RSV infection  -Patient satting 97% RA  -CT chest non-contrast shows trace bilateral pleural effusions   -Continue PO steroids for now (got IV in the ED).... taper schedule to be determined  -Albuterol nebs q6h for now  -d-dimer 506, doppler US B/L LE negative for DVT  -considered V/Q scan (Cr elevated) as pt had tachycardia and was recently on plane ride from [place], but EKG and echo not significant for RV strain, doppler LE negative for DVT, tachycardia resolved as asthma exacerbation improving  -F/U blood cultures    #New A-fib  -Was started on Eliquis, but with worsening anemia, FOBT +, now holding eliquis (will stop ASA for now, no hx of CAD or stroke, risk of bleeding on ASA + Eliquis > benefits)  -Cardio consult  -Echo: LVEF 55-60%, moderate to severe aortic stenosis, grade 3 diastolic dysfunction, severe pulm htn  -troponin negative   -d-dimer positive, would consider V/Q as pt had tachycardia and was recently on plane ride from [place], but EKG and echo not significant for RV strain, doppler LE negative for DVT, tachycardia resolved as asthma exacerbation improving    #Essential HTN  -c/w Norvasc, Clonidine    #Hypothyroidism  -c/w Synthroid  -TSH reviewed wnl    #Hyperkalemia-resolved  -f/u repeat BMP, no EKG changes  -Patient received 2 doses of Kayexalate     #Hyponatremia-resolved  -Monitor BMP    #Bilateral leg swelling  -improved  -US negative DVT (recent plane ride)  -Could be from low albumen state  -does not appear to be in overt heart failure at this time despite elevated pro-BNP    #HLD  -c/w statin    #DM2  -hold oral meds (Metformin, Glimeperide)  -start ISS  -POCT glucose testing  -F/U A1C    #PREM vs CKD3  -Prior Cr 1.15 (2017) and 1.55 (2022)  -Unclear if we are dealing with PREM on CKD3, vs if this is the new baseline  -Hold Metformin  -Started IVF as pt appears dry  -F/U BMP     #Anemia of chronic disease  #Positive FOBT  -Prior Hb 8-9  -Downtrending this am, patient appears asymptomatic at this time  - Hold anticoagulation   -Continue to monitor  -FOBT positive   -Low iron and folate, start supplements  -Per family, patient has never had a colonoscopy, but has normally low H/H levels. Will reach out to PCP Dr. Merchant to acquire more information  -At this time family would like to pursue whatever measures are necessary to help her, understand risks of colonoscopy with older age    #DVT ppx: patient anemic, positive FOBT, dropping H/H holding anticoagulation     Case d/w patient's PMD, Dr. Merchant, [phone], who is involved in her care and would appreciate any updates  4/9 Discussed case with Martin Santana, [phone]    FULL CODE at this time 89F (Gujarati-speaking) with HTN, asthma, DM2, hypothyroidism, recent hospitalization in [place] for "trouble breathing", returned from [place] 5 days ago, comes to the ED with SOB, diagnosed with asthma exacerbation secondary to RSV    #Asthma exacerbation  #RSV infection  -Patient satting 97% RA  -CT chest non-contrast shows trace bilateral pleural effusions   -Continue PO steroids for now (got IV in the ED).... taper schedule to be determined  -Albuterol nebs q6h for now  -d-dimer 506, doppler US B/L LE negative for DVT  -considered V/Q scan (Cr elevated) as pt had tachycardia and was recently on plane ride from [place], but EKG and echo not significant for RV strain, doppler LE negative for DVT, tachycardia resolved as asthma exacerbation improving  -F/U blood cultures    #New A-fib  -Was started on Eliquis, but with worsening anemia, FOBT +, now holding eliquis (will stop ASA for now, no hx of CAD or stroke, risk of bleeding on ASA + Eliquis > benefits)  -Cardio consult  -Echo: LVEF 55-60%, moderate to severe aortic stenosis, grade 3 diastolic dysfunction, severe pulm htn  -troponin negative   -d-dimer positive, would consider V/Q as pt had tachycardia and was recently on plane ride from [place], but EKG and echo not significant for RV strain, doppler LE negative for DVT, tachycardia resolved as asthma exacerbation improving    #Essential HTN  -c/w Norvasc, Clonidine    #Hypothyroidism  -c/w Synthroid  -TSH reviewed wnl    #Hyperkalemia-resolved  -f/u repeat BMP, no EKG changes  -Patient received 2 doses of Kayexalate     #Hyponatremia-resolved  -Monitor BMP    #Bilateral leg swelling  -improved  -US negative DVT (recent plane ride)  -Could be from low albumen state  -does not appear to be in overt heart failure at this time despite elevated pro-BNP    #HLD  -c/w statin    #DM2  -hold oral meds (Metformin, Glimeperide)  -start ISS  -POCT glucose testing  -F/U A1C    #PREM vs CKD3  -Prior Cr 1.15 (2017) and 1.55 (2022)  -Unclear if we are dealing with PREM on CKD3, vs if this is the new baseline  -Hold Metformin  -Started IVF as pt appears dry  -F/U BMP     #Anemia of chronic disease  #Positive FOBT  -Prior Hb 8-9  -Downtrending this am, patient appears asymptomatic at this time  - Hold anticoagulation   -Continue to monitor  -FOBT positive   -Low iron and folate, start supplements  -Per family, patient has never had a colonoscopy, but has normally low H/H levels. Will reach out to PCP Dr. Merchant to acquire more information  -At this time family would like to pursue whatever measures are necessary to help her, understand risks of colonoscopy with older age    #DVT ppx: patient anemic, positive FOBT, dropping H/H holding anticoagulation     Case d/w patient's PMD, Dr. Merchant, [phone], who is involved in her care and would appreciate any updates  4/9 Discussed case with Martin Santana, [phone]    FULL CODE at this time 89F (Gujarati-speaking) with HTN, asthma, DM2, hypothyroidism, recent hospitalization in [place] for "trouble breathing", returned from [place] 5 days ago, comes to the ED with SOB, diagnosed with asthma exacerbation secondary to RSV    #Asthma exacerbation  #RSV infection  -Patient satting 97% RA  -CT chest non-contrast shows trace bilateral pleural effusions   -Continue PO steroids for now (got IV in the ED).... taper schedule to be determined  -Albuterol nebs q6h for now  -d-dimer 506, doppler US B/L LE negative for DVT  -considered V/Q scan (Cr elevated) as pt had tachycardia and was recently on plane ride from [place], but EKG and echo not significant for RV strain, doppler LE negative for DVT, tachycardia resolved as asthma exacerbation improving  -F/U blood cultures    #New A-fib  -Was started on Eliquis, but with worsening anemia, FOBT +, now holding eliquis (will stop ASA for now, no hx of CAD or stroke, risk of bleeding on ASA + Eliquis > benefits)  -Cardio consult  -Echo: LVEF 55-60%, moderate to severe aortic stenosis, grade 3 diastolic dysfunction, severe pulm htn  -troponin negative   -d-dimer positive, would consider V/Q as pt had tachycardia and was recently on plane ride from [place], but EKG and echo not significant for RV strain, doppler LE negative for DVT, tachycardia resolved as asthma exacerbation improving    #Essential HTN  -c/w Norvasc, Clonidine    #Hypothyroidism  -c/w Synthroid  -TSH reviewed wnl    #Hyperkalemia-resolved  -f/u repeat BMP, no EKG changes  -Patient received 2 doses of Kayexalate     #Hyponatremia-resolved  -Monitor BMP    #Bilateral leg swelling  -improved  -US negative DVT (recent plane ride)  -Could be from low albumen state  -does not appear to be in overt heart failure at this time despite elevated pro-BNP    #HLD  -c/w statin    #DM2  -hold oral meds (Metformin, Glimeperide)  -start ISS  -POCT glucose testing  -F/U A1C    #PREM vs CKD3  -Prior Cr 1.15 (2017) and 1.55 (2022)  -Unclear if we are dealing with PREM on CKD3, vs if this is the new baseline  -Hold Metformin  -Started IVF as pt appears dry  -F/U BMP     #Anemia of chronic disease  #Positive FOBT  -Prior Hb 8-9  -Downtrending this am, patient appears asymptomatic at this time  - Hold anticoagulation   -Continue to monitor, f/u repeat cbc  -FOBT positive   -Low iron and folate, start supplements  -Per family, patient has never had a colonoscopy, but has normally low H/H levels. Per PCP Dr. Merchant patient's hemoglobin normally around 9  -At this time family would like to pursue whatever measures are necessary to help her, understand risks of colonoscopy with older age as well as risks affiliated with blood transfusions   -Blood transfusion form completed and placed in patient's chart  -Consult GI    #DVT ppx: patient anemic, positive FOBT, dropping H/H holding anticoagulation     Case d/w patient's PMD, Dr. Merchant, [phone], who is involved in her care and would appreciate any updates  4/9 Discussed case with Martin Santana, [phone]    FULL CODE at this time 89F (Gujarati-speaking) with HTN, asthma, DM2, hypothyroidism, recent hospitalization in [place] for "trouble breathing", returned from [place] 5 days ago, comes to the ED with SOB, diagnosed with asthma exacerbation secondary to RSV    #Asthma exacerbation  #RSV infection  -Patient satting 97% RA  -CT chest non-contrast shows trace bilateral pleural effusions   -Continue PO steroids for now (got IV in the ED).... taper schedule to be determined  -Albuterol nebs q6h for now  -d-dimer 506, doppler US B/L LE negative for DVT  -considered V/Q scan (Cr elevated) as pt had tachycardia and was recently on plane ride from [place], but EKG and echo not significant for RV strain, doppler LE negative for DVT, tachycardia resolved as asthma exacerbation improving  -F/U blood cultures    #New A-fib  -Was started on Eliquis, but with worsening anemia, FOBT +, now holding eliquis (will stop ASA for now, no hx of CAD or stroke, risk of bleeding on ASA + Eliquis > benefits)  -Cardio consult  -Echo: LVEF 55-60%, moderate to severe aortic stenosis, grade 3 diastolic dysfunction, severe pulm htn  -troponin negative   -d-dimer positive, would consider V/Q as pt had tachycardia and was recently on plane ride from [place], but EKG and echo not significant for RV strain, doppler LE negative for DVT, tachycardia resolved as asthma exacerbation improving    #Essential HTN  -c/w Norvasc, Clonidine    #Hypothyroidism  -c/w Synthroid  -TSH reviewed wnl    #Hyperkalemia-resolved  -f/u repeat BMP, no EKG changes  -Patient received 2 doses of Kayexalate     #Hyponatremia-resolved  -Monitor BMP    #Bilateral leg swelling  -improved  -US negative DVT (recent plane ride)  -Could be from low albumen state  -does not appear to be in overt heart failure at this time despite elevated pro-BNP    #HLD  -c/w statin    #DM2  -hold oral meds (Metformin, Glimeperide)  -start ISS  -POCT glucose testing  -F/U A1C    #PREM vs CKD3  -Prior Cr 1.15 (2017) and 1.55 (2022)  -Unclear if we are dealing with PREM on CKD3, vs if this is the new baseline  -Hold Metformin  -Started IVF as pt appears dry  -F/U BMP     #Anemia of chronic disease  #Positive FOBT  -Prior Hb 8-9  -Downtrending this am, patient appears asymptomatic at this time  - Hold anticoagulation   -Continue to monitor, f/u repeat cbc  -FOBT positive   -Low iron and folate, start supplements  -Per family, patient has never had a colonoscopy, but has normally low H/H levels. Per PCP Dr. Merchant patient's hemoglobin normally around 9  -At this time family would like to pursue whatever measures are necessary to help her, understand risks of colonoscopy with older age as well as risks affiliated with blood transfusions   -Blood transfusion form completed and placed in patient's chart  -Consult GI    #DVT ppx: patient anemic, positive FOBT, dropping H/H holding anticoagulation     Case d/w patient's PMD, Dr. Merchant, [phone], who is involved in her care and would appreciate any updates  4/9 Discussed case with Martin Santana, [phone]    FULL CODE at this time 89F (Gujarati-speaking) with HTN, asthma, DM2, hypothyroidism, recent hospitalization in [place] for "trouble breathing", returned from [place] 5 days ago, comes to the ED with SOB, diagnosed with asthma exacerbation secondary to RSV    #Asthma exacerbation  #RSV infection  -Patient satting 97% RA  -CT chest non-contrast shows trace bilateral pleural effusions   -Continue PO steroids for now (got IV in the ED).... taper schedule to be determined  -Albuterol nebs q6h for now  -d-dimer 506, doppler US B/L LE negative for DVT  -considered V/Q scan (Cr elevated) as pt had tachycardia and was recently on plane ride from [place], but EKG and echo not significant for RV strain, doppler LE negative for DVT, tachycardia resolved as asthma exacerbation improving  -F/U blood cultures    #New A-fib  -Was started on Eliquis, but with worsening anemia, FOBT +, now holding eliquis (will stop ASA for now, no hx of CAD or stroke, risk of bleeding on ASA + Eliquis > benefits)  -Cardio consult  -Echo: LVEF 55-60%, moderate to severe aortic stenosis, grade 3 diastolic dysfunction, severe pulm htn  -troponin negative   -d-dimer positive, would consider V/Q as pt had tachycardia and was recently on plane ride from [place], but EKG and echo not significant for RV strain, doppler LE negative for DVT, tachycardia resolved as asthma exacerbation improving    #Essential HTN  -c/w Norvasc, Clonidine    #Hypothyroidism  -c/w Synthroid  -TSH reviewed wnl    #Hyperkalemia-resolved  -f/u repeat BMP, no EKG changes  -Patient received 2 doses of Kayexalate     #Hyponatremia-resolved  -Monitor BMP    #Bilateral leg swelling  -improved  -US negative DVT (recent plane ride)  -Could be from low albumen state  -does not appear to be in overt heart failure at this time despite elevated pro-BNP    #HLD  -c/w statin    #DM2  -hold oral meds (Metformin, Glimeperide)  -start ISS  -POCT glucose testing  -F/U A1C    #PREM vs CKD3  -Prior Cr 1.15 (2017) and 1.55 (2022)  -Unclear if we are dealing with PREM on CKD3, vs if this is the new baseline  -Hold Metformin  -Started IVF as pt appears dry  -F/U BMP     #Anemia of chronic disease  #Positive FOBT  -Prior Hb 8-9  -Downtrending this am, patient appears asymptomatic at this time  - Hold anticoagulation   -Continue to monitor, f/u repeat cbc  -FOBT positive   -Low iron and folate, start supplements  -Per family, patient has never had a colonoscopy, but has normally low H/H levels. Per PCP Dr. Merchant patient's hemoglobin normally around 9  -At this time family would like to pursue whatever measures are necessary to help her, understand risks of colonoscopy with older age as well as risks affiliated with blood transfusions   -Blood transfusion form completed and placed in patient's chart  -Consult GI    #DVT ppx: patient anemic, positive FOBT, dropping H/H holding anticoagulation     Case d/w patient's PMD, Dr. Merchant, [phone], who is involved in her care and would appreciate any updates  4/9 Discussed case with Martin Santana, [phone]    FULL CODE at this time 89F (Gujarati-speaking) with HTN, asthma, DM2, hypothyroidism, recent hospitalization in [place] for "trouble breathing", returned from [place] 5 days ago, comes to the ED with SOB, diagnosed with asthma exacerbation secondary to RSV    #Asthma exacerbation  #RSV infection  -Patient satting 97% RA  -CT chest non-contrast shows trace bilateral pleural effusions   -Continue PO steroids for now (got IV in the ED).... taper schedule to be determined  -Albuterol nebs q6h for now  -d-dimer 506, doppler US B/L LE negative for DVT  -considered V/Q scan (Cr elevated) as pt had tachycardia and was recently on plane ride from [place], but EKG and echo not significant for RV strain, doppler LE negative for DVT, tachycardia resolved as asthma exacerbation improving  -F/U blood cultures    #New A-fib  -Was started on Eliquis, but with worsening anemia, FOBT +, now holding eliquis (will stop ASA for now, no hx of CAD or stroke, risk of bleeding on ASA + Eliquis > benefits)  -Cardio consult  -Echo: LVEF 55-60%, moderate to severe aortic stenosis, grade 3 diastolic dysfunction, severe pulm htn  -troponin negative   -d-dimer positive, would consider V/Q as pt had tachycardia and was recently on plane ride from [place], but EKG and echo not significant for RV strain, doppler LE negative for DVT, tachycardia resolved as asthma exacerbation improving    #Essential HTN  -c/w Norvasc, Clonidine    #Hypothyroidism  -c/w Synthroid  -TSH reviewed wnl    #Hyperkalemia-resolved  -f/u repeat BMP, no EKG changes  -Patient received 2 doses of Kayexalate     #Hyponatremia-resolved  -Monitor BMP    #Bilateral leg swelling  -improved  -US negative DVT (recent plane ride)  -Could be from low albumen state  -does not appear to be in overt heart failure at this time despite elevated pro-BNP    #HLD  -c/w statin    #DM2  -hold oral meds (Metformin, Glimeperide)  -start ISS  -POCT glucose testing  -F/U A1C    #PREM vs CKD3  -Prior Cr 1.15 (2017) and 1.55 (2022)  -Unclear if we are dealing with PREM on CKD3, vs if this is the new baseline  -Hold Metformin  -Started IVF as pt appears dry  -F/U BMP     #Anemia of chronic disease  #Positive FOBT  -Prior Hb 8-9  -Downtrending this am, patient appears asymptomatic at this time  - Hold anticoagulation   -Continue to monitor, f/u repeat cbc  -FOBT positive   -Low iron and folate, start supplements  -Per family, patient has never had a colonoscopy, but has normally low H/H levels. Per PCP Dr. Merchant patient's hemoglobin normally around 9  -At this time family would like to pursue whatever measures are necessary to help her, understand risks of colonoscopy with older age as well as risks affiliated with blood transfusions   -Blood transfusion form completed and placed in patient's chart  -Consult GI    #DVT ppx: patient anemic, positive FOBT, dropping H/H holding anticoagulation     Case d/w patient's PMD, Dr. Merchant, [phone], who is involved in her care and would appreciate any updates  4/9 Discussed case with patient's son Martin Santana, [phone]    FULL CODE at this time 89F (Gujarati-speaking) with HTN, asthma, DM2, hypothyroidism, recent hospitalization in [place] for "trouble breathing", returned from [place] 5 days ago, comes to the ED with SOB, diagnosed with asthma exacerbation secondary to RSV    #Asthma exacerbation  #RSV infection  -Patient satting 97% RA  -CT chest non-contrast shows trace bilateral pleural effusions   -Continue PO steroids for now (got IV in the ED).... taper schedule to be determined  -Albuterol nebs q6h for now  -d-dimer 506, doppler US B/L LE negative for DVT  -considered V/Q scan (Cr elevated) as pt had tachycardia and was recently on plane ride from [place], but EKG and echo not significant for RV strain, doppler LE negative for DVT, tachycardia resolved as asthma exacerbation improving  -F/U blood cultures    #New A-fib  -Was started on Eliquis, but with worsening anemia, FOBT +, now holding eliquis (will stop ASA for now, no hx of CAD or stroke, risk of bleeding on ASA + Eliquis > benefits)  -Cardio consult  -Echo: LVEF 55-60%, moderate to severe aortic stenosis, grade 3 diastolic dysfunction, severe pulm htn  -troponin negative   -d-dimer positive, would consider V/Q as pt had tachycardia and was recently on plane ride from [place], but EKG and echo not significant for RV strain, doppler LE negative for DVT, tachycardia resolved as asthma exacerbation improving    #Essential HTN  -c/w Norvasc, Clonidine    #Hypothyroidism  -c/w Synthroid  -TSH reviewed wnl    #Hyperkalemia-resolved  -f/u repeat BMP, no EKG changes  -Patient received 2 doses of Kayexalate     #Hyponatremia-resolved  -Monitor BMP    #Bilateral leg swelling  -improved  -US negative DVT (recent plane ride)  -Could be from low albumen state  -does not appear to be in overt heart failure at this time despite elevated pro-BNP    #HLD  -c/w statin    #DM2  -hold oral meds (Metformin, Glimeperide)  -start ISS  -POCT glucose testing  -F/U A1C    #PREM vs CKD3  -Prior Cr 1.15 (2017) and 1.55 (2022)  -Unclear if we are dealing with PREM on CKD3, vs if this is the new baseline  -Hold Metformin  -Started IVF as pt appears dry  -F/U BMP     #Anemia of chronic disease  #Positive FOBT  -Prior Hb 8-9  -Downtrending this am, patient appears asymptomatic at this time  - Hold anticoagulation   -Continue to monitor, f/u repeat cbc  -FOBT positive   -Low iron and folate, start supplements  -Per family, patient has never had a colonoscopy, but has normally low H/H levels. Per PCP Dr. Merchant patient's hemoglobin normally around 9  -At this time family would like to pursue whatever measures are necessary to help her, understand risks of colonoscopy with older age as well as risks affiliated with blood transfusions   -Blood transfusion form completed and placed in patient's chart  -Consult GI    #DVT ppx: patient anemic, positive FOBT, dropping H/H holding anticoagulation     Case d/w patient's PMD, Dr. Merchant, [phone], who is involved in her care and would appreciate any updates  4/9 Discussed case with patient's son Martin Santana, [phone]    FULL CODE at this time 89F (Gujarati-speaking) with HTN, asthma, DM2, hypothyroidism, recent hospitalization in [place] for "trouble breathing", returned from [place] 5 days ago, comes to the ED with SOB, diagnosed with asthma exacerbation secondary to RSV    #Asthma exacerbation  #RSV infection  -Patient satting 97% RA  -CT chest non-contrast shows trace bilateral pleural effusions   -Continue PO steroids for now (got IV in the ED).... taper schedule to be determined  -Albuterol nebs q6h for now  -d-dimer 506, doppler US B/L LE negative for DVT  -considered V/Q scan (Cr elevated) as pt had tachycardia and was recently on plane ride from [place], but EKG and echo not significant for RV strain, doppler LE negative for DVT, tachycardia resolved as asthma exacerbation improving  -F/U blood cultures    #New A-fib  -Was started on Eliquis, but with worsening anemia, FOBT +, now holding eliquis (will stop ASA for now, no hx of CAD or stroke, risk of bleeding on ASA + Eliquis > benefits)  -Cardio consult  -Echo: LVEF 55-60%, moderate to severe aortic stenosis, grade 3 diastolic dysfunction, severe pulm htn  -troponin negative   -d-dimer positive, would consider V/Q as pt had tachycardia and was recently on plane ride from [place], but EKG and echo not significant for RV strain, doppler LE negative for DVT, tachycardia resolved as asthma exacerbation improving    #Essential HTN  -c/w Norvasc, Clonidine    #Hypothyroidism  -c/w Synthroid  -TSH reviewed wnl    #Hyperkalemia-resolved  -f/u repeat BMP, no EKG changes  -Patient received 2 doses of Kayexalate     #Hyponatremia-resolved  -Monitor BMP    #Bilateral leg swelling  -improved  -US negative DVT (recent plane ride)  -Could be from low albumen state  -does not appear to be in overt heart failure at this time despite elevated pro-BNP    #HLD  -c/w statin    #DM2  -hold oral meds (Metformin, Glimeperide)  -start ISS  -POCT glucose testing  -F/U A1C    #PREM vs CKD3  -Prior Cr 1.15 (2017) and 1.55 (2022)  -Unclear if we are dealing with PREM on CKD3, vs if this is the new baseline  -Hold Metformin  -Started IVF as pt appears dry  -F/U BMP     #Anemia of chronic disease  #Positive FOBT  -Prior Hb 8-9  -Downtrending this am, patient appears asymptomatic at this time  - Hold anticoagulation   -Continue to monitor, f/u repeat cbc  -FOBT positive   -Low iron and folate, start supplements  -Per family, patient has never had a colonoscopy, but has normally low H/H levels. Per PCP Dr. Merchant patient's hemoglobin normally around 9  -At this time family would like to pursue whatever measures are necessary to help her, understand risks of colonoscopy with older age as well as risks affiliated with blood transfusions   -Blood transfusion form completed and placed in patient's chart  -Consult GI    #DVT ppx: patient anemic, positive FOBT, dropping H/H holding anticoagulation     Case d/w patient's PMD, Dr. Merchant, [phone], who is involved in her care and would appreciate any updates  4/9 Discussed case with patient's son Martin Santana, [phone]    FULL CODE at this time 89F (Gujarati-speaking) with HTN, asthma, DM2, hypothyroidism, recent hospitalization in [place] for "trouble breathing", returned from [place] 5 days ago, comes to the ED with SOB, diagnosed with asthma exacerbation secondary to RSV    Asthma exacerbation  RSV infection  -Patient saturating 97% RA  -CT chest non-contrast shows trace bilateral pleural effusions   -Continue PO steroids for now (got IV in the ED).... taper schedule to be determined  -Albuterol nebs q6h for now  -d-dimer 506, doppler US B/L LE negative for DVT  -considered V/Q scan (Cr elevated) as pt had tachycardia and was recently on plane ride from [place], but EKG and echo not significant for RV strain, doppler LE negative for DVT, tachycardia resolved as asthma exacerbation improving  -F/U blood cultures    #New A-fib  -Was started on Eliquis, but with worsening anemia, FOBT +, now holding eliquis (will stop ASA for now, no hx of CAD or stroke, risk of bleeding on ASA + Eliquis > benefits)  -Cardio consult  -Echo: LVEF 55-60%, moderate to severe aortic stenosis, grade 3 diastolic dysfunction, severe pulm htn  -troponin negative   -d-dimer positive, would consider V/Q as pt had tachycardia and was recently on plane ride from [place], but EKG and echo not significant for RV strain, doppler LE negative for DVT, tachycardia resolved as asthma exacerbation improving    #Essential HTN  -c/w Norvasc, Clonidine    #Hypothyroidism  -c/w Synthroid  -TSH reviewed wnl    #Hyperkalemia-resolved  -f/u repeat BMP, no EKG changes  -Patient received 2 doses of Kayexalate     #Hyponatremia-resolved  -Monitor BMP    #Bilateral leg swelling  -improved  -US negative DVT (recent plane ride)  -Could be from low albumen state  -does not appear to be in overt heart failure at this time despite elevated pro-BNP    #HLD  -c/w statin    #DM2  -hold oral meds (Metformin, Glimeperide)  -start ISS  -POCT glucose testing  -F/U A1C    #PREM vs CKD3  -Prior Cr 1.15 (2017) and 1.55 (2022)  -Unclear if we are dealing with PREM on CKD3, vs if this is the new baseline  -Hold Metformin  -Started IVF as pt appears dry  -F/U BMP     #Anemia of chronic disease  #Positive FOBT  -Prior Hb 8-9  -Downtrending this am, patient appears asymptomatic at this time  - Hold anticoagulation   -Continue to monitor, f/u repeat cbc  -FOBT positive   -Low iron and folate, start supplements  -Per family, patient has never had a colonoscopy, but has normally low H/H levels. Per PCP Dr. Merchant patient's hemoglobin normally around 9  -At this time family would like to pursue whatever measures are necessary to help her, understand risks of colonoscopy with older age as well as risks affiliated with blood transfusions   -Blood transfusion form completed and placed in patient's chart  -Consult GI    #DVT ppx: patient anemic, positive FOBT, dropping H/H holding anticoagulation     Case d/w patient's PMD, Dr. Merchant, [phone], who is involved in her care and would appreciate any updates  4/9 Discussed case with patient's son Martin Santana, [phone]    FULL CODE at this time 89F (Gujarati-speaking) with HTN, asthma, DM2, hypothyroidism, recent hospitalization in [place] for "trouble breathing", returned from [place] 5 days ago, comes to the ED with SOB, diagnosed with asthma exacerbation secondary to RSV    Asthma exacerbation  RSV infection  -Patient saturating 97% RA  -CT chest non-contrast shows trace bilateral pleural effusions   -Continue PO steroids for now (got IV in the ED).... taper schedule to be determined  -Albuterol nebs q6h for now  -d-dimer 506, doppler US B/L LE negative for DVT  -considered V/Q scan (Cr elevated) as pt had tachycardia and was recently on plane ride from [place], but EKG and echo not significant for RV strain, doppler LE negative for DVT, tachycardia resolved as asthma exacerbation improving  -F/U blood cultures    #New A-fib  -Was started on Eliquis, but with worsening anemia, FOBT +, now holding eliquis (will stop ASA for now, no hx of CAD or stroke, risk of bleeding on ASA + Eliquis > benefits)  -Cardio consult  -Echo: LVEF 55-60%, moderate to severe aortic stenosis, grade 3 diastolic dysfunction, severe pulm htn  -troponin negative   -d-dimer positive, would consider V/Q as pt had tachycardia and was recently on plane ride from [place], but EKG and echo not significant for RV strain, doppler LE negative for DVT, tachycardia resolved as asthma exacerbation improving    #Essential HTN  -c/w Norvasc, Clonidine    #Hypothyroidism  -c/w Synthroid  -TSH reviewed wnl    #Hyperkalemia-resolved  -f/u repeat BMP, no EKG changes  -Patient received 2 doses of Kayexalate     #Hyponatremia-resolved  -Monitor BMP    #Bilateral leg swelling  -improved  -US negative DVT (recent plane ride)  -Could be from low albumen state  -does not appear to be in overt heart failure at this time despite elevated pro-BNP    #HLD  -c/w statin    #DM2  -hold oral meds (Metformin, Glimeperide)  -start ISS  -POCT glucose testing  -F/U A1C    #PREM vs CKD3  -Prior Cr 1.15 (2017) and 1.55 (2022)  -Unclear if we are dealing with PREM on CKD3, vs if this is the new baseline  -Hold Metformin  -Started IVF as pt appears dry  -F/U BMP     #Anemia of chronic disease  #Positive FOBT  -Prior Hb 8-9  -Downtrending this am, patient appears asymptomatic at this time  - Hold anticoagulation   -Continue to monitor, f/u repeat cbc  -FOBT positive   -Low iron and folate, start supplements  -Per family, patient has never had a colonoscopy, but has normally low H/H levels. Per PCP Dr. Merchant patient's hemoglobin normally around 9  -At this time family would like to pursue whatever measures are necessary to help her, understand risks of colonoscopy with older age as well as risks affiliated with blood transfusions   -Blood transfusion form completed and placed in patient's chart  -Consult GI    #DVT ppx: patient anemic, positive FOBT, dropping H/H holding anticoagulation     Case d/w patient's PMD, Dr. Merchant, [phone], who is involved in her care and would appreciate any updates  4/9 Discussed case with patient's son Martin Santana, [phone]    FULL CODE at this time 89F (Gujarati-speaking) with HTN, asthma, DM2, hypothyroidism, recent hospitalization in [place] for "trouble breathing", returned from [place] 5 days ago, comes to the ED with SOB, diagnosed with asthma exacerbation secondary to RSV    Asthma exacerbation  RSV infection  -Patient saturating 97% RA  -CT chest non-contrast shows trace bilateral pleural effusions   -Continue PO steroids for now (got IV in the ED).... taper schedule to be determined  -Albuterol nebs q6h for now  -d-dimer 506, doppler US B/L LE negative for DVT  -considered V/Q scan (Cr elevated) as pt had tachycardia and was recently on plane ride from [place], but EKG and echo not significant for RV strain, doppler LE negative for DVT, tachycardia resolved as asthma exacerbation improving  -F/U blood cultures    #New A-fib  -Was started on Eliquis, but with worsening anemia, FOBT +, now holding eliquis (will stop ASA for now, no hx of CAD or stroke, risk of bleeding on ASA + Eliquis > benefits)  -Cardio consult  -Echo: LVEF 55-60%, moderate to severe aortic stenosis, grade 3 diastolic dysfunction, severe pulm htn  -troponin negative   -d-dimer positive, would consider V/Q as pt had tachycardia and was recently on plane ride from [place], but EKG and echo not significant for RV strain, doppler LE negative for DVT, tachycardia resolved as asthma exacerbation improving    #Essential HTN  -c/w Norvasc, Clonidine    #Hypothyroidism  -c/w Synthroid  -TSH reviewed wnl    #Hyperkalemia-resolved  -f/u repeat BMP, no EKG changes  -Patient received 2 doses of Kayexalate     #Hyponatremia-resolved  -Monitor BMP    #Bilateral leg swelling  -improved  -US negative DVT (recent plane ride)  -Could be from low albumen state  -does not appear to be in overt heart failure at this time despite elevated pro-BNP    #HLD  -c/w statin    #DM2  -hold oral meds (Metformin, Glimeperide)  -start ISS  -POCT glucose testing  -F/U A1C    #PREM vs CKD3  -Prior Cr 1.15 (2017) and 1.55 (2022)  -Unclear if we are dealing with PREM on CKD3, vs if this is the new baseline  -Hold Metformin  -Started IVF as pt appears dry  -F/U BMP     #Anemia of chronic disease  #Positive FOBT  -Prior Hb 8-9  -Downtrending this am, patient appears asymptomatic at this time  - Hold anticoagulation   -Continue to monitor, f/u repeat cbc  -FOBT positive   -Low iron and folate, start supplements  -Per family, patient has never had a colonoscopy, but has normally low H/H levels. Per PCP Dr. Merchant patient's hemoglobin normally around 9  -At this time family would like to pursue whatever measures are necessary to help her, understand risks of colonoscopy with older age as well as risks affiliated with blood transfusions   -Blood transfusion form completed and placed in patient's chart  -Consult GI    #DVT ppx: patient anemic, positive FOBT, dropping H/H holding anticoagulation     Case d/w patient's PMD, Dr. Merchant, [phone], who is involved in her care and would appreciate any updates  4/9 Discussed case with patient's son Martin Santana, [phone]    FULL CODE at this time

## 2023-04-09 NOTE — PROGRESS NOTE ADULT - ASSESSMENT
IMP  PATIENT WITH AF ASTHMA SIGNIFICANT AS AND VERY SIGNIFICANT WORSENING ANEMIA. HGB OF 7.3 NEEDS TO BE ADDRESSED AS GIVEN HER AGE AND CARDIOPULMONARY ISSUES HER OXYGEN CARRYING CAPACITY IS EXTEMELY COMPROMISED

## 2023-04-09 NOTE — PROVIDER CONTACT NOTE (CRITICAL VALUE NOTIFICATION) - ACTION/TREATMENT ORDERED:
TIFFANIE Horta notified, ordered type and screen and transfusion of 1 unit PRBC's. Consent in chart.

## 2023-04-09 NOTE — PROGRESS NOTE ADULT - SUBJECTIVE AND OBJECTIVE BOX
Patient is a 89y old  Female who presents with a chief complaint of Shortness of Breath (2023 13:20)      Patient seen and examined at bedside. No overnight events reported.    ALLERGIES:  No Known Allergies    MEDICATIONS  (STANDING):  albuterol    0.083% 2.5 milliGRAM(s) Nebulizer every 6 hours  albuterol    90 MICROgram(s) HFA Inhaler 1 Puff(s) Inhalation every 4 hours  amLODIPine   Tablet 5 milliGRAM(s) Oral daily  atorvastatin 20 milliGRAM(s) Oral at bedtime  cloNIDine 0.2 milliGRAM(s) Oral two times a day  dextrose 5%. 1000 milliLiter(s) (50 mL/Hr) IV Continuous <Continuous>  dextrose 5%. 1000 milliLiter(s) (100 mL/Hr) IV Continuous <Continuous>  dextrose 50% Injectable 25 Gram(s) IV Push once  dextrose 50% Injectable 12.5 Gram(s) IV Push once  dextrose 50% Injectable 25 Gram(s) IV Push once  ferrous    sulfate 325 milliGRAM(s) Oral daily  glucagon  Injectable 1 milliGRAM(s) IntraMuscular once  insulin lispro (ADMELOG) corrective regimen sliding scale   SubCutaneous three times a day before meals  insulin lispro (ADMELOG) corrective regimen sliding scale   SubCutaneous at bedtime  levothyroxine 25 MICROGram(s) Oral daily  predniSONE   Tablet 40 milliGRAM(s) Oral daily  sodium chloride 0.9%. 1000 milliLiter(s) (100 mL/Hr) IV Continuous <Continuous>    MEDICATIONS  (PRN):  acetaminophen     Tablet .. 650 milliGRAM(s) Oral every 6 hours PRN Temp greater or equal to 38C (100.4F), Mild Pain (1 - 3)  aluminum hydroxide/magnesium hydroxide/simethicone Suspension 30 milliLiter(s) Oral every 4 hours PRN Dyspepsia  dextrose Oral Gel 15 Gram(s) Oral once PRN Blood Glucose LESS THAN 70 milliGRAM(s)/deciliter  melatonin 3 milliGRAM(s) Oral at bedtime PRN Insomnia  ondansetron Injectable 4 milliGRAM(s) IV Push every 8 hours PRN Nausea and/or Vomiting    Vital Signs Last 24 Hrs  T(F): 97.4 (2023 05:19), Max: 97.9 (2023 15:30)  HR: 113 (2023 09:21) (76 - 113)  BP: 110/64 (2023 05:19) (108/69 - 110/64)  RR: 18 (2023 05:19) (18 - 19)  SpO2: 99% (2023 09:21) (98% - 100%)  I&O's Summary    2023 07:01  -  2023 07:00  --------------------------------------------------------  IN: 1540 mL / OUT: 700 mL / NET: 840 mL    2023 07:01  -  2023 11:13  --------------------------------------------------------  IN: 400 mL / OUT: 0 mL / NET: 400 mL      PHYSICAL EXAM:  General: NAD, Awake, lying comfortably in bed asking for coffee  ENT: No gross hearing impairment, Moist mucous membranes, no thrush  Neck: Supple, No JVD  Lungs: Scant wheezes to auscultation bilaterally, good air entry, non-labored breathing  Cardio: irregular RR, S1/S2, + murmur  Abdomen: Soft, Nontender, Nondistended; Bowel sounds present  : dark appearing urine  Extremities: No calf tenderness, No cyanosis, No pitting edema  Psych: Appropriate mood and affect    LABS:                        7.3    8.28  )-----------( 286      ( 2023 05:45 )             23.8     04-09    135  |  97  |  43  ----------------------------<  116  4.4   |  32  |  2.01    Ca    8.6      2023 05:45  Phos  4.5     04-08  Mg     2.4     04-08    TPro  6.5  /  Alb  2.8  /  TBili  0.2  /  DBili  x   /  AST  9   /  ALT  7   /  AlkPhos  76  04-07          PT/INR - ( 2023 15:30 )   PT: 11.0 sec;   INR: 0.95 ratio         PTT - ( 2023 15:30 )  PTT:31.7 sec  Lactate, Blood: 0.9 mmol/L ( @ 15:30)      CARDIAC MARKERS ( 2023 20:10 )  x     / 18.9 ng/L / x     / x     / x            TSH 1.383   TSH with FT4 reflex --  Total T3 --      ABG - ( 2023 15:59 )  pH, Arterial: 7.40  pH, Blood: x     /  pCO2: 45    /  pO2: 196   / HCO3: 28    / Base Excess: 3.1   /  SaO2: 99.7                    POCT Blood Glucose.: 153 mg/dL (2023 07:43)  POCT Blood Glucose.: 256 mg/dL (2023 21:15)  POCT Blood Glucose.: 127 mg/dL (2023 17:17)  POCT Blood Glucose.: 253 mg/dL (2023 12:20)  POCT Blood Glucose.: 116 mg/dL (2023 12:17)      Urinalysis Basic - ( 2023 18:55 )    Color: Yellow / Appearance: Clear / S.010 / pH: x  Gluc: x / Ketone: Negative  / Bili: Negative / Urobili: Negative   Blood: x / Protein: 30 mg/dL / Nitrite: Negative   Leuk Esterase: Moderate / RBC: 11-25 /HPF / WBC 11-25 /HPF   Sq Epi: x / Non Sq Epi: x / Bacteria: Moderate /HPF        Culture - Urine (collected 2023 18:55)  Source: Clean Catch Clean Catch (Midstream)  Final Report (2023 11:03):    >=3 organisms. Probable collection contamination.    Culture - Blood (collected 2023 15:30)  Source: .Blood Blood-Peripheral  Preliminary Report (2023 22:02):    No growth to date.    Culture - Blood (collected 2023 15:30)  Source: .Blood Blood-Peripheral  Preliminary Report (2023 22:02):    No growth to date.        RADIOLOGY & ADDITIONAL TESTS:    Care Discussed with Consultants/Other Providers:    Patient is a 89y old  Female who presents with a chief complaint of Shortness of Breath (2023 13:20)      Patient seen and examined at bedside. No overnight events reported.    ALLERGIES:  No Known Allergies    MEDICATIONS  (STANDING):  albuterol    0.083% 2.5 milliGRAM(s) Nebulizer every 6 hours  albuterol    90 MICROgram(s) HFA Inhaler 1 Puff(s) Inhalation every 4 hours  amLODIPine   Tablet 5 milliGRAM(s) Oral daily  atorvastatin 20 milliGRAM(s) Oral at bedtime  cloNIDine 0.2 milliGRAM(s) Oral two times a day  dextrose 5%. 1000 milliLiter(s) (50 mL/Hr) IV Continuous <Continuous>  dextrose 5%. 1000 milliLiter(s) (100 mL/Hr) IV Continuous <Continuous>  dextrose 50% Injectable 25 Gram(s) IV Push once  dextrose 50% Injectable 12.5 Gram(s) IV Push once  dextrose 50% Injectable 25 Gram(s) IV Push once  ferrous    sulfate 325 milliGRAM(s) Oral daily  glucagon  Injectable 1 milliGRAM(s) IntraMuscular once  insulin lispro (ADMELOG) corrective regimen sliding scale   SubCutaneous three times a day before meals  insulin lispro (ADMELOG) corrective regimen sliding scale   SubCutaneous at bedtime  levothyroxine 25 MICROGram(s) Oral daily  predniSONE   Tablet 40 milliGRAM(s) Oral daily  sodium chloride 0.9%. 1000 milliLiter(s) (100 mL/Hr) IV Continuous <Continuous>    MEDICATIONS  (PRN):  acetaminophen     Tablet .. 650 milliGRAM(s) Oral every 6 hours PRN Temp greater or equal to 38C (100.4F), Mild Pain (1 - 3)  aluminum hydroxide/magnesium hydroxide/simethicone Suspension 30 milliLiter(s) Oral every 4 hours PRN Dyspepsia  dextrose Oral Gel 15 Gram(s) Oral once PRN Blood Glucose LESS THAN 70 milliGRAM(s)/deciliter  melatonin 3 milliGRAM(s) Oral at bedtime PRN Insomnia  ondansetron Injectable 4 milliGRAM(s) IV Push every 8 hours PRN Nausea and/or Vomiting    Vital Signs Last 24 Hrs  T(F): 97.4 (2023 05:19), Max: 97.9 (2023 15:30)  HR: 113 (2023 09:21) (76 - 113)  BP: 110/64 (2023 05:19) (108/69 - 110/64)  RR: 18 (2023 05:19) (18 - 19)  SpO2: 99% (2023 09:21) (98% - 100%)  I&O's Summary    2023 07:01  -  2023 07:00  --------------------------------------------------------  IN: 1540 mL / OUT: 700 mL / NET: 840 mL    2023 07:01  -  2023 11:13  --------------------------------------------------------  IN: 400 mL / OUT: 0 mL / NET: 400 mL      PHYSICAL EXAM:  General: NAD, Awake, lying comfortably in bed asking for coffee  ENT: gross hearing impairment, Moist mucous membranes, no thrush  Neck: Supple, No JVD  Lungs: Scant wheezes to auscultation bilaterally, good air entry, non-labored breathing  Cardio: irregular RR, S1/S2, + murmur  Abdomen: Soft, Nontender, Nondistended; Bowel sounds present  : dark appearing urine  Extremities: No calf tenderness, No cyanosis, No pitting edema  Psych: Appropriate mood and affect    LABS:                        7.3    8.28  )-----------( 286      ( 2023 05:45 )             23.8     04-09    135  |  97  |  43  ----------------------------<  116  4.4   |  32  |  2.01    Ca    8.6      2023 05:45  Phos  4.5     04-08  Mg     2.4     04-08    TPro  6.5  /  Alb  2.8  /  TBili  0.2  /  DBili  x   /  AST  9   /  ALT  7   /  AlkPhos  76  04-07          PT/INR - ( 2023 15:30 )   PT: 11.0 sec;   INR: 0.95 ratio         PTT - ( 2023 15:30 )  PTT:31.7 sec  Lactate, Blood: 0.9 mmol/L ( @ 15:30)      CARDIAC MARKERS ( 2023 20:10 )  x     / 18.9 ng/L / x     / x     / x            TSH 1.383   TSH with FT4 reflex --  Total T3 --      ABG - ( 2023 15:59 )  pH, Arterial: 7.40  pH, Blood: x     /  pCO2: 45    /  pO2: 196   / HCO3: 28    / Base Excess: 3.1   /  SaO2: 99.7                    POCT Blood Glucose.: 153 mg/dL (2023 07:43)  POCT Blood Glucose.: 256 mg/dL (2023 21:15)  POCT Blood Glucose.: 127 mg/dL (2023 17:17)  POCT Blood Glucose.: 253 mg/dL (2023 12:20)  POCT Blood Glucose.: 116 mg/dL (2023 12:17)      Urinalysis Basic - ( 2023 18:55 )    Color: Yellow / Appearance: Clear / S.010 / pH: x  Gluc: x / Ketone: Negative  / Bili: Negative / Urobili: Negative   Blood: x / Protein: 30 mg/dL / Nitrite: Negative   Leuk Esterase: Moderate / RBC: 11-25 /HPF / WBC 11-25 /HPF   Sq Epi: x / Non Sq Epi: x / Bacteria: Moderate /HPF        Culture - Urine (collected 2023 18:55)  Source: Clean Catch Clean Catch (Midstream)  Final Report (2023 11:03):    >=3 organisms. Probable collection contamination.    Culture - Blood (collected 2023 15:30)  Source: .Blood Blood-Peripheral  Preliminary Report (2023 22:02):    No growth to date.    Culture - Blood (collected 2023 15:30)  Source: .Blood Blood-Peripheral  Preliminary Report (2023 22:02):    No growth to date.        RADIOLOGY & ADDITIONAL TESTS:    Care Discussed with Consultants/Other Providers:    Patient is a 89y old  Female who presents with a chief complaint of Shortness of Breath (2023 13:20)      Patient seen and examined at bedside. No overnight events reported.    ALLERGIES:  No Known Allergies    MEDICATIONS  (STANDING):  albuterol    0.083% 2.5 milliGRAM(s) Nebulizer every 6 hours  albuterol    90 MICROgram(s) HFA Inhaler 1 Puff(s) Inhalation every 4 hours  amLODIPine   Tablet 5 milliGRAM(s) Oral daily  atorvastatin 20 milliGRAM(s) Oral at bedtime  cloNIDine 0.2 milliGRAM(s) Oral two times a day  dextrose 5%. 1000 milliLiter(s) (50 mL/Hr) IV Continuous <Continuous>  dextrose 5%. 1000 milliLiter(s) (100 mL/Hr) IV Continuous <Continuous>  dextrose 50% Injectable 25 Gram(s) IV Push once  dextrose 50% Injectable 12.5 Gram(s) IV Push once  dextrose 50% Injectable 25 Gram(s) IV Push once  ferrous    sulfate 325 milliGRAM(s) Oral daily  glucagon  Injectable 1 milliGRAM(s) IntraMuscular once  insulin lispro (ADMELOG) corrective regimen sliding scale   SubCutaneous three times a day before meals  insulin lispro (ADMELOG) corrective regimen sliding scale   SubCutaneous at bedtime  levothyroxine 25 MICROGram(s) Oral daily  predniSONE   Tablet 40 milliGRAM(s) Oral daily  sodium chloride 0.9%. 1000 milliLiter(s) (100 mL/Hr) IV Continuous <Continuous>    MEDICATIONS  (PRN):  acetaminophen     Tablet .. 650 milliGRAM(s) Oral every 6 hours PRN Temp greater or equal to 38C (100.4F), Mild Pain (1 - 3)  aluminum hydroxide/magnesium hydroxide/simethicone Suspension 30 milliLiter(s) Oral every 4 hours PRN Dyspepsia  dextrose Oral Gel 15 Gram(s) Oral once PRN Blood Glucose LESS THAN 70 milliGRAM(s)/deciliter  melatonin 3 milliGRAM(s) Oral at bedtime PRN Insomnia  ondansetron Injectable 4 milliGRAM(s) IV Push every 8 hours PRN Nausea and/or Vomiting    Vital Signs Last 24 Hrs  T(F): 97.4 (2023 05:19), Max: 97.9 (2023 15:30)  HR: 113 (2023 09:21) (76 - 113)  BP: 110/64 (2023 05:19) (108/69 - 110/64)  RR: 18 (2023 05:19) (18 - 19)  SpO2: 99% (2023 09:21) (98% - 100%)  I&O's Summary    2023 07:01  -  2023 07:00  --------------------------------------------------------  IN: 1540 mL / OUT: 700 mL / NET: 840 mL    2023 07:01  -  2023 11:13  --------------------------------------------------------  IN: 400 mL / OUT: 0 mL / NET: 400 mL      PHYSICAL EXAM:  General: NAD, Awake, lying comfortably in bed asking for coffee  ENT: gross hearing impairment, Moist mucous membranes, no thrush  Neck: Supple, No JVD  Lungs: Scant wheezes to auscultation bilaterally, good air entry, non-labored breathing  Cardio: irregular RR, S1/S2, + murmur  Abdomen: Soft, Nontender, Nondistended; Bowel sounds present  : dark appearing urine  Extremities: No calf tenderness, No cyanosis, No pitting edema  Psych: Appropriate mood and affect    LABS:                        7.3    8.28  )-----------( 286      ( 2023 05:45 )             23.8     04-09    135  |  97  |  43  ----------------------------<  116  4.4   |  32  |  2.01    Ca    8.6      2023 05:45  Phos  4.5     04-08  Mg     2.4     04-08    TPro  6.5  /  Alb  2.8  /  TBili  0.2  /  DBili  x   /  AST  9   /  ALT  7   /  AlkPhos  76  04-07    PT/INR - ( 2023 15:30 )   PT: 11.0 sec;   INR: 0.95 ratio       PTT - ( 2023 15:30 )  PTT:31.7 sec  Lactate, Blood: 0.9 mmol/L ( @ 15:30)    CARDIAC MARKERS ( 2023 20:10 )  x     / 18.9 ng/L / x     / x     / x        TSH 1.383   TSH with FT4 reflex --  Total T3 --    ABG - ( 2023 15:59 )  pH, Arterial: 7.40  pH, Blood: x     /  pCO2: 45    /  pO2: 196   / HCO3: 28    / Base Excess: 3.1   /  SaO2: 99.7      POCT Blood Glucose.: 153 mg/dL (2023 07:43)  POCT Blood Glucose.: 256 mg/dL (2023 21:15)  POCT Blood Glucose.: 127 mg/dL (2023 17:17)  POCT Blood Glucose.: 253 mg/dL (2023 12:20)  POCT Blood Glucose.: 116 mg/dL (2023 12:17)    Urinalysis Basic - ( 2023 18:55 )    Color: Yellow / Appearance: Clear / S.010 / pH: x  Gluc: x / Ketone: Negative  / Bili: Negative / Urobili: Negative   Blood: x / Protein: 30 mg/dL / Nitrite: Negative   Leuk Esterase: Moderate / RBC: 11-25 /HPF / WBC 11-25 /HPF   Sq Epi: x / Non Sq Epi: x / Bacteria: Moderate /HPF    Culture - Urine (collected 2023 18:55)  Source: Clean Catch Clean Catch (Midstream)  Final Report (2023 11:03):    >=3 organisms. Probable collection contamination.    Culture - Blood (collected 2023 15:30)  Source: .Blood Blood-Peripheral  Preliminary Report (2023 22:02):    No growth to date.    Culture - Blood (collected 2023 15:30)  Source: .Blood Blood-Peripheral  Preliminary Report (2023 22:02):    No growth to date.    RADIOLOGY & ADDITIONAL TESTS:    Care Discussed with Consultants/Other Providers:

## 2023-04-09 NOTE — PROGRESS NOTE ADULT - NS ATTEND AMEND GEN_ALL_CORE FT
Asthma exacerbation secondary to RSV  - much improved  - on room air    Anemia of unclear etiology  - low hemoglobin  - occult blood positive  - GI consult

## 2023-04-09 NOTE — CHART NOTE - NSCHARTNOTEFT_GEN_A_CORE
F/U Note:    89F (Gujarati-speaking) with HTN, asthma, DM2, hypothyroidism, recent hospitalization in [place] for "trouble breathing", returned from [place] 5 days ago, comes to the ED with SOB, diagnosed with asthma exacerbation secondary to RSV    Interval Hx;    Patient with elevated potassium of 6.1. During day shift given 1 dose of kayexalate . Repeat bmp with potassium to now 5.5. Given another dose of kayexalate overnight. Repeat K+ now 5. Will fu with morning labs.       Vital Signs Last 24 Hrs  T(C): 36.3 (08 Apr 2023 21:11), Max: 36.6 (08 Apr 2023 15:30)  T(F): 97.3 (08 Apr 2023 21:11), Max: 97.9 (08 Apr 2023 15:30)  HR: 90 (08 Apr 2023 21:54) (74 - 109)  BP: 109/63 (08 Apr 2023 21:11) (108/69 - 179/95)  BP(mean): --  RR: 18 (08 Apr 2023 21:11) (18 - 19)  SpO2: 98% (08 Apr 2023 21:54) (93% - 100%)    Parameters below as of 08 Apr 2023 21:54  Patient On (Oxygen Delivery Method): nasal cannula                                8.1    7.77  )-----------( 314      ( 08 Apr 2023 08:31 )             25.3         04-09    134<L>  |  97  |  43<H>  ----------------------------<  174<H>  5.0   |  31  |  1.94<H>    Ca    8.5      09 Apr 2023 01:15  Phos  4.5     04-08  Mg     2.4     04-08    TPro  6.5  /  Alb  2.8<L>  /  TBili  0.2  /  DBili  x   /  AST  9<L>  /  ALT  7<L>  /  AlkPhos  76  04-07

## 2023-04-09 NOTE — PROVIDER CONTACT NOTE (CRITICAL VALUE NOTIFICATION) - SITUATION
Patient admitted for asthma exacerbation, RSV positive, occult blood positive, history of asthma, hypertension, diabetes, hypothyroidism, low HGB.

## 2023-04-09 NOTE — PROGRESS NOTE ADULT - SUBJECTIVE AND OBJECTIVE BOX
Follow up for AF/AS  SUBJ: PATIENT LOOKS BETTER TODAY . MORE COFORTABLE  PMH  Moderate asthma    Hypertension        MEDICATIONS  (STANDING):  albuterol    0.083% 2.5 milliGRAM(s) Nebulizer every 6 hours  albuterol    90 MICROgram(s) HFA Inhaler 1 Puff(s) Inhalation every 4 hours  amLODIPine   Tablet 5 milliGRAM(s) Oral daily  atorvastatin 20 milliGRAM(s) Oral at bedtime  cloNIDine 0.2 milliGRAM(s) Oral two times a day  dextrose 5%. 1000 milliLiter(s) (50 mL/Hr) IV Continuous <Continuous>  dextrose 5%. 1000 milliLiter(s) (100 mL/Hr) IV Continuous <Continuous>  dextrose 50% Injectable 25 Gram(s) IV Push once  dextrose 50% Injectable 12.5 Gram(s) IV Push once  dextrose 50% Injectable 25 Gram(s) IV Push once  ferrous    sulfate 325 milliGRAM(s) Oral daily  folic acid 1 milliGRAM(s) Oral daily  glucagon  Injectable 1 milliGRAM(s) IntraMuscular once  insulin lispro (ADMELOG) corrective regimen sliding scale   SubCutaneous three times a day before meals  insulin lispro (ADMELOG) corrective regimen sliding scale   SubCutaneous at bedtime  levothyroxine 25 MICROGram(s) Oral daily  predniSONE   Tablet 40 milliGRAM(s) Oral daily  sodium chloride 0.9%. 1000 milliLiter(s) (100 mL/Hr) IV Continuous <Continuous>    MEDICATIONS  (PRN):  acetaminophen     Tablet .. 650 milliGRAM(s) Oral every 6 hours PRN Temp greater or equal to 38C (100.4F), Mild Pain (1 - 3)  aluminum hydroxide/magnesium hydroxide/simethicone Suspension 30 milliLiter(s) Oral every 4 hours PRN Dyspepsia  dextrose Oral Gel 15 Gram(s) Oral once PRN Blood Glucose LESS THAN 70 milliGRAM(s)/deciliter  melatonin 3 milliGRAM(s) Oral at bedtime PRN Insomnia  ondansetron Injectable 4 milliGRAM(s) IV Push every 8 hours PRN Nausea and/or Vomiting        PHYSICAL EXAM:  Vital Signs Last 24 Hrs  T(C): 36.3 (09 Apr 2023 05:19), Max: 36.6 (08 Apr 2023 15:30)  T(F): 97.4 (09 Apr 2023 05:19), Max: 97.9 (08 Apr 2023 15:30)  HR: 90"s (09 Apr 2023 09:21) (76 - 113)  BP: 110/64 (09 Apr 2023 05:19) (108/69 - 110/64)  BP(mean): --  RR: 18 (09 Apr 2023 05:19) (18 - 19)  SpO2: 99% (09 Apr 2023 09:21) (98% - 100%)    Parameters below as of 09 Apr 2023 09:21  Patient On (Oxygen Delivery Method): nasal cannula        GENERAL: NAD, well-groomed, well-developed  HEAD:  Atraumatic, Normocephalic  EYES: EOMI, PERRLA, conjunctiva and sclera clear  ENT: Moist mucous membranes,  NECK: Supple, No JVD, no bruits  CHEST/LUNG: grossly clear  HEART: irregular 3/6 charly  ABDOMEN: Soft, Nontender, Nondistended; Bowel sounds present  EXTREMITIES:  2+ Peripheral Pulses, No clubbing, cyanosis, or edema  SKIN: No rashes or lesions        TELEMETRY: af with moderate response        LABS:                        7.3    8.28  )-----------( 286      ( 09 Apr 2023 05:45 )             23.8     04-09    135  |  97  |  43<H>  ----------------------------<  116<H>  4.4   |  32<H>  |  2.01<H>    Ca    8.6      09 Apr 2023 05:45  Phos  4.5     04-08  Mg     2.4     04-08    TPro  6.5  /  Alb  2.8<L>  /  TBili  0.2  /  DBili  x   /  AST  9<L>  /  ALT  7<L>  /  AlkPhos  76  04-07        PT/INR - ( 07 Apr 2023 15:30 )   PT: 11.0 sec;   INR: 0.95 ratio         PTT - ( 07 Apr 2023 15:30 )  PTT:31.7 sec    I&O's Summary    08 Apr 2023 07:01  -  09 Apr 2023 07:00  --------------------------------------------------------  IN: 1540 mL / OUT: 700 mL / NET: 840 mL    09 Apr 2023 07:01  -  09 Apr 2023 12:44  --------------------------------------------------------  IN: 400 mL / OUT: 0 mL / NET: 400 mL      BNP    RADIOLOGY & ADDITIONAL STUDIES:    ECHO:

## 2023-04-10 DIAGNOSIS — K59.00 CONSTIPATION, UNSPECIFIED: ICD-10-CM

## 2023-04-10 DIAGNOSIS — D64.9 ANEMIA, UNSPECIFIED: ICD-10-CM

## 2023-04-10 LAB
ABO RH CONFIRMATION: SIGNIFICANT CHANGE UP
ANION GAP SERPL CALC-SCNC: 7 MMOL/L — SIGNIFICANT CHANGE UP (ref 5–17)
BUN SERPL-MCNC: 45 MG/DL — HIGH (ref 7–23)
CALCIUM SERPL-MCNC: 8.2 MG/DL — LOW (ref 8.4–10.5)
CHLORIDE SERPL-SCNC: 95 MMOL/L — LOW (ref 96–108)
CO2 SERPL-SCNC: 29 MMOL/L — SIGNIFICANT CHANGE UP (ref 22–31)
CREAT SERPL-MCNC: 1.99 MG/DL — HIGH (ref 0.5–1.3)
EGFR: 24 ML/MIN/1.73M2 — LOW
GLUCOSE BLDC GLUCOMTR-MCNC: 199 MG/DL — HIGH (ref 70–99)
GLUCOSE BLDC GLUCOMTR-MCNC: 229 MG/DL — HIGH (ref 70–99)
GLUCOSE BLDC GLUCOMTR-MCNC: 254 MG/DL — HIGH (ref 70–99)
GLUCOSE BLDC GLUCOMTR-MCNC: 266 MG/DL — HIGH (ref 70–99)
GLUCOSE SERPL-MCNC: 154 MG/DL — HIGH (ref 70–99)
HCT VFR BLD CALC: 26.2 % — LOW (ref 34.5–45)
HCT VFR BLD CALC: 27.3 % — LOW (ref 34.5–45)
HGB BLD-MCNC: 8.4 G/DL — LOW (ref 11.5–15.5)
HGB BLD-MCNC: 8.7 G/DL — LOW (ref 11.5–15.5)
MCHC RBC-ENTMCNC: 21.3 PG — LOW (ref 27–34)
MCHC RBC-ENTMCNC: 21.4 PG — LOW (ref 27–34)
MCHC RBC-ENTMCNC: 31.9 GM/DL — LOW (ref 32–36)
MCHC RBC-ENTMCNC: 32.1 GM/DL — SIGNIFICANT CHANGE UP (ref 32–36)
MCV RBC AUTO: 66.8 FL — LOW (ref 80–100)
MCV RBC AUTO: 66.9 FL — LOW (ref 80–100)
NRBC # BLD: 0 /100 WBCS — SIGNIFICANT CHANGE UP (ref 0–0)
PLATELET # BLD AUTO: 258 K/UL — SIGNIFICANT CHANGE UP (ref 150–400)
PLATELET # BLD AUTO: 265 K/UL — SIGNIFICANT CHANGE UP (ref 150–400)
POTASSIUM SERPL-MCNC: 5.3 MMOL/L — SIGNIFICANT CHANGE UP (ref 3.5–5.3)
POTASSIUM SERPL-SCNC: 5.3 MMOL/L — SIGNIFICANT CHANGE UP (ref 3.5–5.3)
RBC # BLD: 3.92 M/UL — SIGNIFICANT CHANGE UP (ref 3.8–5.2)
RBC # BLD: 4.08 M/UL — SIGNIFICANT CHANGE UP (ref 3.8–5.2)
RBC # FLD: 17.6 % — HIGH (ref 10.3–14.5)
RBC # FLD: 18.3 % — HIGH (ref 10.3–14.5)
SODIUM SERPL-SCNC: 131 MMOL/L — LOW (ref 135–145)
WBC # BLD: 8.18 K/UL — SIGNIFICANT CHANGE UP (ref 3.8–10.5)
WBC # BLD: 9.37 K/UL — SIGNIFICANT CHANGE UP (ref 3.8–10.5)
WBC # FLD AUTO: 8.18 K/UL — SIGNIFICANT CHANGE UP (ref 3.8–10.5)
WBC # FLD AUTO: 9.37 K/UL — SIGNIFICANT CHANGE UP (ref 3.8–10.5)

## 2023-04-10 PROCEDURE — 99223 1ST HOSP IP/OBS HIGH 75: CPT

## 2023-04-10 PROCEDURE — 99232 SBSQ HOSP IP/OBS MODERATE 35: CPT

## 2023-04-10 RX ORDER — POLYETHYLENE GLYCOL 3350 17 G/17G
17 POWDER, FOR SOLUTION ORAL DAILY
Refills: 0 | Status: DISCONTINUED | OUTPATIENT
Start: 2023-04-10 | End: 2023-04-15

## 2023-04-10 RX ORDER — SENNA PLUS 8.6 MG/1
2 TABLET ORAL AT BEDTIME
Refills: 0 | Status: DISCONTINUED | OUTPATIENT
Start: 2023-04-10 | End: 2023-04-15

## 2023-04-10 RX ORDER — PANTOPRAZOLE SODIUM 20 MG/1
40 TABLET, DELAYED RELEASE ORAL DAILY
Refills: 0 | Status: DISCONTINUED | OUTPATIENT
Start: 2023-04-10 | End: 2023-04-15

## 2023-04-10 RX ADMIN — ALBUTEROL 2.5 MILLIGRAM(S): 90 AEROSOL, METERED ORAL at 21:55

## 2023-04-10 RX ADMIN — Medication 6: at 18:01

## 2023-04-10 RX ADMIN — Medication 2: at 08:09

## 2023-04-10 RX ADMIN — SENNA PLUS 2 TABLET(S): 8.6 TABLET ORAL at 21:45

## 2023-04-10 RX ADMIN — Medication 30 MILLILITER(S): at 15:57

## 2023-04-10 RX ADMIN — ALBUTEROL 2.5 MILLIGRAM(S): 90 AEROSOL, METERED ORAL at 15:31

## 2023-04-10 RX ADMIN — Medication 325 MILLIGRAM(S): at 11:44

## 2023-04-10 RX ADMIN — ALBUTEROL 2.5 MILLIGRAM(S): 90 AEROSOL, METERED ORAL at 03:31

## 2023-04-10 RX ADMIN — Medication 650 MILLIGRAM(S): at 21:47

## 2023-04-10 RX ADMIN — Medication 0.2 MILLIGRAM(S): at 05:18

## 2023-04-10 RX ADMIN — Medication 1 MILLIGRAM(S): at 11:44

## 2023-04-10 RX ADMIN — Medication 6: at 11:44

## 2023-04-10 RX ADMIN — ALBUTEROL 2.5 MILLIGRAM(S): 90 AEROSOL, METERED ORAL at 09:51

## 2023-04-10 RX ADMIN — AMLODIPINE BESYLATE 5 MILLIGRAM(S): 2.5 TABLET ORAL at 05:18

## 2023-04-10 RX ADMIN — ATORVASTATIN CALCIUM 20 MILLIGRAM(S): 80 TABLET, FILM COATED ORAL at 21:45

## 2023-04-10 RX ADMIN — Medication 40 MILLIGRAM(S): at 05:18

## 2023-04-10 RX ADMIN — POLYETHYLENE GLYCOL 3350 17 GRAM(S): 17 POWDER, FOR SOLUTION ORAL at 18:04

## 2023-04-10 RX ADMIN — Medication 650 MILLIGRAM(S): at 22:17

## 2023-04-10 RX ADMIN — Medication 25 MICROGRAM(S): at 05:18

## 2023-04-10 RX ADMIN — Medication 0.2 MILLIGRAM(S): at 18:04

## 2023-04-10 NOTE — DIETITIAN INITIAL EVALUATION ADULT - ORAL INTAKE PTA/DIET HISTORY
Patient lethargic, unable to obtain diet Hx from patient, no family at bedside. Will obtain subjective wt/diet history from pt/family PRN.

## 2023-04-10 NOTE — DIETITIAN INITIAL EVALUATION ADULT - PERTINENT LABORATORY DATA
04-10    131<L>  |  95<L>  |  45<H>  ----------------------------<  154<H>  5.3   |  29  |  1.99<H>    Ca    8.2<L>      10 Apr 2023 06:11    POCT Blood Glucose.: 199 mg/dL (04-10-23 @ 08:05)

## 2023-04-10 NOTE — CHART NOTE - NSCHARTNOTEFT_GEN_A_CORE
Called by RN to evaluate pt with hypertesion    Patient is a 89y old  Female who presents with a chief complaint of Shortness of Breath (09 Apr 2023 12:43)      INTERVAL HPI/OVERNIGHT EVENTS:  Pt s/p PRBC overnight for low H&H   On IVF   /79, the rest of vitals stable    MEDICATIONS  (STANDING):  albuterol    0.083% 2.5 milliGRAM(s) Nebulizer every 6 hours  albuterol    90 MICROgram(s) HFA Inhaler 1 Puff(s) Inhalation every 4 hours  amLODIPine   Tablet 5 milliGRAM(s) Oral daily  atorvastatin 20 milliGRAM(s) Oral at bedtime  cloNIDine 0.2 milliGRAM(s) Oral two times a day  dextrose 5%. 1000 milliLiter(s) (50 mL/Hr) IV Continuous <Continuous>  dextrose 5%. 1000 milliLiter(s) (100 mL/Hr) IV Continuous <Continuous>  dextrose 50% Injectable 25 Gram(s) IV Push once  dextrose 50% Injectable 12.5 Gram(s) IV Push once  dextrose 50% Injectable 25 Gram(s) IV Push once  ferrous    sulfate 325 milliGRAM(s) Oral daily  folic acid 1 milliGRAM(s) Oral daily  glucagon  Injectable 1 milliGRAM(s) IntraMuscular once  insulin lispro (ADMELOG) corrective regimen sliding scale   SubCutaneous three times a day before meals  insulin lispro (ADMELOG) corrective regimen sliding scale   SubCutaneous at bedtime  levothyroxine 25 MICROGram(s) Oral daily  predniSONE   Tablet 40 milliGRAM(s) Oral daily    MEDICATIONS  (PRN):  acetaminophen     Tablet .. 650 milliGRAM(s) Oral every 6 hours PRN Temp greater or equal to 38C (100.4F), Mild Pain (1 - 3)  aluminum hydroxide/magnesium hydroxide/simethicone Suspension 30 milliLiter(s) Oral every 4 hours PRN Dyspepsia  dextrose Oral Gel 15 Gram(s) Oral once PRN Blood Glucose LESS THAN 70 milliGRAM(s)/deciliter  melatonin 3 milliGRAM(s) Oral at bedtime PRN Insomnia  ondansetron Injectable 4 milliGRAM(s) IV Push every 8 hours PRN Nausea and/or Vomiting      Allergies    No Known Allergies    Intolerances        REVIEW OF SYSTEMS:  CONSTITUTIONAL:   No fever, weight loss, or fatigue  No N/V/D      Vital Signs Last 24 Hrs  T(C): 36.7 (10 Apr 2023 05:24), Max: 36.7 (10 Apr 2023 05:24)  T(F): 98.1 (10 Apr 2023 05:24), Max: 98.1 (10 Apr 2023 05:24)  HR: 89 (10 Apr 2023 05:24) (64 - 113)  BP: 161/79 (10 Apr 2023 05:24) (110/67 - 161/79)  BP(mean): --  RR: 16 (10 Apr 2023 05:24) (15 - 18)  SpO2: 100% (10 Apr 2023 05:24) (99% - 100%)    Parameters below as of 10 Apr 2023 05:24  Patient On (Oxygen Delivery Method): nasal cannula  O2 Flow (L/min): 1      PHYSICAL EXAM:  GENERAL: NAD, well-groomed, well-developed  HEAD:  Atraumatic, Normocephalic  EYES: EOMI, PERRLA, conjunctiva and sclera clear  ENMT: Moist mucous membranes, Good dentition, No lesions; No tonsillar erythema, exudates, or enlargement  NECK: Supple, No JVD, Normal thyroid  NERVOUS SYSTEM:  Alert & Oriented X3, Good concentration; All 4 extremities mobile, no gross sensory deficits.   CHEST/LUNG: Clear to auscultation bilaterally; No rales, rhonchi, wheezing, or rubs  HEART: Regular rate and rhythm; No murmurs, rubs, or gallops  ABDOMEN: Soft, Nontender, Nondistended; Bowel sounds present  EXTREMITIES:  2+ Peripheral Pulses, No clubbing, cyanosis, or edema  LYMPH: No lymphadenopathy noted  SKIN: No rashes or lesions    LABS:                        6.6    6.94  )-----------( 252      ( 09 Apr 2023 19:15 )             20.8       Ca    8.6        09 Apr 2023 05:45          CAPILLARY BLOOD GLUCOSE      POCT Blood Glucose.: 215 mg/dL (09 Apr 2023 21:32)  POCT Blood Glucose.: 341 mg/dL (09 Apr 2023 16:36)  POCT Blood Glucose.: 318 mg/dL (09 Apr 2023 12:14)  POCT Blood Glucose.: 153 mg/dL (09 Apr 2023 07:43)      Assessment/Plan:  89F (Gujarati-speaking) with HTN, asthma, DM2, hypothyroidism, recent hospitalization in [place] for "trouble breathing", returned from [facility] 5 days ago, comes to the ED with SOB, diagnosed with asthma exacerbation secondary to RSV    #Asthma exacerbation  #RSV infection  # Anemia  #HTN  -CT chest non-contrast shows trace bilateral pleural effusions   - Continue PRN Nebulizer  - S/P PRBC overnight repeat CBC pending  - DC IVF in the setting of PO  diet and elevated BP  - Monitor VS Called by RN to evaluate pt with hypertesion    Patient is a 89y old  Female who presents with a chief complaint of Shortness of Breath (09 Apr 2023 12:43)      INTERVAL HPI/OVERNIGHT EVENTS:  Pt s/p PRBC overnight for low H&H   On IVF   /79, the rest of vitals stable    MEDICATIONS  (STANDING):  albuterol    0.083% 2.5 milliGRAM(s) Nebulizer every 6 hours  albuterol    90 MICROgram(s) HFA Inhaler 1 Puff(s) Inhalation every 4 hours  amLODIPine   Tablet 5 milliGRAM(s) Oral daily  atorvastatin 20 milliGRAM(s) Oral at bedtime  cloNIDine 0.2 milliGRAM(s) Oral two times a day  dextrose 5%. 1000 milliLiter(s) (50 mL/Hr) IV Continuous <Continuous>  dextrose 5%. 1000 milliLiter(s) (100 mL/Hr) IV Continuous <Continuous>  dextrose 50% Injectable 25 Gram(s) IV Push once  dextrose 50% Injectable 12.5 Gram(s) IV Push once  dextrose 50% Injectable 25 Gram(s) IV Push once  ferrous    sulfate 325 milliGRAM(s) Oral daily  folic acid 1 milliGRAM(s) Oral daily  glucagon  Injectable 1 milliGRAM(s) IntraMuscular once  insulin lispro (ADMELOG) corrective regimen sliding scale   SubCutaneous three times a day before meals  insulin lispro (ADMELOG) corrective regimen sliding scale   SubCutaneous at bedtime  levothyroxine 25 MICROGram(s) Oral daily  predniSONE   Tablet 40 milliGRAM(s) Oral daily    MEDICATIONS  (PRN):  acetaminophen     Tablet .. 650 milliGRAM(s) Oral every 6 hours PRN Temp greater or equal to 38C (100.4F), Mild Pain (1 - 3)  aluminum hydroxide/magnesium hydroxide/simethicone Suspension 30 milliLiter(s) Oral every 4 hours PRN Dyspepsia  dextrose Oral Gel 15 Gram(s) Oral once PRN Blood Glucose LESS THAN 70 milliGRAM(s)/deciliter  melatonin 3 milliGRAM(s) Oral at bedtime PRN Insomnia  ondansetron Injectable 4 milliGRAM(s) IV Push every 8 hours PRN Nausea and/or Vomiting      Allergies    No Known Allergies    Intolerances        REVIEW OF SYSTEMS:  CONSTITUTIONAL:   No fever, weight loss, or fatigue  No N/V/D      Vital Signs Last 24 Hrs  T(C): 36.7 (10 Apr 2023 05:24), Max: 36.7 (10 Apr 2023 05:24)  T(F): 98.1 (10 Apr 2023 05:24), Max: 98.1 (10 Apr 2023 05:24)  HR: 89 (10 Apr 2023 05:24) (64 - 113)  BP: 161/79 (10 Apr 2023 05:24) (110/67 - 161/79)  BP(mean): --  RR: 16 (10 Apr 2023 05:24) (15 - 18)  SpO2: 100% (10 Apr 2023 05:24) (99% - 100%)    Parameters below as of 10 Apr 2023 05:24  Patient On (Oxygen Delivery Method): nasal cannula  O2 Flow (L/min): 1      PHYSICAL EXAM:  GENERAL: NAD, well-groomed, well-developed  HEAD:  Atraumatic, Normocephalic  EYES: EOMI, PERRLA, conjunctiva and sclera clear  ENMT: Moist mucous membranes, Good dentition, No lesions; No tonsillar erythema, exudates, or enlargement  NECK: Supple, No JVD, Normal thyroid  NERVOUS SYSTEM:  Alert & Oriented X3, Good concentration; All 4 extremities mobile, no gross sensory deficits.   CHEST/LUNG: Clear to auscultation bilaterally; No rales, rhonchi, wheezing, or rubs  HEART: Regular rate and rhythm; No murmurs, rubs, or gallops  ABDOMEN: Soft, Nontender, Nondistended; Bowel sounds present  EXTREMITIES:  2+ Peripheral Pulses, No clubbing, cyanosis, or edema  LYMPH: No lymphadenopathy noted  SKIN: No rashes or lesions    LABS:                        6.6    6.94  )-----------( 252      ( 09 Apr 2023 19:15 )             20.8       Ca    8.6        09 Apr 2023 05:45          CAPILLARY BLOOD GLUCOSE      POCT Blood Glucose.: 215 mg/dL (09 Apr 2023 21:32)  POCT Blood Glucose.: 341 mg/dL (09 Apr 2023 16:36)  POCT Blood Glucose.: 318 mg/dL (09 Apr 2023 12:14)  POCT Blood Glucose.: 153 mg/dL (09 Apr 2023 07:43)      Assessment/Plan:  89F (Gujarati-speaking) with HTN, asthma, DM2, hypothyroidism, recent hospitalization in [place] for "trouble breathing", returned from [facility] 5 days ago, comes to the ED with SOB, diagnosed with asthma exacerbation secondary to RSV    #Asthma exacerbation  #RSV infection  # Anemia  #HTN  -CT chest non-contrast shows trace bilateral pleural effusions   - Continue PRN Nebulizer  - S/P PRBC overnight repeat CBC pending  - DC IVF in the setting of PO  diet and elevated BP  - Monitor VS Called by RN to evaluate pt with hypertesion    Patient is a 89y old  Female who presents with a chief complaint of Shortness of Breath (09 Apr 2023 12:43)      INTERVAL HPI/OVERNIGHT EVENTS:  Pt s/p PRBC overnight for low H&H   On IVF   /79, the rest of vitals stable    MEDICATIONS  (STANDING):  albuterol    0.083% 2.5 milliGRAM(s) Nebulizer every 6 hours  albuterol    90 MICROgram(s) HFA Inhaler 1 Puff(s) Inhalation every 4 hours  amLODIPine   Tablet 5 milliGRAM(s) Oral daily  atorvastatin 20 milliGRAM(s) Oral at bedtime  cloNIDine 0.2 milliGRAM(s) Oral two times a day  dextrose 5%. 1000 milliLiter(s) (50 mL/Hr) IV Continuous <Continuous>  dextrose 5%. 1000 milliLiter(s) (100 mL/Hr) IV Continuous <Continuous>  dextrose 50% Injectable 25 Gram(s) IV Push once  dextrose 50% Injectable 12.5 Gram(s) IV Push once  dextrose 50% Injectable 25 Gram(s) IV Push once  ferrous    sulfate 325 milliGRAM(s) Oral daily  folic acid 1 milliGRAM(s) Oral daily  glucagon  Injectable 1 milliGRAM(s) IntraMuscular once  insulin lispro (ADMELOG) corrective regimen sliding scale   SubCutaneous three times a day before meals  insulin lispro (ADMELOG) corrective regimen sliding scale   SubCutaneous at bedtime  levothyroxine 25 MICROGram(s) Oral daily  predniSONE   Tablet 40 milliGRAM(s) Oral daily    MEDICATIONS  (PRN):  acetaminophen     Tablet .. 650 milliGRAM(s) Oral every 6 hours PRN Temp greater or equal to 38C (100.4F), Mild Pain (1 - 3)  aluminum hydroxide/magnesium hydroxide/simethicone Suspension 30 milliLiter(s) Oral every 4 hours PRN Dyspepsia  dextrose Oral Gel 15 Gram(s) Oral once PRN Blood Glucose LESS THAN 70 milliGRAM(s)/deciliter  melatonin 3 milliGRAM(s) Oral at bedtime PRN Insomnia  ondansetron Injectable 4 milliGRAM(s) IV Push every 8 hours PRN Nausea and/or Vomiting      Allergies    No Known Allergies    Intolerances        REVIEW OF SYSTEMS:  CONSTITUTIONAL:   No fever, weight loss, or fatigue  No N/V/D      Vital Signs Last 24 Hrs  T(C): 36.7 (10 Apr 2023 05:24), Max: 36.7 (10 Apr 2023 05:24)  T(F): 98.1 (10 Apr 2023 05:24), Max: 98.1 (10 Apr 2023 05:24)  HR: 89 (10 Apr 2023 05:24) (64 - 113)  BP: 161/79 (10 Apr 2023 05:24) (110/67 - 161/79)  BP(mean): --  RR: 16 (10 Apr 2023 05:24) (15 - 18)  SpO2: 100% (10 Apr 2023 05:24) (99% - 100%)    Parameters below as of 10 Apr 2023 05:24  Patient On (Oxygen Delivery Method): nasal cannula  O2 Flow (L/min): 1      PHYSICAL EXAM:  GENERAL: NAD, well-groomed, well-developed  HEAD:  Atraumatic, Normocephalic  EYES: EOMI, PERRLA, conjunctiva and sclera clear  ENMT: Moist mucous membranes, Good dentition, No lesions; No tonsillar erythema, exudates, or enlargement  NECK: Supple, No JVD, Normal thyroid  NERVOUS SYSTEM:  Alert & Oriented X3, Good concentration; All 4 extremities mobile, no gross sensory deficits.   CHEST/LUNG: Clear to auscultation bilaterally; No rales, rhonchi, wheezing, or rubs  HEART: Regular rate and rhythm; No murmurs, rubs, or gallops  ABDOMEN: Soft, Nontender, Nondistended; Bowel sounds present  EXTREMITIES:  2+ Peripheral Pulses, No clubbing, cyanosis, or edema  LYMPH: No lymphadenopathy noted  SKIN: No rashes or lesions    LABS:                        6.6    6.94  )-----------( 252      ( 09 Apr 2023 19:15 )             20.8       Ca    8.6        09 Apr 2023 05:45          CAPILLARY BLOOD GLUCOSE      POCT Blood Glucose.: 215 mg/dL (09 Apr 2023 21:32)  POCT Blood Glucose.: 341 mg/dL (09 Apr 2023 16:36)  POCT Blood Glucose.: 318 mg/dL (09 Apr 2023 12:14)  POCT Blood Glucose.: 153 mg/dL (09 Apr 2023 07:43)      Assessment/Plan:  89F (Gujarati-speaking) with HTN, asthma, DM2, hypothyroidism, recent hospitalization in [place] for "trouble breathing", returned from [facility] 5 days ago, comes to the ED with SOB, diagnosed with asthma exacerbation secondary to RSV    #Asthma exacerbation  #RSV infection  # Anemia  #HTN  -CT chest non-contrast shows trace bilateral pleural effusions   - Continue PRN Nebulizer  - S/P PRBC overnight repeat CBC pending  - DC IVF in the setting of PO  diet and elevated BP  - Monitor VS

## 2023-04-10 NOTE — CONSULT NOTE ADULT - ASSESSMENT
imp    ELDERLY WOMAN WITH  SOB apparently new af, SIGNIFICANT ANEMIA AND RENAL INSUFFICIENCY    NO EVIDENCE OF AMI      SUGGEST    REPEAT EKG  AWAIT ECHO  NEED TO CONSIDER ANEMIA W/U AS BASELINE RENAL INDICES WOULD NOT EXPLAIN SUCH A SEVERE AND WORSENING ANEMIA
89F (Gujarati-speaking) with HTN, asthma, DM2, hypothyroidism, admitted with Asthma exacerbation from RSV.  H& H low and FOB positive S/P 1 Unit PRBC H &H improved.

## 2023-04-10 NOTE — DIETITIAN INITIAL EVALUATION ADULT - HEIGHT FOR BMI (CENTIMETERS)

## 2023-04-10 NOTE — PROGRESS NOTE ADULT - NS ATTEND AMEND GEN_ALL_CORE FT
Asthma exacerbation  - resolved, no more wheezing  - steroid taper    acute blood loss anemia secondary to suspected GI bleed  - hemoglobin trending up  - GI following

## 2023-04-10 NOTE — CONSULT NOTE ADULT - PROBLEM SELECTOR RECOMMENDATION 9
H&H trended low and FOB positive   Keep Active T&C  Monitor CBC  Transfuse if Symptomatic or Hb less than 7   Monitor Stool color  Will discuss EGD colonoscopy either in patient or out patient H&H trended low and FOB positive   Keep Active T&C  Monitor CBC  Transfuse if Symptomatic or Hb less than 7   Monitor Stool color  Will discuss EGD/colonoscopy either in patient or out patient

## 2023-04-10 NOTE — DIETITIAN INITIAL EVALUATION ADULT - NS FNS DIET ORDER
Diet, Regular:   Consistent Carbohydrate {Evening Snack}  DASH/TLC {Sodium & Cholesterol Restricted} (04-07-23 @ 19:07) [Active]

## 2023-04-10 NOTE — PROGRESS NOTE ADULT - ASSESSMENT
89F (Gujarati-speaking) with HTN, asthma, DM2, hypothyroidism, recent hospitalization in [place] for "trouble breathing", returned from [place] 5 days ago, comes to the ED with SOB, diagnosed with asthma exacerbation secondary to RSV    Asthma exacerbation  RSV infection  -Patient saturating 97% RA  -CT chest non-contrast shows trace bilateral pleural effusions   -Continue PO steroids for now, will taper down to 20mg starting tomorrow for 3 days  -Albuterol nebs q6h for now  -d-dimer 506, doppler US B/L LE negative for DVT  -considered V/Q scan (Cr elevated) as pt had tachycardia and was recently on plane ride from [place], but EKG and echo not significant for RV strain, doppler LE negative for DVT, tachycardia resolved as asthma exacerbation improving  -F/U blood cultures NGTD    #Anemia of chronic disease  #Positive FOBT  -Prior Hb 8-9  -Yesterday hemoglobin 7.3, then 6.6 overnight  -Patient received 1 unit PRBCs  -CBC this am hemoglobin 8.4, then repeat at 12pm 8.7  -Hold anticoagulation   -Continue to monitor  -FOBT positive   -Low iron and folate, Continue supplements  -Per family, patient has never had a colonoscopy, but has normally low H/H levels. Per PCP Dr. Merchant patient's hemoglobin normally around 9  -At this time family would like to pursue whatever measures are necessary to help her, understand risks of colonoscopy with older age as well as risks affiliated with blood transfusions   -Blood transfusion form completed and placed in patient's chart  -Consult GI    #New A-fib  -Was started on Eliquis, but with worsening anemia, FOBT +, now holding eliquis (will also stop ASA for now, no hx of CAD or stroke, risk of bleeding on ASA + Eliquis > benefits)  -Cardio consult  -Echo: LVEF 55-60%, moderate to severe aortic stenosis, grade 3 diastolic dysfunction, severe pulm htn  -troponin negative   -d-dimer positive, would consider V/Q as pt had tachycardia and was recently on plane ride from [place], but EKG and echo not significant for RV strain, doppler LE negative for DVT, tachycardia resolved as asthma exacerbation improving    #Essential HTN  -c/w Norvasc, Clonidine    #Hypothyroidism  -c/w Synthroid  -TSH reviewed wnl    #Hyperkalemia-resolved  -f/u repeat BMP, no EKG changes  -Patient received 2 doses of Kayexalate     #Hyponatremia  -Monitor BMP    #Bilateral leg swelling  -improved  -US negative DVT (recent plane ride)  -Could be from low albumen state  -does not appear to be in overt heart failure at this time despite elevated pro-BNP    #HLD  -c/w statin    #DM2  -hold oral meds (Metformin, Glimeperide)  -start ISS  -POCT glucose testing  -F/U A1C    #PREM vs CKD3  -Prior Cr 1.15 (2017) and 1.55 (2022)  -Unclear if we are dealing with PREM on CKD3, vs if this is the new baseline  -Hold Metformin  -Started IVF as pt appears dry, but discontinued a patient received 1 unit PRBC overnight and hx of diastolic dysfunction   -F/U BMP     #DVT ppx: patient anemic, positive FOBT, dropping H/H holding anticoagulation     Case d/w patient's PMD, Dr. Merchant, [phone], who is involved in her care and would appreciate any updates  4/10 Discussed case with patient's son Martin Santana, [phone]    FULL CODE at this time 89F (Gujarati-speaking) with HTN, asthma, DM2, hypothyroidism, recent hospitalization in [place] for "trouble breathing", returned from [place] 5 days ago, comes to the ED with SOB, diagnosed with asthma exacerbation secondary to RSV    Asthma exacerbation  RSV infection  -Patient saturating 97% RA  -CT chest non-contrast shows trace bilateral pleural effusions   -Continue PO steroids for now, will taper down to 20mg starting tomorrow for 3 days  -Albuterol nebs q6h for now  -d-dimer 506, doppler US B/L LE negative for DVT  -considered V/Q scan (Cr elevated) as pt had tachycardia and was recently on plane ride from [place], but EKG and echo not significant for RV strain, doppler LE negative for DVT, tachycardia resolved as asthma exacerbation improving  -F/U blood cultures NGTD    #Anemia of chronic disease  #Positive FOBT  -Prior Hb 8-9  -Yesterday hemoglobin 7.3, then 6.6 overnight  -Patient received 1 unit PRBCs  -CBC this am hemoglobin 8.4, then repeat at 12pm 8.7  -Hold anticoagulation   -Continue to monitor  -FOBT positive   -Low iron and folate, Continue supplements  -Per family, patient has never had a colonoscopy, but has normally low H/H levels. Per PCP Dr. Merchant patient's hemoglobin normally around 9  -At this time family would like to pursue whatever measures are necessary to help her, understand risks of colonoscopy with older age as well as risks affiliated with blood transfusions   -Blood transfusion form completed and placed in patient's chart  -Consult GI    #New A-fib  -Was started on Eliquis, but with worsening anemia, FOBT +, now holding eliquis (will also stop ASA for now, no hx of CAD or stroke, risk of bleeding on ASA + Eliquis > benefits)  -Cardio consult  -Echo: LVEF 55-60%, moderate to severe aortic stenosis, grade 3 diastolic dysfunction, severe pulm htn  -troponin negative   -d-dimer positive, would consider V/Q as pt had tachycardia and was recently on plane ride from [place], but EKG and echo not significant for RV strain, doppler LE negative for DVT, tachycardia resolved as asthma exacerbation improving    #Essential HTN  -c/w Norvasc, Clonidine    #Hypothyroidism  -c/w Synthroid  -TSH reviewed wnl    #Hyperkalemia-resolved  -f/u repeat BMP, no EKG changes  -Patient received 2 doses of Kayexalate     #Hyponatremia  -Monitor BMP    #Bilateral leg swelling  -improved  -US negative DVT (recent plane ride)  -Could be from low albumen state  -does not appear to be in overt heart failure at this time despite elevated pro-BNP    #HLD  -c/w statin    #DM2  -hold oral meds (Metformin, Glimeperide)  -start ISS  -POCT glucose testing  -F/U A1C    #PREM vs CKD3  -Prior Cr 1.15 (2017) and 1.55 (2022)  -Unclear if we are dealing with PREM on CKD3, vs if this is the new baseline  -Hold Metformin  -Started IVF as pt appears dry, but discontinued a patient received 1 unit PRBC overnight and hx of diastolic dysfunction   -F/U BMP     #DVT ppx: patient anemic, positive FOBT, dropping H/H holding anticoagulation     Case d/w patient's PMD, Dr. Merchant, [phone], who is involved in her care and would appreciate any updates  4/10 Discussed case with patient's son Martin Santana, [phone]    FULL CODE at this time 89F (Gujarati-speaking) with HTN, asthma, DM2, hypothyroidism, recent hospitalization in [place] for "trouble breathing", returned from [place] 5 days ago, comes to the ED with SOB, diagnosed with asthma exacerbation secondary to RSV    Asthma exacerbation  RSV infection  -Patient saturating 97% RA  -CT chest non-contrast shows trace bilateral pleural effusions   -Continue PO steroids for now, will taper down to 20mg starting tomorrow for 3 days  -Albuterol nebs q6h for now  -d-dimer 506, doppler US B/L LE negative for DVT  -considered V/Q scan (Cr elevated) as pt had tachycardia and was recently on plane ride from [place], but EKG and echo not significant for RV strain, doppler LE negative for DVT, tachycardia resolved as asthma exacerbation improving  -F/U blood cultures NGTD    #Anemia of chronic disease  #Positive FOBT  -Prior Hb 8-9  -Yesterday hemoglobin 7.3, then 6.6 overnight  -Patient received 1 unit PRBCs  -CBC this am hemoglobin 8.4, then repeat at 12pm 8.7  -Hold anticoagulation   -Continue to monitor  -FOBT positive   -Low iron and folate, Continue supplements  -Per family, patient has never had a colonoscopy, but has normally low H/H levels. Per PCP Dr. Merchant patient's hemoglobin normally around 9  -At this time family would like to pursue whatever measures are necessary to help her, understand risks of colonoscopy with older age as well as risks affiliated with blood transfusions   -Blood transfusion form completed and placed in patient's chart  -Consult GI    #New A-fib  -Was started on Eliquis, but with worsening anemia, FOBT +, now holding eliquis (will also stop ASA for now, no hx of CAD or stroke, risk of bleeding on ASA + Eliquis > benefits)  -Cardio consult  -Echo: LVEF 55-60%, moderate to severe aortic stenosis, grade 3 diastolic dysfunction, severe pulm htn  -troponin negative   -d-dimer positive, would consider V/Q as pt had tachycardia and was recently on plane ride from [place], but EKG and echo not significant for RV strain, doppler LE negative for DVT, tachycardia resolved as asthma exacerbation improving    #Essential HTN  -c/w Norvasc, Clonidine    #Hypothyroidism  -c/w Synthroid  -TSH reviewed wnl    #Hyperkalemia-resolved  -f/u repeat BMP, no EKG changes  -Patient received 2 doses of Kayexalate     #Hyponatremia  -Monitor BMP    #Bilateral leg swelling  -improved  -US negative DVT (recent plane ride)  -Could be from low albumen state  -does not appear to be in overt heart failure at this time despite elevated pro-BNP    #HLD  -c/w statin    #DM2  -hold oral meds (Metformin, Glimeperide)  -start ISS  -POCT glucose testing  -F/U A1C    #PREM vs CKD3  -Prior Cr 1.15 (2017) and 1.55 (2022)  -Unclear if we are dealing with PREM on CKD3, vs if this is the new baseline  -Hold Metformin  -Started IVF as pt appears dry, but discontinued a patient received 1 unit PRBC overnight and hx of diastolic dysfunction   -F/U BMP     #DVT ppx: patient anemic, positive FOBT, dropping H/H holding anticoagulation     Case d/w patient's PMD, Dr. Merchant, [phone], who is involved in her care and would appreciate any updates  4/10 Discussed case with patient's son Martin Santana, [phone]    FULL CODE at this time 89F (Gujarati-speaking) with HTN, asthma, DM2, hypothyroidism, recent hospitalization in [place] for "trouble breathing", returned from [place] 5 days ago, comes to the ED with SOB, diagnosed with asthma exacerbation secondary to RSV    Asthma exacerbation  RSV infection  -Patient saturating 97% RA  -CT chest non-contrast shows trace bilateral pleural effusions   -Continue PO steroids for now, will taper down to 20mg starting tomorrow for 3 days  -Albuterol nebs q6h for now  -d-dimer 506, doppler US B/L LE negative for DVT  -considered V/Q scan (Cr elevated) as pt had tachycardia and was recently on plane ride from [place], but EKG and echo not significant for RV strain, doppler LE negative for DVT, tachycardia resolved as asthma exacerbation improving  -F/U blood cultures NGTD    #Anemia of chronic disease  #Positive FOBT  -Prior Hb 8-9  -Yesterday hemoglobin 7.3, then 6.6 overnight  -Patient received 1 unit PRBCs  -CBC this am hemoglobin 8.4, then repeat at 12pm 8.7  -Hold anticoagulation   -Continue to monitor  -FOBT positive   -Low iron and folate, Continue supplements  -Per family, patient has never had a colonoscopy, but has normally low H/H levels. Per PCP Dr. Merchant patient's hemoglobin normally around 9  -At this time family would like to pursue whatever measures are necessary to help her, understand risks of colonoscopy with older age as well as risks affiliated with blood transfusions   -Blood transfusion form completed and placed in patient's chart  -Consult GI    #New A-fib  -Was started on Eliquis, but with worsening anemia, FOBT +, now holding eliquis (will also stop ASA for now, no hx of CAD or stroke, risk of bleeding on ASA + Eliquis > benefits)  -Cardio consult  -Echo: LVEF 55-60%, moderate to severe aortic stenosis, grade 3 diastolic dysfunction, severe pulm htn  -troponin negative   -d-dimer positive, would consider V/Q as pt had tachycardia and was recently on plane ride from [place], but EKG and echo not significant for RV strain, doppler LE negative for DVT, tachycardia resolved as asthma exacerbation improving    #Essential HTN  -c/w Norvasc, Clonidine    #Hypothyroidism  -c/w Synthroid  -TSH reviewed wnl    #Hyperkalemia-resolved  -f/u repeat BMP, no EKG changes  -Patient received 2 doses of Kayexalate     #Hyponatremia  -Monitor BMP    #Bilateral leg swelling  -improved  -US negative DVT (recent plane ride)  -Could be from low albumen state  -does not appear to be in overt heart failure at this time despite elevated pro-BNP    #HLD  -c/w statin    #DM2  -hold oral meds (Metformin, Glimeperide)  -start ISS  -POCT glucose testing  -F/U A1C    #PREM vs CKD3  -Prior Cr 1.15 (2017) and 1.55 (2022)  -Unclear if we are dealing with PREM on CKD3, vs if this is the new baseline  -Hold Metformin  -Started IVF as pt appears dry, but discontinued a patient received 1 unit PRBC overnight and hx of diastolic dysfunction   -F/U BMP     #DVT ppx: patient anemic, positive FOBT, dropping H/H holding anticoagulation     Dispo: pending anemia and GI consult     Case d/w patient's PMD, Dr. Merchant, [phone], who is involved in her care and would appreciate any updates  4/10 updated daughter in law at bedside   Discussed case with patient's son Martin Santana, [phone]    FULL CODE at this time 89F (Gujarati-speaking) with HTN, asthma, DM2, hypothyroidism, recent hospitalization in [place] for "trouble breathing", returned from [place] 5 days ago, comes to the ED with SOB, diagnosed with asthma exacerbation secondary to RSV    Asthma exacerbation  RSV infection  -Patient saturating 97% RA  -CT chest non-contrast shows trace bilateral pleural effusions   -Continue PO steroids for now, will taper down to 20mg starting tomorrow for 3 days  -Albuterol nebs q6h for now  -d-dimer 506, doppler US B/L LE negative for DVT  -considered V/Q scan (Cr elevated) as pt had tachycardia and was recently on plane ride from [place], but EKG and echo not significant for RV strain, doppler LE negative for DVT, tachycardia resolved as asthma exacerbation improving  -F/U blood cultures NGTD    #Anemia of chronic disease  #Positive FOBT  -Prior Hb 8-9  -Yesterday hemoglobin 7.3, then 6.6 overnight  -Patient received 1 unit PRBCs  -CBC this am hemoglobin 8.4, then repeat at 12pm 8.7  -Hold anticoagulation   -Continue to monitor  -FOBT positive   -Low iron and folate, Continue supplements  -Per family, patient has never had a colonoscopy, but has normally low H/H levels. Per PCP Dr. Merchant patient's hemoglobin normally around 9  -At this time family would like to pursue whatever measures are necessary to help her, understand risks of colonoscopy with older age as well as risks affiliated with blood transfusions   -Blood transfusion form completed and placed in patient's chart  -Consult GI    #New A-fib  -Was started on Eliquis, but with worsening anemia, FOBT +, now holding eliquis (will also stop ASA for now, no hx of CAD or stroke, risk of bleeding on ASA + Eliquis > benefits)  -Cardio consult  -Echo: LVEF 55-60%, moderate to severe aortic stenosis, grade 3 diastolic dysfunction, severe pulm htn  -troponin negative   -d-dimer positive, would consider V/Q as pt had tachycardia and was recently on plane ride from [place], but EKG and echo not significant for RV strain, doppler LE negative for DVT, tachycardia resolved as asthma exacerbation improving    #Essential HTN  -c/w Norvasc, Clonidine    #Hypothyroidism  -c/w Synthroid  -TSH reviewed wnl    #Hyperkalemia-resolved  -f/u repeat BMP, no EKG changes  -Patient received 2 doses of Kayexalate     #Hyponatremia  -Monitor BMP    #Bilateral leg swelling  -improved  -US negative DVT (recent plane ride)  -Could be from low albumen state  -does not appear to be in overt heart failure at this time despite elevated pro-BNP    #HLD  -c/w statin    #DM2  -hold oral meds (Metformin, Glimeperide)  -start ISS  -POCT glucose testing  -F/U A1C    #PREM vs CKD3  -Prior Cr 1.15 (2017) and 1.55 (2022)  -Unclear if we are dealing with PREM on CKD3, vs if this is the new baseline  -Hold Metformin  -Started IVF as pt appears dry, but discontinued a patient received 1 unit PRBC overnight and hx of diastolic dysfunction   -F/U BMP     #DVT ppx: patient anemic, positive FOBT, dropping H/H holding anticoagulation     Dispo: pending anemia and GI consult     Case d/w patient's PMD, Dr. Merchant, [phone], who is involved in her care and would appreciate any updates  4/10 updated daughter in law at bedside   Discussed case with patient's son Martin Santana, [phone]    FULL CODE at this time 89F (Gujarati-speaking) with HTN, asthma, DM2, hypothyroidism, recent hospitalization in [place] for "trouble breathing", returned from [place] 5 days ago, comes to the ED with SOB, diagnosed with asthma exacerbation secondary to RSV    Asthma exacerbation  RSV infection  -Patient saturating 97% RA  -CT chest non-contrast shows trace bilateral pleural effusions   -Continue PO steroids for now, will taper down to 20mg starting tomorrow for 3 days  -Albuterol nebs q6h for now  -d-dimer 506, doppler US B/L LE negative for DVT  -considered V/Q scan (Cr elevated) as pt had tachycardia and was recently on plane ride from [place], but EKG and echo not significant for RV strain, doppler LE negative for DVT, tachycardia resolved as asthma exacerbation improving  -F/U blood cultures NGTD    #Anemia of chronic disease  #Positive FOBT  -Prior Hb 8-9  -Yesterday hemoglobin 7.3, then 6.6 overnight  -Patient received 1 unit PRBCs  -CBC this am hemoglobin 8.4, then repeat at 12pm 8.7  -Hold anticoagulation   -Continue to monitor  -FOBT positive   -Low iron and folate, Continue supplements  -Per family, patient has never had a colonoscopy, but has normally low H/H levels. Per PCP Dr. Merchant patient's hemoglobin normally around 9  -At this time family would like to pursue whatever measures are necessary to help her, understand risks of colonoscopy with older age as well as risks affiliated with blood transfusions   -Blood transfusion form completed and placed in patient's chart  -Consult GI    #New A-fib  -Was started on Eliquis, but with worsening anemia, FOBT +, now holding eliquis (will also stop ASA for now, no hx of CAD or stroke, risk of bleeding on ASA + Eliquis > benefits)  -Cardio consult  -Echo: LVEF 55-60%, moderate to severe aortic stenosis, grade 3 diastolic dysfunction, severe pulm htn  -troponin negative   -d-dimer positive, would consider V/Q as pt had tachycardia and was recently on plane ride from [place], but EKG and echo not significant for RV strain, doppler LE negative for DVT, tachycardia resolved as asthma exacerbation improving    #Essential HTN  -c/w Norvasc, Clonidine    #Hypothyroidism  -c/w Synthroid  -TSH reviewed wnl    #Hyperkalemia-resolved  -f/u repeat BMP, no EKG changes  -Patient received 2 doses of Kayexalate     #Hyponatremia  -Monitor BMP    #Bilateral leg swelling  -improved  -US negative DVT (recent plane ride)  -Could be from low albumen state  -does not appear to be in overt heart failure at this time despite elevated pro-BNP    #HLD  -c/w statin    #DM2  -hold oral meds (Metformin, Glimeperide)  -start ISS  -POCT glucose testing  -F/U A1C    #PREM vs CKD3  -Prior Cr 1.15 (2017) and 1.55 (2022)  -Unclear if we are dealing with PREM on CKD3, vs if this is the new baseline  -Hold Metformin  -Started IVF as pt appears dry, but discontinued a patient received 1 unit PRBC overnight and hx of diastolic dysfunction   -F/U BMP     #DVT ppx: patient anemic, positive FOBT, dropping H/H holding anticoagulation     Dispo: pending anemia and GI consult     Case d/w patient's PMD, Dr. Merchant, [phone], who is involved in her care and would appreciate any updates  4/10 updated daughter in law at bedside   Discussed case with patient's son Martin Santana, [phone]    FULL CODE at this time 89F (Gujarati-speaking) with HTN, asthma, DM2, hypothyroidism, recent hospitalization in [place] for "trouble breathing", returned from [place] 5 days ago, comes to the ED with SOB, diagnosed with asthma exacerbation secondary to RSV    Asthma exacerbation  RSV infection  -Patient saturating 97% RA  -CT chest non-contrast shows trace bilateral pleural effusions   -Continue PO steroids for now, will taper down to 20mg starting tomorrow for 3 days  -Albuterol nebs q6h for now  -d-dimer 506, doppler US B/L LE negative for DVT  -considered V/Q scan (Cr elevated) as pt had tachycardia and was recently on plane ride from [place], but EKG and echo not significant for RV strain, doppler LE negative for DVT, tachycardia resolved as asthma exacerbation improving  -F/U blood cultures NGTD    #Anemia of chronic disease  #Positive FOBT  -Prior Hb 8-9  -Yesterday hemoglobin 7.3, then 6.6 overnight  - trend up now  -Patient received 1 unit PRBCs  -CBC this am hemoglobin 8.4, then repeat at 12pm 8.7  -Hold anticoagulation   -Continue to monitor  -FOBT positive   -Low iron and folate, Continue supplements  -Per family, patient has never had a colonoscopy, but has normally low H/H levels. Per PCP Dr. Merchant patient's hemoglobin normally around 9  -At this time family would like to pursue whatever measures are necessary to help her, understand risks of colonoscopy with older age as well as risks affiliated with blood transfusions   -Blood transfusion form completed and placed in patient's chart  -Consult GI    #New A-fib  -Was started on Eliquis, but with worsening anemia, FOBT +, now holding eliquis (will also stop ASA for now, no hx of CAD or stroke, risk of bleeding on ASA + Eliquis > benefits)  -Cardio consult  -Echo: LVEF 55-60%, moderate to severe aortic stenosis, grade 3 diastolic dysfunction, severe pulm htn  -troponin negative   -d-dimer positive, would consider V/Q as pt had tachycardia and was recently on plane ride from [place], but EKG and echo not significant for RV strain, doppler LE negative for DVT, tachycardia resolved as asthma exacerbation improving    #Essential HTN  -c/w Norvasc, Clonidine    #Hypothyroidism  -c/w Synthroid  -TSH reviewed wnl    #Hyperkalemia-resolved  -f/u repeat BMP, no EKG changes  -Patient received 2 doses of Kayexalate     #Hyponatremia  -Monitor BMP    #Bilateral leg swelling  -improved  -US negative DVT (recent plane ride)  -Could be from low albumen state  -does not appear to be in overt heart failure at this time despite elevated pro-BNP    #HLD  -c/w statin    #DM2  -hold oral meds (Metformin, Glimeperide)  -start ISS  -POCT glucose testing  -F/U A1C    #PREM vs CKD3  -Prior Cr 1.15 (2017) and 1.55 (2022)  -Unclear if we are dealing with PREM on CKD3, vs if this is the new baseline  -Hold Metformin  -Started IVF as pt appears dry, but discontinued a patient received 1 unit PRBC overnight and hx of diastolic dysfunction   -F/U BMP     #DVT ppx: patient anemic, positive FOBT, dropping H/H holding anticoagulation     Dispo: pending anemia and GI consult     Case d/w patient's PMD, Dr. Merchant, [phone], who is involved in her care and would appreciate any updates  4/10 updated daughter in law at bedside   Discussed case with patient's son Martin Santana, [phone]    FULL CODE at this time 89F (Gujarati-speaking) with HTN, asthma, DM2, hypothyroidism, recent hospitalization in [place] for "trouble breathing", returned from [place] 5 days ago, comes to the ED with SOB, diagnosed with asthma exacerbation secondary to RSV    Asthma exacerbation  RSV infection  -Patient saturating 97% RA  -CT chest non-contrast shows trace bilateral pleural effusions   -Continue PO steroids for now, will taper down to 20mg starting tomorrow for 3 days  -Albuterol nebs q6h for now  -d-dimer 506, doppler US B/L LE negative for DVT  -considered V/Q scan (Cr elevated) as pt had tachycardia and was recently on plane ride from [place], but EKG and echo not significant for RV strain, doppler LE negative for DVT, tachycardia resolved as asthma exacerbation improving  -F/U blood cultures NGTD    #Anemia of chronic disease  #Positive FOBT  -Prior Hb 8-9  -Yesterday hemoglobin 7.3, then 6.6 overnight  - trend up now  -Patient received 1 unit PRBCs  -CBC this am hemoglobin 8.4, then repeat at 12pm 8.7  -Hold anticoagulation   -Continue to monitor  -FOBT positive   -Low iron and folate, Continue supplements  -Per family, patient has never had a colonoscopy, but has normally low H/H levels. Per PCP Dr. Merchant patient's hemoglobin normally around 9  -At this time family would like to pursue whatever measures are necessary to help her, understand risks of colonoscopy with older age as well as risks affiliated with blood transfusions   -Blood transfusion form completed and placed in patient's chart  -Consult GI    #New A-fib  -Was started on Eliquis, but with worsening anemia, FOBT +, now holding eliquis (will also stop ASA for now, no hx of CAD or stroke, risk of bleeding on ASA + Eliquis > benefits)  -Cardio consult  -Echo: LVEF 55-60%, moderate to severe aortic stenosis, grade 3 diastolic dysfunction, severe pulm htn  -troponin negative   -d-dimer positive, would consider V/Q as pt had tachycardia and was recently on plane ride from [place], but EKG and echo not significant for RV strain, doppler LE negative for DVT, tachycardia resolved as asthma exacerbation improving    #Essential HTN  -c/w Norvasc, Clonidine    #Hypothyroidism  -c/w Synthroid  -TSH reviewed wnl    #Hyperkalemia-resolved  -f/u repeat BMP, no EKG changes  -Patient received 2 doses of Kayexalate     #Hyponatremia  -Monitor BMP    #Bilateral leg swelling  -improved  -US negative DVT (recent plane ride)  -Could be from low albumen state  -does not appear to be in overt heart failure at this time despite elevated pro-BNP    #HLD  -c/w statin    #DM2  -hold oral meds (Metformin, Glimeperide)  -start ISS  -POCT glucose testing  -F/U A1C    #PREM vs CKD3  -Prior Cr 1.15 (2017) and 1.55 (2022)  -Unclear if we are dealing with PREM on CKD3, vs if this is the new baseline  -Hold Metformin  -Started IVF as pt appears dry, but discontinued a patient received 1 unit PRBC overnight and hx of diastolic dysfunction   -F/U BMP     #DVT ppx: patient anemic, positive FOBT, dropping H/H holding anticoagulation     Dispo: pending anemia and GI consult     Case d/w patient's PMD, Dr. Merchant, [phone], who is involved in her care and would appreciate any updates  4/10 updated daughter in law at bedside   Discussed case with patient's son Martin Santana, [phone]    FULL CODE at this time 89F (Gujarati-speaking) with HTN, asthma, DM2, hypothyroidism, recent hospitalization in [place] for "trouble breathing", returned from [place] 5 days ago, comes to the ED with SOB, diagnosed with asthma exacerbation secondary to RSV    Asthma exacerbation  RSV infection  -Patient saturating 97% RA  -CT chest non-contrast shows trace bilateral pleural effusions   -Continue PO steroids for now, will taper down to 20mg starting tomorrow for 3 days  -Albuterol nebs q6h for now  -d-dimer 506, doppler US B/L LE negative for DVT  -considered V/Q scan (Cr elevated) as pt had tachycardia and was recently on plane ride from [place], but EKG and echo not significant for RV strain, doppler LE negative for DVT, tachycardia resolved as asthma exacerbation improving  -F/U blood cultures NGTD    #Anemia of chronic disease  #Positive FOBT  -Prior Hb 8-9  -Yesterday hemoglobin 7.3, then 6.6 overnight  - trend up now  -Patient received 1 unit PRBCs  -CBC this am hemoglobin 8.4, then repeat at 12pm 8.7  -Hold anticoagulation   -Continue to monitor  -FOBT positive   -Low iron and folate, Continue supplements  -Per family, patient has never had a colonoscopy, but has normally low H/H levels. Per PCP Dr. Merchant patient's hemoglobin normally around 9  -At this time family would like to pursue whatever measures are necessary to help her, understand risks of colonoscopy with older age as well as risks affiliated with blood transfusions   -Blood transfusion form completed and placed in patient's chart  -Consult GI    #New A-fib  -Was started on Eliquis, but with worsening anemia, FOBT +, now holding eliquis (will also stop ASA for now, no hx of CAD or stroke, risk of bleeding on ASA + Eliquis > benefits)  -Cardio consult  -Echo: LVEF 55-60%, moderate to severe aortic stenosis, grade 3 diastolic dysfunction, severe pulm htn  -troponin negative   -d-dimer positive, would consider V/Q as pt had tachycardia and was recently on plane ride from [place], but EKG and echo not significant for RV strain, doppler LE negative for DVT, tachycardia resolved as asthma exacerbation improving    #Essential HTN  -c/w Norvasc, Clonidine    #Hypothyroidism  -c/w Synthroid  -TSH reviewed wnl    #Hyperkalemia-resolved  -f/u repeat BMP, no EKG changes  -Patient received 2 doses of Kayexalate     #Hyponatremia  -Monitor BMP    #Bilateral leg swelling  -improved  -US negative DVT (recent plane ride)  -Could be from low albumen state  -does not appear to be in overt heart failure at this time despite elevated pro-BNP    #HLD  -c/w statin    #DM2  -hold oral meds (Metformin, Glimeperide)  -start ISS  -POCT glucose testing  -F/U A1C    #PREM vs CKD3  -Prior Cr 1.15 (2017) and 1.55 (2022)  -Unclear if we are dealing with PREM on CKD3, vs if this is the new baseline  -Hold Metformin  -Started IVF as pt appears dry, but discontinued a patient received 1 unit PRBC overnight and hx of diastolic dysfunction   -F/U BMP     #DVT ppx: patient anemic, positive FOBT, dropping H/H holding anticoagulation     Dispo: pending anemia and GI consult     Case d/w patient's PMD, Dr. Merchant, [phone], who is involved in her care and would appreciate any updates  4/10 updated daughter in law at bedside   Discussed case with patient's son Martin Santana, [phone]    FULL CODE at this time

## 2023-04-10 NOTE — DIETITIAN INITIAL EVALUATION ADULT - OTHER INFO
89F (Gujarati-speaking) with HTN, asthma, DM2, hypothyroidism, recent hospitalization in [place] for "trouble breathing", returned from [place] 5 days ago, comes to the ED with SOB, diagnosed with asthma exacerbation secondary to RSV. Patient with suboptimal PO intakes at this time. Recommend Ensure Plus High Protein 8oz PO BID (Provides 700kcal & 40grams of Protein) to enhance PO intakes and optimize nutritional status. Hx of T2DM noted. POCT & insulin regimen reviewed. Prednisone noted. Recommend obtaining A1C. CCHO + DASH/TLC diet.  89F (Gujarati-speaking) with HTN, asthma, DM2, hypothyroidism, recent hospitalization in [place] for "trouble breathing", returned from [place] 5 days ago, comes to the ED with SOB, diagnosed with asthma exacerbation secondary to RSV. Patient with suboptimal PO intakes at this time. Recommend Glucerna 8oz PO BID (Provides 440kcal-20grams of Protein)  to enhance PO intakes and optimize nutritional status. Hx of T2DM noted. POCT & insulin regimen reviewed. Prednisone noted. Recommend obtaining A1C. CCHO + DASH/TLC diet.  89F (Gujarati-speaking) with HTN, asthma, DM2, hypothyroidism, recent hospitalization in [place] for "trouble breathing", returned from [place] 5 days ago, comes to the ED with SOB, diagnosed with asthma exacerbation secondary to RSV. Patient with suboptimal PO intakes at this time. Recommend Glucerna 8oz PO BID (Provides 440kcal-20grams of Protein)  to enhance PO intakes and optimize nutritional status. Hx of T2DM noted. POCT & insulin regimen reviewed. Prednisone noted. Recommend obtaining A1C. CCHO + DASH/TLC diet.

## 2023-04-10 NOTE — GOALS OF CARE CONVERSATION - ADVANCED CARE PLANNING - CONVERSATION DETAILS
Met with patient at bedside. Pt. from home. Had recently returned from [place]. She speaks Gujarati, MURPHY Castillo interpreting basic hx.  Through DIL I established that the patient is A&Ox3. DIL stated patient is a retired  (in [place]). I showed daughter and patient ACP CPR video in [place], because DIL stated the patient would understand it. Afterwards I used Gujarati  [Akti #[id number]] to continue GOC discussion. Through Gujarati , Patient responded that she would not want attempted resuscitation or intubation in the event of cardiac arrest. She stated this to . I explained to her and DIL that the form would go home with her. If she changed her mind about the GOC she could tell her/a Doctor and form could be voided or redone. They expressed understanding. I also confirmed that patient would want continued tx. with IV fluids and abx, and be sent to hospital if she became sick again. They agreed and understood. We deferred discussion about feeding tube. Pt's oral intake is adequate at this time. TIFFANIE Oropeza made aware. Met with patient at bedside. Pt. from home. Had recently returned from [place]. She speaks Gujarati, MURPHY Castillo interpreting basic hx.  Through DIL I established that the patient is A&Ox3. DIL stated patient is a retired  (in [place]). I showed daughter and patient ACP CPR video in [place], because DIL stated the patient would understand it. Afterwards I used Gujarati  [Akti #[id number]] to continue GOC discussion. Through Gujarati , Patient responded that she would not want attempted resuscitation or intubation in the event of cardiac arrest. She stated this to . I explained to her and DIL that the form would go home with her. If she changed her mind about the GOC she could tell her/a Doctor and form could be voided or redone. They expressed understanding. I also confirmed that patient would want continued tx. with IV fluids and abx, and be sent to hospital if she became sick again. They agreed and understood. We deferred discussion about feeding tube. Pt's oral intake is adequate at this time. TIFFANIE Oropeza made aware. Met with patient at bedside. Pt. from home. Had recently returned from [place]. She speaks Gujarati, MURPHY Castillo interpreting basic hx.  Through DIL I established that the patient is A&Ox3. DIL stated patient is a retired  (in [place]). I showed daughter and patient ACP CPR video in [place], because DIL stated the patient would understand it. Afterwards I used Gujarati  [Akti #[id number]] to continue GOC discussion. Through Gujarati , Patient responded that she would not want attempted resuscitation or intubation in the event of cardiac arrest. She stated this to . I explained to her and DIL that the form would go home with her. If she changed her mind about the GOC she could tell her/a Doctor and form could be voided or redone. They expressed understanding. I also confirmed that patient would want continued tx. with IV fluids and abx, and be sent to hospital if she became sick again. They agreed and understood. We deferred discussion about feeding tube. Pt's oral intake is adequate at this time. TIFFANIE Oropeza made aware.

## 2023-04-10 NOTE — CONSULT NOTE ADULT - SUBJECTIVE AND OBJECTIVE BOX
INTERVAL HPI/OVERNIGHT EVENTS:  HPI:  Unable to obtain history from patient due to lethargy. All history obtained from patient's son    Sunita  phone used, ID # [id number]    Despite use of phone, patient has poor hearing and is tired - she is not able to communicate with  phone to provide history. All hx obtain from chart review and son, Martin Santana,     89F (Gujarati-speaking) with HTN, asthma, DM2, hypothyroidism, recent hospitalization in [place] for "trouble breathing", returned from [facility] 5 days ago, comes to the ED with SOB, diagnosed with asthma exacerbation secondary to RSV. When patient hospitalized in [place], no official diagnosis was provided to family.  (07 Apr 2023 17:57)    G I Consultation called for FOB positive. With the [facility]   # [id number] Name Charley  Patient seen and examined at bed side. She is poor historian, possible poor hearing, patient stating she is constipated last bowel movement 2 days before, no family at bed side. As per RN she tolerated meal, no bowel movement. S/P 1 unite PRBC last night.     MEDICATIONS  (STANDING):  albuterol    0.083% 2.5 milliGRAM(s) Nebulizer every 6 hours  albuterol    90 MICROgram(s) HFA Inhaler 1 Puff(s) Inhalation every 4 hours  amLODIPine   Tablet 5 milliGRAM(s) Oral daily  atorvastatin 20 milliGRAM(s) Oral at bedtime  cloNIDine 0.2 milliGRAM(s) Oral two times a day  dextrose 5%. 1000 milliLiter(s) (50 mL/Hr) IV Continuous <Continuous>  dextrose 5%. 1000 milliLiter(s) (100 mL/Hr) IV Continuous <Continuous>  dextrose 50% Injectable 25 Gram(s) IV Push once  dextrose 50% Injectable 12.5 Gram(s) IV Push once  dextrose 50% Injectable 25 Gram(s) IV Push once  ferrous    sulfate 325 milliGRAM(s) Oral daily  folic acid 1 milliGRAM(s) Oral daily  glucagon  Injectable 1 milliGRAM(s) IntraMuscular once  insulin lispro (ADMELOG) corrective regimen sliding scale   SubCutaneous three times a day before meals  insulin lispro (ADMELOG) corrective regimen sliding scale   SubCutaneous at bedtime  levothyroxine 25 MICROGram(s) Oral daily  pantoprazole   Suspension 40 milliGRAM(s) Oral daily  senna 2 Tablet(s) Oral at bedtime    MEDICATIONS  (PRN):  acetaminophen     Tablet .. 650 milliGRAM(s) Oral every 6 hours PRN Temp greater or equal to 38C (100.4F), Mild Pain (1 - 3)  aluminum hydroxide/magnesium hydroxide/simethicone Suspension 30 milliLiter(s) Oral every 4 hours PRN Dyspepsia  dextrose Oral Gel 15 Gram(s) Oral once PRN Blood Glucose LESS THAN 70 milliGRAM(s)/deciliter  melatonin 3 milliGRAM(s) Oral at bedtime PRN Insomnia  ondansetron Injectable 4 milliGRAM(s) IV Push every 8 hours PRN Nausea and/or Vomiting  polyethylene glycol 3350 17 Gram(s) Oral daily PRN Constipation      Allergies    No Known Allergies    Intolerances        PAST MEDICAL & SURGICAL HISTORY:  Moderate asthma      Hypertension        	    PHYSICAL EXAM:   Vital Signs:  Vital Signs Last 24 Hrs  T(C): 36.3 (10 Apr 2023 15:39), Max: 36.7 (10 Apr 2023 05:24)  T(F): 97.4 (10 Apr 2023 15:39), Max: 98.1 (10 Apr 2023 05:24)  HR: 76 (10 Apr 2023 15:39) (64 - 98)  BP: 142/75 (10 Apr 2023 15:39) (110/67 - 161/79)  BP(mean): --  RR: 19 (10 Apr 2023 15:39) (15 - 19)  SpO2: 100% (10 Apr 2023 15:39) (98% - 100%)    Parameters below as of 10 Apr 2023 15:39  Patient On (Oxygen Delivery Method): Nebulizer      Daily     Daily I&O's Summary    09 Apr 2023 07:01  -  10 Apr 2023 07:00  --------------------------------------------------------  IN: 1460 mL / OUT: 400 mL / NET: 1060 mL        GENERAL:  Appears stated age, no distress  HEENT:  NC/AT,  conjunctivae clear and pink  CHEST:  Full & symmetric excursion, no increased effort   HEART:  Regular rhythm, S1, S2  ABDOMEN:  Soft, non-tender, non-distended, normoactive bowel sounds  EXTEREMITIES:  no cyanosis, clubbing or edema  SKIN:  No rash/warm/dry  NEURO:  Alert       LABS:                        8.7    9.37  )-----------( 258      ( 10 Apr 2023 12:45 )             27.3     04-10    131<L>  |  95<L>  |  45<H>  ----------------------------<  154<H>  5.3   |  29  |  1.99<H>    Ca    8.2<L>      10 Apr 2023 06:11          amylase   lipase  RADIOLOGY & ADDITIONAL TESTS:   INTERVAL HPI/OVERNIGHT EVENTS:  HPI:  Unable to obtain history from patient due to lethargy. All history obtained from patient's son    Sunita  phone used, ID # [id number]    Despite use of phone, patient has poor hearing and is tired - she is not able to communicate with  phone to provide history. All hx obtain from chart review and son, Martin Santana,     89F (Gujarati-speaking) with HTN, asthma, DM2, hypothyroidism, recent hospitalization in [place] for "trouble breathing", returned from [facility] 5 days ago, comes to the ED with SOB, diagnosed with asthma exacerbation secondary to RSV. When patient hospitalized in [place], no official diagnosis was provided to family.  (07 Apr 2023 17:57)    G I Consultation called for FOB positive. With the [facility]   # [id number] Name Charley  Patient seen and examined at bed side. She is poor historian, possible poor hearing, patient stating she is constipated last bowel movement 2 days before, no family at bed side. As per RN she tolerated meal, no bowel movement. S/P 1 unite PRBC last night.     MEDICATIONS  (STANDING):  albuterol    0.083% 2.5 milliGRAM(s) Nebulizer every 6 hours  albuterol    90 MICROgram(s) HFA Inhaler 1 Puff(s) Inhalation every 4 hours  amLODIPine   Tablet 5 milliGRAM(s) Oral daily  atorvastatin 20 milliGRAM(s) Oral at bedtime  cloNIDine 0.2 milliGRAM(s) Oral two times a day  dextrose 5%. 1000 milliLiter(s) (50 mL/Hr) IV Continuous <Continuous>  dextrose 5%. 1000 milliLiter(s) (100 mL/Hr) IV Continuous <Continuous>  dextrose 50% Injectable 25 Gram(s) IV Push once  dextrose 50% Injectable 12.5 Gram(s) IV Push once  dextrose 50% Injectable 25 Gram(s) IV Push once  ferrous    sulfate 325 milliGRAM(s) Oral daily  folic acid 1 milliGRAM(s) Oral daily  glucagon  Injectable 1 milliGRAM(s) IntraMuscular once  insulin lispro (ADMELOG) corrective regimen sliding scale   SubCutaneous three times a day before meals  insulin lispro (ADMELOG) corrective regimen sliding scale   SubCutaneous at bedtime  levothyroxine 25 MICROGram(s) Oral daily  pantoprazole   Suspension 40 milliGRAM(s) Oral daily  senna 2 Tablet(s) Oral at bedtime    MEDICATIONS  (PRN):  acetaminophen     Tablet .. 650 milliGRAM(s) Oral every 6 hours PRN Temp greater or equal to 38C (100.4F), Mild Pain (1 - 3)  aluminum hydroxide/magnesium hydroxide/simethicone Suspension 30 milliLiter(s) Oral every 4 hours PRN Dyspepsia  dextrose Oral Gel 15 Gram(s) Oral once PRN Blood Glucose LESS THAN 70 milliGRAM(s)/deciliter  melatonin 3 milliGRAM(s) Oral at bedtime PRN Insomnia  ondansetron Injectable 4 milliGRAM(s) IV Push every 8 hours PRN Nausea and/or Vomiting  polyethylene glycol 3350 17 Gram(s) Oral daily PRN Constipation      Allergies    No Known Allergies    Intolerances        PAST MEDICAL & SURGICAL HISTORY:  Moderate asthma      Hypertension        	    PHYSICAL EXAM:   Vital Signs:  Vital Signs Last 24 Hrs  T(C): 36.3 (10 Apr 2023 15:39), Max: 36.7 (10 Apr 2023 05:24)  T(F): 97.4 (10 Apr 2023 15:39), Max: 98.1 (10 Apr 2023 05:24)  HR: 76 (10 Apr 2023 15:39) (64 - 98)  BP: 142/75 (10 Apr 2023 15:39) (110/67 - 161/79)  BP(mean): --  RR: 19 (10 Apr 2023 15:39) (15 - 19)  SpO2: 100% (10 Apr 2023 15:39) (98% - 100%)    Parameters below as of 10 Apr 2023 15:39  Patient On (Oxygen Delivery Method): Nebulizer      Daily     Daily I&O's Summary    09 Apr 2023 07:01  -  10 Apr 2023 07:00  --------------------------------------------------------  IN: 1460 mL / OUT: 400 mL / NET: 1060 mL        GENERAL:  Appears stated age, no distress  HEENT:  NC/AT,  conjunctivae clear and pink  CHEST:  Full & symmetric excursion, no increased effort   HEART:  Regular rhythm, S1, S2  ABDOMEN:  Soft, non-tender, non-distended, normoactive bowel sounds  EXTEREMITIES:  no cyanosis, clubbing or edema  SKIN:  No rash/warm/dry  NEURO:  Alert       LABS:                        8.7    9.37  )-----------( 258      ( 10 Apr 2023 12:45 )             27.3     04-10    131<L>  |  95<L>  |  45<H>  ----------------------------<  154<H>  5.3   |  29  |  1.99<H>    Ca    8.2<L>      10 Apr 2023 06:11          amylase   lipase  RADIOLOGY & ADDITIONAL TESTS:   INTERVAL HPI/OVERNIGHT EVENTS:  HPI:  Unable to obtain history from patient due to lethargy. All history obtained from patient's son    Sunita  phone used, ID # [id number]    Despite use of phone, patient has poor hearing and is tired - she is not able to communicate with  phone to provide history. All hx obtain from chart review and son, Martin Santana,     89F (Gujarati-speaking) with HTN, asthma, DM2, hypothyroidism, recent hospitalization in [place] for "trouble breathing", returned from [facility] 5 days ago, comes to the ED with SOB, diagnosed with asthma exacerbation secondary to RSV. When patient hospitalized in [place], no official diagnosis was provided to family.  (07 Apr 2023 17:57)    G I Consultation called for FOB positive. With the [employer]   # [id number] Name Charley  Patient seen and examined at bed side. She is poor historian, possible poor hearing, patient stating she is constipated last bowel movement 2 days before, no family at bed side. As per RN she tolerated meal, no bowel movement. S/P 1 unite PRBC last night.     MEDICATIONS  (STANDING):  albuterol    0.083% 2.5 milliGRAM(s) Nebulizer every 6 hours  albuterol    90 MICROgram(s) HFA Inhaler 1 Puff(s) Inhalation every 4 hours  amLODIPine   Tablet 5 milliGRAM(s) Oral daily  atorvastatin 20 milliGRAM(s) Oral at bedtime  cloNIDine 0.2 milliGRAM(s) Oral two times a day  dextrose 5%. 1000 milliLiter(s) (50 mL/Hr) IV Continuous <Continuous>  dextrose 5%. 1000 milliLiter(s) (100 mL/Hr) IV Continuous <Continuous>  dextrose 50% Injectable 25 Gram(s) IV Push once  dextrose 50% Injectable 12.5 Gram(s) IV Push once  dextrose 50% Injectable 25 Gram(s) IV Push once  ferrous    sulfate 325 milliGRAM(s) Oral daily  folic acid 1 milliGRAM(s) Oral daily  glucagon  Injectable 1 milliGRAM(s) IntraMuscular once  insulin lispro (ADMELOG) corrective regimen sliding scale   SubCutaneous three times a day before meals  insulin lispro (ADMELOG) corrective regimen sliding scale   SubCutaneous at bedtime  levothyroxine 25 MICROGram(s) Oral daily  pantoprazole   Suspension 40 milliGRAM(s) Oral daily  senna 2 Tablet(s) Oral at bedtime    MEDICATIONS  (PRN):  acetaminophen     Tablet .. 650 milliGRAM(s) Oral every 6 hours PRN Temp greater or equal to 38C (100.4F), Mild Pain (1 - 3)  aluminum hydroxide/magnesium hydroxide/simethicone Suspension 30 milliLiter(s) Oral every 4 hours PRN Dyspepsia  dextrose Oral Gel 15 Gram(s) Oral once PRN Blood Glucose LESS THAN 70 milliGRAM(s)/deciliter  melatonin 3 milliGRAM(s) Oral at bedtime PRN Insomnia  ondansetron Injectable 4 milliGRAM(s) IV Push every 8 hours PRN Nausea and/or Vomiting  polyethylene glycol 3350 17 Gram(s) Oral daily PRN Constipation      Allergies    No Known Allergies    Intolerances        PAST MEDICAL & SURGICAL HISTORY:  Moderate asthma      Hypertension        	    PHYSICAL EXAM:   Vital Signs:  Vital Signs Last 24 Hrs  T(C): 36.3 (10 Apr 2023 15:39), Max: 36.7 (10 Apr 2023 05:24)  T(F): 97.4 (10 Apr 2023 15:39), Max: 98.1 (10 Apr 2023 05:24)  HR: 76 (10 Apr 2023 15:39) (64 - 98)  BP: 142/75 (10 Apr 2023 15:39) (110/67 - 161/79)  BP(mean): --  RR: 19 (10 Apr 2023 15:39) (15 - 19)  SpO2: 100% (10 Apr 2023 15:39) (98% - 100%)    Parameters below as of 10 Apr 2023 15:39  Patient On (Oxygen Delivery Method): Nebulizer      Daily     Daily I&O's Summary    09 Apr 2023 07:01  -  10 Apr 2023 07:00  --------------------------------------------------------  IN: 1460 mL / OUT: 400 mL / NET: 1060 mL        GENERAL:  Appears stated age, no distress  HEENT:  NC/AT,  conjunctivae clear and pink  CHEST:  Full & symmetric excursion, no increased effort   HEART:  Regular rhythm, S1, S2  ABDOMEN:  Soft, non-tender, non-distended, normoactive bowel sounds  EXTEREMITIES:  no cyanosis, clubbing or edema  SKIN:  No rash/warm/dry  NEURO:  Alert       LABS:                        8.7    9.37  )-----------( 258      ( 10 Apr 2023 12:45 )             27.3     04-10    131<L>  |  95<L>  |  45<H>  ----------------------------<  154<H>  5.3   |  29  |  1.99<H>    Ca    8.2<L>      10 Apr 2023 06:11          amylase   lipase  RADIOLOGY & ADDITIONAL TESTS:

## 2023-04-10 NOTE — DIETITIAN INITIAL EVALUATION ADULT - ADD RECOMMEND
1. Recommend Ensure Plus High Protein 8oz PO BID (Provides 700kcal & 40grams of Protein) to enhance PO intakes and optimize nutritional status   2. Monitor daily PO intakes, GI tolerance, labs, weights, skin integrity, & BM regularity  1. Recommend Glucerna 8oz PO BID (Provides 440kcal-20grams of Protein) to enhance PO intakes and optimize nutritional status   2. Monitor daily PO intakes, GI tolerance, labs, weights, skin integrity, & BM regularity

## 2023-04-10 NOTE — DIETITIAN INITIAL EVALUATION ADULT - PERTINENT MEDS FT
MEDICATIONS  (STANDING):  albuterol    0.083% 2.5 milliGRAM(s) Nebulizer every 6 hours  albuterol    90 MICROgram(s) HFA Inhaler 1 Puff(s) Inhalation every 4 hours  amLODIPine   Tablet 5 milliGRAM(s) Oral daily  atorvastatin 20 milliGRAM(s) Oral at bedtime  cloNIDine 0.2 milliGRAM(s) Oral two times a day  dextrose 5%. 1000 milliLiter(s) (50 mL/Hr) IV Continuous <Continuous>  dextrose 5%. 1000 milliLiter(s) (100 mL/Hr) IV Continuous <Continuous>  dextrose 50% Injectable 25 Gram(s) IV Push once  dextrose 50% Injectable 12.5 Gram(s) IV Push once  dextrose 50% Injectable 25 Gram(s) IV Push once  ferrous    sulfate 325 milliGRAM(s) Oral daily  folic acid 1 milliGRAM(s) Oral daily  glucagon  Injectable 1 milliGRAM(s) IntraMuscular once  insulin lispro (ADMELOG) corrective regimen sliding scale   SubCutaneous three times a day before meals  insulin lispro (ADMELOG) corrective regimen sliding scale   SubCutaneous at bedtime  levothyroxine 25 MICROGram(s) Oral daily  predniSONE   Tablet 40 milliGRAM(s) Oral daily    MEDICATIONS  (PRN):  acetaminophen     Tablet .. 650 milliGRAM(s) Oral every 6 hours PRN Temp greater or equal to 38C (100.4F), Mild Pain (1 - 3)  aluminum hydroxide/magnesium hydroxide/simethicone Suspension 30 milliLiter(s) Oral every 4 hours PRN Dyspepsia  dextrose Oral Gel 15 Gram(s) Oral once PRN Blood Glucose LESS THAN 70 milliGRAM(s)/deciliter  melatonin 3 milliGRAM(s) Oral at bedtime PRN Insomnia  ondansetron Injectable 4 milliGRAM(s) IV Push every 8 hours PRN Nausea and/or Vomiting

## 2023-04-11 LAB
ANION GAP SERPL CALC-SCNC: 4 MMOL/L — LOW (ref 5–17)
BUN SERPL-MCNC: 54 MG/DL — HIGH (ref 7–23)
CALCIUM SERPL-MCNC: 8.7 MG/DL — SIGNIFICANT CHANGE UP (ref 8.4–10.5)
CHLORIDE SERPL-SCNC: 93 MMOL/L — LOW (ref 96–108)
CO2 SERPL-SCNC: 29 MMOL/L — SIGNIFICANT CHANGE UP (ref 22–31)
CREAT SERPL-MCNC: 2.03 MG/DL — HIGH (ref 0.5–1.3)
EGFR: 23 ML/MIN/1.73M2 — LOW
GLUCOSE BLDC GLUCOMTR-MCNC: 210 MG/DL — HIGH (ref 70–99)
GLUCOSE BLDC GLUCOMTR-MCNC: 396 MG/DL — HIGH (ref 70–99)
GLUCOSE BLDC GLUCOMTR-MCNC: 89 MG/DL — SIGNIFICANT CHANGE UP (ref 70–99)
GLUCOSE BLDC GLUCOMTR-MCNC: 98 MG/DL — SIGNIFICANT CHANGE UP (ref 70–99)
GLUCOSE SERPL-MCNC: 178 MG/DL — HIGH (ref 70–99)
HCT VFR BLD CALC: 26.9 % — LOW (ref 34.5–45)
HGB BLD-MCNC: 8.8 G/DL — LOW (ref 11.5–15.5)
MCHC RBC-ENTMCNC: 21.4 PG — LOW (ref 27–34)
MCHC RBC-ENTMCNC: 32.7 GM/DL — SIGNIFICANT CHANGE UP (ref 32–36)
MCV RBC AUTO: 65.5 FL — LOW (ref 80–100)
NRBC # BLD: 0 /100 WBCS — SIGNIFICANT CHANGE UP (ref 0–0)
PLATELET # BLD AUTO: 239 K/UL — SIGNIFICANT CHANGE UP (ref 150–400)
POTASSIUM SERPL-MCNC: 5.7 MMOL/L — HIGH (ref 3.5–5.3)
POTASSIUM SERPL-SCNC: 5.7 MMOL/L — HIGH (ref 3.5–5.3)
RBC # BLD: 4.11 M/UL — SIGNIFICANT CHANGE UP (ref 3.8–5.2)
RBC # FLD: 18 % — HIGH (ref 10.3–14.5)
SODIUM SERPL-SCNC: 126 MMOL/L — LOW (ref 135–145)
WBC # BLD: 9.72 K/UL — SIGNIFICANT CHANGE UP (ref 3.8–10.5)
WBC # FLD AUTO: 9.72 K/UL — SIGNIFICANT CHANGE UP (ref 3.8–10.5)

## 2023-04-11 PROCEDURE — 99232 SBSQ HOSP IP/OBS MODERATE 35: CPT

## 2023-04-11 PROCEDURE — 99233 SBSQ HOSP IP/OBS HIGH 50: CPT

## 2023-04-11 RX ORDER — SODIUM ZIRCONIUM CYCLOSILICATE 10 G/10G
10 POWDER, FOR SUSPENSION ORAL EVERY 8 HOURS
Refills: 0 | Status: COMPLETED | OUTPATIENT
Start: 2023-04-11 | End: 2023-04-12

## 2023-04-11 RX ORDER — BUDESONIDE AND FORMOTEROL FUMARATE DIHYDRATE 160; 4.5 UG/1; UG/1
2 AEROSOL RESPIRATORY (INHALATION)
Refills: 0 | Status: DISCONTINUED | OUTPATIENT
Start: 2023-04-11 | End: 2023-04-15

## 2023-04-11 RX ORDER — SODIUM CHLORIDE 9 MG/ML
1000 INJECTION INTRAMUSCULAR; INTRAVENOUS; SUBCUTANEOUS
Refills: 0 | Status: DISCONTINUED | OUTPATIENT
Start: 2023-04-11 | End: 2023-04-11

## 2023-04-11 RX ORDER — FUROSEMIDE 40 MG
40 TABLET ORAL EVERY 12 HOURS
Refills: 0 | Status: COMPLETED | OUTPATIENT
Start: 2023-04-11 | End: 2023-04-12

## 2023-04-11 RX ORDER — SODIUM ZIRCONIUM CYCLOSILICATE 10 G/10G
10 POWDER, FOR SUSPENSION ORAL ONCE
Refills: 0 | Status: COMPLETED | OUTPATIENT
Start: 2023-04-11 | End: 2023-04-11

## 2023-04-11 RX ADMIN — Medication 650 MILLIGRAM(S): at 18:40

## 2023-04-11 RX ADMIN — Medication 30 MILLILITER(S): at 18:40

## 2023-04-11 RX ADMIN — Medication 40 MILLIGRAM(S): at 13:45

## 2023-04-11 RX ADMIN — ALBUTEROL 2.5 MILLIGRAM(S): 90 AEROSOL, METERED ORAL at 05:41

## 2023-04-11 RX ADMIN — ATORVASTATIN CALCIUM 20 MILLIGRAM(S): 80 TABLET, FILM COATED ORAL at 21:11

## 2023-04-11 RX ADMIN — Medication 25 MICROGRAM(S): at 05:59

## 2023-04-11 RX ADMIN — PANTOPRAZOLE SODIUM 40 MILLIGRAM(S): 20 TABLET, DELAYED RELEASE ORAL at 12:18

## 2023-04-11 RX ADMIN — SODIUM ZIRCONIUM CYCLOSILICATE 10 GRAM(S): 10 POWDER, FOR SUSPENSION ORAL at 21:12

## 2023-04-11 RX ADMIN — Medication 1 MILLIGRAM(S): at 12:17

## 2023-04-11 RX ADMIN — Medication 4: at 08:30

## 2023-04-11 RX ADMIN — ALBUTEROL 2.5 MILLIGRAM(S): 90 AEROSOL, METERED ORAL at 08:21

## 2023-04-11 RX ADMIN — Medication 325 MILLIGRAM(S): at 12:18

## 2023-04-11 RX ADMIN — POLYETHYLENE GLYCOL 3350 17 GRAM(S): 17 POWDER, FOR SOLUTION ORAL at 13:45

## 2023-04-11 RX ADMIN — Medication 30 MILLILITER(S): at 12:27

## 2023-04-11 RX ADMIN — Medication 0.2 MILLIGRAM(S): at 05:57

## 2023-04-11 RX ADMIN — SODIUM ZIRCONIUM CYCLOSILICATE 10 GRAM(S): 10 POWDER, FOR SUSPENSION ORAL at 12:19

## 2023-04-11 RX ADMIN — AMLODIPINE BESYLATE 5 MILLIGRAM(S): 2.5 TABLET ORAL at 05:57

## 2023-04-11 RX ADMIN — Medication 650 MILLIGRAM(S): at 19:40

## 2023-04-11 RX ADMIN — BUDESONIDE AND FORMOTEROL FUMARATE DIHYDRATE 2 PUFF(S): 160; 4.5 AEROSOL RESPIRATORY (INHALATION) at 22:09

## 2023-04-11 RX ADMIN — Medication 10: at 12:18

## 2023-04-11 NOTE — SWALLOW BEDSIDE ASSESSMENT ADULT - SLP GENERAL OBSERVATIONS
Patient baseline diet obtained by son prior to evaluation. Son denied hx of dysphagia, reported patient eats "everything." Patient received bedside alert in recliner... [facility]. Inconsistently follo 1 step directives. Oriented to self and gross location only. Much redirection and many repetitions required for efficient use of . Judged unreliable historian, complaints varied throughout evaluaton. Perseverative on bowel issues, reports her issues are stemming from her inability to "clear it out from my stomach." Endorses abdominal discomfort throughout. Patient baseline diet obtained by son prior to evaluation. Son denied hx of dysphagia, reported patient eats "everything." Patient received bedside alert in recliner... [facility] Inconsistently follo 1 step directives. Oriented to self and gross location only. Much redirection and many repetitions required for efficient use of . Judged unreliable historian, complaints varied throughout evaluaton. Perseverative on bowel issues, reports her issues are stemming from her inability to "clear it out from my stomach." Endorses abdominal discomfort throughout. Patient baseline diet obtained by son prior to evaluation. Son denied hx of dysphagia, reported patient eats "everything." Patient received bedside alert in recliner... [facility] Inconsistently follo 1 step directives. Oriented to self and gross location only. Much redirection and many repetitions required for efficient use of . Judged unreliable historian, complaints varied throughout evaluaton. Perseverative on bowel issues, reports her issues are stemming from her inability to "clear it out from my stomach." Endorses abdominal discomfort throughout. Patient baseline diet obtained by son, Martin, via phone call prior to evaluation. Son denied hx of dysphagia, reported patient eats "everything." Patient received bedside alert in recliner. [employer] ID #[id number] utilized for [employer] interpretation. Inconsistently followed 1 step directives. Oriented to self and gross location only. Much redirection and many repetitions required for efficient use of . Judged unreliable historian, complaints varied throughout evaluation. Did not respond to questioning appropriately. Perseverative on bowel issues, reports her issues are stemming from her inability to "clear it out from my stomach." Endorsed abdominal discomfort throughout. Noted on supplemental O2 at 2 l/min- Sp02 maintained at 100% preassement and post assessment- denied SOB throughout evaluation. Patient baseline diet obtained by son, Martin, via phone call prior to evaluation. Son denied hx of dysphagia, reported patient eats "everything." Patient received bedside alert in recliner. [employer] ID #[id number] utilized for [employer] interpretation. Inconsistently followed 1 step directives. Oriented to self and gross location only. Much redirection and many repetitions required for efficient use of . Judged unreliable historian, complaints varied throughout evaluation. Did not respond to questioning appropriately. Perseverative on bowel issues, reports her issues are stemming from her inability to "clear it out from my stomach." Endorsed abdominal discomfort throughout. Noted on supplemental O2 at 2 l/min- Sp02 maintained at 100% preassement and post assessment- denied SOB throughout evaluation. Patient baseline diet obtained by son, Martin, via phone call prior to evaluation. Son denied hx of dysphagia, reported patient eats "everything." Patient received bedside alert in recliner. [employer] ID #[id number] utilized for [employer] interpretation. Inconsistently followed 1 step directives. Oriented to self and gross location only. Much redirection and many repetitions required for efficient use of . Judged unreliable historian, complaints varied throughout evaluation. Did not respond to questioning appropriately. Perseverative on bowel issues, reports her issues are stemming from her inability to "clear it out from my stomach." Endorsed abdominal discomfort throughout. Noted on supplemental O2 at 2 l/min- Sp02 maintained at 100% preassement and post assessment- denied SOB throughout evaluation.

## 2023-04-11 NOTE — PROGRESS NOTE ADULT - ASSESSMENT
89F (Gujarati-speaking) with HTN, asthma, DM2, hypothyroidism, recent hospitalization in [place] for "trouble breathing", returned from [facility] 5 days ago, comes to the ED with SOB, diagnosed with asthma exacerbation secondary to RSV    Asthma exacerbation  RSV infection  -CT chest non-contrast shows trace bilateral pleural effusions   -Continue steroid taper  -Albuterol nebs q6h prn  -Add Symbicort   -d-dimer 506, doppler US B/L LE negative for DVT  -considered V/Q scan (Cr elevated) as pt had tachycardia and was recently on plane ride from [place], but EKG and echo not significant for RV strain, doppler LE negative for DVT, tachycardia resolved as asthma exacerbation improving  -F/U blood cultures NGTD    #Anemia of chronic disease  #Positive FOBT  -s/p 1 unit PRBCs  -Hold anticoagulation   -Continue to monitor  -Low iron and folate, Continue supplements  -Per family, patient has never had a colonoscopy, but has normally low H/H levels. Per PCP Dr. Merchant patient's hemoglobin normally around 9  -At this time family would like to pursue whatever measures are necessary to help her, understand risks of colonoscopy with older age as well as risks affiliated with blood transfusions   -GI recs    #New A-fib  -Was started on Eliquis, but with worsening anemia, FOBT +, now holding eliquis (will also stop ASA for now, no hx of CAD or stroke, risk of bleeding on ASA + Eliquis > benefits)  -Cardio recs  -Echo: LVEF 55-60%, moderate to severe aortic stenosis, grade 3 diastolic dysfunction, severe pulm htn  -troponin negative   -d-dimer positive, would consider V/Q as pt had tachycardia and was recently on plane ride from [place], but EKG and echo not significant for RV strain, doppler LE negative for DVT, tachycardia resolved as asthma exacerbation improving    #Constipation  -Bowel regimen     #Essential HTN  -c/w Norvasc, Clonidine    #Hypothyroidism  -c/w Synthroid  -TSH reviewed wnl    #Hyperkalemia-resolved  -f/u repeat BMP, no EKG changes  -Patient received 2 doses of Kayexalate     #Hyponatremia  -Monitor BMP    #Bilateral leg swelling  -improved  -US negative DVT (recent plane ride)  -Could be from low albumen state  -does not appear to be in overt heart failure at this time despite elevated pro-BNP  -FU bmp    #HLD  -c/w statin    #DM2  -hold oral meds (Metformin, Glimeperide)  -ISS  -POCT glucose testing  -F/U A1C    #PREM vs CKD3  -Prior Cr 1.15 (2017) and 1.55 (2022)  -Unclear if we are dealing with PREM on CKD3, vs if this is the new baseline  -Hold Metformin  -F/U BMP     #DVT ppx: patient anemic, positive FOBT, holding anticoagulation     Dispo: pending clinical course    Case previously d/w patient's PMD, Dr. Merchant, [phone], who is involved in her care and would appreciate any updates  4/11 Updated Anna      FULL CODE at this time 89F (Gujarati-speaking) with HTN, asthma, DM2, hypothyroidism, recent hospitalization in [place] for "trouble breathing", returned from [facility] 5 days ago, comes to the ED with SOB, diagnosed with asthma exacerbation secondary to RSV    Asthma exacerbation  RSV infection  -CT chest non-contrast shows trace bilateral pleural effusions   -Continue steroid taper  -Albuterol nebs q6h prn  -Add Symbicort   -d-dimer 506, doppler US B/L LE negative for DVT  -considered V/Q scan (Cr elevated) as pt had tachycardia and was recently on plane ride from [place], but EKG and echo not significant for RV strain, doppler LE negative for DVT, tachycardia resolved as asthma exacerbation improving  -F/U blood cultures NGTD    #Anemia of chronic disease  #Positive FOBT  -s/p 1 unit PRBCs  -Hold anticoagulation   -Continue to monitor  -Low iron and folate, Continue supplements  -Per family, patient has never had a colonoscopy, but has normally low H/H levels. Per PCP Dr. Merchant patient's hemoglobin normally around 9  -At this time family would like to pursue whatever measures are necessary to help her, understand risks of colonoscopy with older age as well as risks affiliated with blood transfusions   -GI recs    #New A-fib  -Was started on Eliquis, but with worsening anemia, FOBT +, now holding eliquis (will also stop ASA for now, no hx of CAD or stroke, risk of bleeding on ASA + Eliquis > benefits)  -Cardio recs  -Echo: LVEF 55-60%, moderate to severe aortic stenosis, grade 3 diastolic dysfunction, severe pulm htn  -troponin negative   -d-dimer positive, would consider V/Q as pt had tachycardia and was recently on plane ride from [place], but EKG and echo not significant for RV strain, doppler LE negative for DVT, tachycardia resolved as asthma exacerbation improving    #Constipation  -Bowel regimen     #Essential HTN  -c/w Norvasc, Clonidine    #Hypothyroidism  -c/w Synthroid  -TSH reviewed wnl    #Hyperkalemia-resolved  -f/u repeat BMP, no EKG changes  -Patient received 2 doses of Kayexalate     #Hyponatremia  -Monitor BMP    #Bilateral leg swelling  -improved  -US negative DVT (recent plane ride)  -Could be from low albumen state  -does not appear to be in overt heart failure at this time despite elevated pro-BNP  -FU bmp    #HLD  -c/w statin    #DM2  -hold oral meds (Metformin, Glimeperide)  -ISS  -POCT glucose testing  -F/U A1C    #PREM vs CKD3  -Prior Cr 1.15 (2017) and 1.55 (2022)  -Unclear if we are dealing with PREM on CKD3, vs if this is the new baseline  -Hold Metformin  -F/U BMP     #DVT ppx: patient anemic, positive FOBT, holding anticoagulation     Dispo: pending clinical course    Case previously d/w patient's PMD, Dr. Merchant, [phone], who is involved in her care and would appreciate any updates  4/11 Updated Anna      FULL CODE at this time 89F (Gujarati-speaking) with HTN, asthma, DM2, hypothyroidism, recent hospitalization in [place] for "trouble breathing", returned from [facility] 5 days ago, comes to the ED with SOB, diagnosed with asthma exacerbation secondary to RSV    Asthma exacerbation  RSV infection  -CT chest non-contrast shows trace bilateral pleural effusions   -Continue steroid taper  -Albuterol nebs q6h prn  -Add Symbicort   -d-dimer 506, doppler US B/L LE negative for DVT  -considered V/Q scan (Cr elevated) as pt had tachycardia and was recently on plane ride from [place], but EKG and echo not significant for RV strain, doppler LE negative for DVT, tachycardia resolved as asthma exacerbation improving  -F/U blood cultures NGTD    #Anemia of chronic disease  #Positive FOBT  -s/p 1 unit PRBCs  -Hold anticoagulation   -Continue to monitor  -Low iron and folate, Continue supplements  -Per family, patient has never had a colonoscopy, but has normally low H/H levels. Per PCP Dr. Merchant patient's hemoglobin normally around 9  -At this time family would like to pursue whatever measures are necessary to help her, understand risks of colonoscopy with older age as well as risks affiliated with blood transfusions   -GI recs    #New A-fib  -Was started on Eliquis, but with worsening anemia, FOBT +, now holding eliquis (will also stop ASA for now, no hx of CAD or stroke, risk of bleeding on ASA + Eliquis > benefits)  -Cardio recs  -Echo: LVEF 55-60%, moderate to severe aortic stenosis, grade 3 diastolic dysfunction, severe pulm htn  -troponin negative   -d-dimer positive, would consider V/Q as pt had tachycardia and was recently on plane ride from [place], but EKG and echo not significant for RV strain, doppler LE negative for DVT, tachycardia resolved as asthma exacerbation improving    #Constipation  -Bowel regimen     #Essential HTN  -c/w Norvasc, Clonidine    #Hypothyroidism  -c/w Synthroid  -TSH reviewed wnl    #Hyperkalemia-resolved  -f/u repeat BMP, no EKG changes  -Patient received 2 doses of Kayexalate     #Hyponatremia  -Monitor BMP    #Bilateral leg swelling  -improved  -US negative DVT (recent plane ride)  -Could be from low albumen state  -does not appear to be in overt heart failure at this time despite elevated pro-BNP  -FU bmp    #HLD  -c/w statin    #DM2  -hold oral meds (Metformin, Glimeperide)  -ISS  -POCT glucose testing  -F/U A1C    #PREM vs CKD3  -Prior Cr 1.15 (2017) and 1.55 (2022)  -Unclear if we are dealing with PREM on CKD3, vs if this is the new baseline  -Hold Metformin  -F/U BMP     #DVT ppx: patient anemic, positive FOBT, holding anticoagulation     Dispo: pending clinical course    Case previously d/w patient's PMD, Dr. Merchant, [phone], who is involved in her care and would appreciate any updates  4/11 Updated Anna      FULL CODE at this time

## 2023-04-11 NOTE — CONSULT NOTE ADULT - SUBJECTIVE AND OBJECTIVE BOX
[facility] NEPHROLOGY SERVICES, [facility]  NEPHROLOGY AND HYPERTENSION  [address]  [address]  [place] [address]  [phone]    MD HENRRY SANDOVAL MD YELENA ROSENBERG, MD BINNY KOSHY, MD CHRISTOPHER CAPUTO, MD EDWARD BOVER, MD      Information from chart:  "Patient is a 89y old  Female who presents with a chief complaint of Shortness of Breath (11 Apr 2023 11:16)    HPI:  Unable to obtain history from patient due to lethargy. All history obtained from patient's son    Sunita  phone used, ID # [id number]    Despite use of phone, patient has poor hearing and is tired - she is not able to communicate with  phone to provide history. All hx obtain from chart review and son, Martin Santana,     89F (Gujarati-speaking) with HTN, asthma, DM2, hypothyroidism, recent hospitalization in [place] for "trouble breathing", returned from [facility] 5 days ago, comes to the ED with SOB, diagnosed with asthma exacerbation secondary to RSV. When patient hospitalized in [facility], no official diagnosis was provided to family.  (07 Apr 2023 17:57)   "    PAST MEDICAL & SURGICAL HISTORY:  Moderate asthma      Hypertension        FAMILY HISTORY:    Allergies    No Known Allergies    Intolerances      Home Medications:  Airet 2.5 mg/3 mL (0.083%) inhalation solution: 3 by nebulizer (07 Apr 2023 18:31)  amLODIPine 5 mg oral tablet: 1 tablet orally once a day (07 Apr 2023 18:34)  aspirin 81 mg oral capsule: 1 orally once a day (07 Apr 2023 18:33)  cloNIDine 0.2 mg oral tablet: 1 orally 2 times a day (07 Apr 2023 18:31)  Crestor 5 mg oral tablet: 1 orally once a day (07 Apr 2023 18:33)  glimepiride 4 mg oral tablet: 1 orally once a day (07 Apr 2023 18:32)  metFORMIN 500 mg oral tablet: 1 orally 2 times a day (07 Apr 2023 18:33)  Synthroid 25 mcg (0.025 mg) oral tablet: 1 orally once a day (07 Apr 2023 18:33)    MEDICATIONS  (STANDING):  albuterol    90 MICROgram(s) HFA Inhaler 1 Puff(s) Inhalation every 4 hours  amLODIPine   Tablet 5 milliGRAM(s) Oral daily  atorvastatin 20 milliGRAM(s) Oral at bedtime  budesonide  80 MICROgram(s)/formoterol 4.5 MICROgram(s) Inhaler 2 Puff(s) Inhalation two times a day  cloNIDine 0.2 milliGRAM(s) Oral two times a day  dextrose 5%. 1000 milliLiter(s) (50 mL/Hr) IV Continuous <Continuous>  dextrose 5%. 1000 milliLiter(s) (100 mL/Hr) IV Continuous <Continuous>  dextrose 50% Injectable 25 Gram(s) IV Push once  dextrose 50% Injectable 12.5 Gram(s) IV Push once  dextrose 50% Injectable 25 Gram(s) IV Push once  ferrous    sulfate 325 milliGRAM(s) Oral daily  folic acid 1 milliGRAM(s) Oral daily  glucagon  Injectable 1 milliGRAM(s) IntraMuscular once  insulin lispro (ADMELOG) corrective regimen sliding scale   SubCutaneous three times a day before meals  insulin lispro (ADMELOG) corrective regimen sliding scale   SubCutaneous at bedtime  levothyroxine 25 MICROGram(s) Oral daily  pantoprazole   Suspension 40 milliGRAM(s) Oral daily  predniSONE   Tablet 20 milliGRAM(s) Oral once  senna 2 Tablet(s) Oral at bedtime  sodium zirconium cyclosilicate 10 Gram(s) Oral every 8 hours    MEDICATIONS  (PRN):  acetaminophen     Tablet .. 650 milliGRAM(s) Oral every 6 hours PRN Temp greater or equal to 38C (100.4F), Mild Pain (1 - 3)  albuterol    0.083% 2.5 milliGRAM(s) Nebulizer every 6 hours PRN Shortness of Breath and/or Wheezing  aluminum hydroxide/magnesium hydroxide/simethicone Suspension 30 milliLiter(s) Oral every 4 hours PRN Dyspepsia  dextrose Oral Gel 15 Gram(s) Oral once PRN Blood Glucose LESS THAN 70 milliGRAM(s)/deciliter  melatonin 3 milliGRAM(s) Oral at bedtime PRN Insomnia  ondansetron Injectable 4 milliGRAM(s) IV Push every 8 hours PRN Nausea and/or Vomiting  polyethylene glycol 3350 17 Gram(s) Oral daily PRN Constipation    Vital Signs Last 24 Hrs  T(C): 36.2 (11 Apr 2023 05:06), Max: 36.3 (10 Apr 2023 15:39)  T(F): 97.1 (11 Apr 2023 05:06), Max: 97.4 (10 Apr 2023 15:39)  HR: 90 (11 Apr 2023 08:21) (76 - 90)  BP: 139/78 (11 Apr 2023 05:06) (122/79 - 142/75)  BP(mean): --  RR: 16 (11 Apr 2023 05:06) (16 - 19)  SpO2: 99% (11 Apr 2023 08:21) (95% - 100%)    Parameters below as of 11 Apr 2023 08:21  Patient On (Oxygen Delivery Method): nasal cannula,2L        Daily     Daily     04-10-23 @ 07:01  -  04-11-23 @ 07:00  --------------------------------------------------------  IN: 0 mL / OUT: 300 mL / NET: -300 mL      CAPILLARY BLOOD GLUCOSE      POCT Blood Glucose.: 396 mg/dL (11 Apr 2023 12:12)  POCT Blood Glucose.: 210 mg/dL (11 Apr 2023 08:03)  POCT Blood Glucose.: 229 mg/dL (10 Apr 2023 21:44)  POCT Blood Glucose.: 254 mg/dL (10 Apr 2023 16:57)    PHYSICAL EXAM:      T(C): 36.2 (04-11-23 @ 05:06), Max: 36.3 (04-10-23 @ 15:39)  HR: 90 (04-11-23 @ 08:21) (76 - 90)  BP: 139/78 (04-11-23 @ 05:06) (122/79 - 142/75)  RR: 16 (04-11-23 @ 05:06) (16 - 19)  SpO2: 99% (04-11-23 @ 08:21) (95% - 100%)  Wt(kg): --  Lungs anterior scattered rhonchi  Heart S1S2  Abd soft NT ND  Extremities:   2-3  edema              04-11    126<L>  |  93<L>  |  54<H>  ----------------------------<  178<H>  5.7<H>   |  29  |  2.03<H>    Ca    8.7      11 Apr 2023 09:10                            8.8    9.72  )-----------( 239      ( 11 Apr 2023 09:10 )             26.9     Creatinine Trend: 2.03<--, 1.99<--, 2.01<--, 1.94<--, 2.03<--, 1.93<--    Comprehensive Metabolic Panel (04.07.23 @ 15:30)    Creatinine, Serum: 1.66 mg/dL          Assessment   PREM CKD 3; hypervolemic hypernatremia; hyperkalemia   HTN and diabetes; risk for hyperkalemic tendency;   Question of adrenal insufficiency, however hyperglycemia and HTN against    Plan  IV loop diuretic challenge;   BS management ongoing, will improve hyperkalemia   Medical rx [facility]   Renal imaging   Low K diet ;   Will follow course    Uriel Beach MD [facility] NEPHROLOGY SERVICES, [facility]  NEPHROLOGY AND HYPERTENSION  [address]  [address]  [place] [address]  [phone]    MD HENRRY SANDOVAL MD YELENA ROSENBERG, MD BINNY KOSHY, MD CHRISTOPHER CAPUTO, MD EDWARD BOVER, MD      Information from chart:  "Patient is a 89y old  Female who presents with a chief complaint of Shortness of Breath (11 Apr 2023 11:16)    HPI:  Unable to obtain history from patient due to lethargy. All history obtained from patient's son    Sunita  phone used, ID # [id number]    Despite use of phone, patient has poor hearing and is tired - she is not able to communicate with  phone to provide history. All hx obtain from chart review and son, Martin Santana,     89F (Gujarati-speaking) with HTN, asthma, DM2, hypothyroidism, recent hospitalization in [place] for "trouble breathing", returned from [facility] 5 days ago, comes to the ED with SOB, diagnosed with asthma exacerbation secondary to RSV. When patient hospitalized in [facility], no official diagnosis was provided to family.  (07 Apr 2023 17:57)   "    PAST MEDICAL & SURGICAL HISTORY:  Moderate asthma      Hypertension        FAMILY HISTORY:    Allergies    No Known Allergies    Intolerances      Home Medications:  Airet 2.5 mg/3 mL (0.083%) inhalation solution: 3 by nebulizer (07 Apr 2023 18:31)  amLODIPine 5 mg oral tablet: 1 tablet orally once a day (07 Apr 2023 18:34)  aspirin 81 mg oral capsule: 1 orally once a day (07 Apr 2023 18:33)  cloNIDine 0.2 mg oral tablet: 1 orally 2 times a day (07 Apr 2023 18:31)  Crestor 5 mg oral tablet: 1 orally once a day (07 Apr 2023 18:33)  glimepiride 4 mg oral tablet: 1 orally once a day (07 Apr 2023 18:32)  metFORMIN 500 mg oral tablet: 1 orally 2 times a day (07 Apr 2023 18:33)  Synthroid 25 mcg (0.025 mg) oral tablet: 1 orally once a day (07 Apr 2023 18:33)    MEDICATIONS  (STANDING):  albuterol    90 MICROgram(s) HFA Inhaler 1 Puff(s) Inhalation every 4 hours  amLODIPine   Tablet 5 milliGRAM(s) Oral daily  atorvastatin 20 milliGRAM(s) Oral at bedtime  budesonide  80 MICROgram(s)/formoterol 4.5 MICROgram(s) Inhaler 2 Puff(s) Inhalation two times a day  cloNIDine 0.2 milliGRAM(s) Oral two times a day  dextrose 5%. 1000 milliLiter(s) (50 mL/Hr) IV Continuous <Continuous>  dextrose 5%. 1000 milliLiter(s) (100 mL/Hr) IV Continuous <Continuous>  dextrose 50% Injectable 25 Gram(s) IV Push once  dextrose 50% Injectable 12.5 Gram(s) IV Push once  dextrose 50% Injectable 25 Gram(s) IV Push once  ferrous    sulfate 325 milliGRAM(s) Oral daily  folic acid 1 milliGRAM(s) Oral daily  glucagon  Injectable 1 milliGRAM(s) IntraMuscular once  insulin lispro (ADMELOG) corrective regimen sliding scale   SubCutaneous three times a day before meals  insulin lispro (ADMELOG) corrective regimen sliding scale   SubCutaneous at bedtime  levothyroxine 25 MICROGram(s) Oral daily  pantoprazole   Suspension 40 milliGRAM(s) Oral daily  predniSONE   Tablet 20 milliGRAM(s) Oral once  senna 2 Tablet(s) Oral at bedtime  sodium zirconium cyclosilicate 10 Gram(s) Oral every 8 hours    MEDICATIONS  (PRN):  acetaminophen     Tablet .. 650 milliGRAM(s) Oral every 6 hours PRN Temp greater or equal to 38C (100.4F), Mild Pain (1 - 3)  albuterol    0.083% 2.5 milliGRAM(s) Nebulizer every 6 hours PRN Shortness of Breath and/or Wheezing  aluminum hydroxide/magnesium hydroxide/simethicone Suspension 30 milliLiter(s) Oral every 4 hours PRN Dyspepsia  dextrose Oral Gel 15 Gram(s) Oral once PRN Blood Glucose LESS THAN 70 milliGRAM(s)/deciliter  melatonin 3 milliGRAM(s) Oral at bedtime PRN Insomnia  ondansetron Injectable 4 milliGRAM(s) IV Push every 8 hours PRN Nausea and/or Vomiting  polyethylene glycol 3350 17 Gram(s) Oral daily PRN Constipation    Vital Signs Last 24 Hrs  T(C): 36.2 (11 Apr 2023 05:06), Max: 36.3 (10 Apr 2023 15:39)  T(F): 97.1 (11 Apr 2023 05:06), Max: 97.4 (10 Apr 2023 15:39)  HR: 90 (11 Apr 2023 08:21) (76 - 90)  BP: 139/78 (11 Apr 2023 05:06) (122/79 - 142/75)  BP(mean): --  RR: 16 (11 Apr 2023 05:06) (16 - 19)  SpO2: 99% (11 Apr 2023 08:21) (95% - 100%)    Parameters below as of 11 Apr 2023 08:21  Patient On (Oxygen Delivery Method): nasal cannula,2L        Daily     Daily     04-10-23 @ 07:01  -  04-11-23 @ 07:00  --------------------------------------------------------  IN: 0 mL / OUT: 300 mL / NET: -300 mL      CAPILLARY BLOOD GLUCOSE      POCT Blood Glucose.: 396 mg/dL (11 Apr 2023 12:12)  POCT Blood Glucose.: 210 mg/dL (11 Apr 2023 08:03)  POCT Blood Glucose.: 229 mg/dL (10 Apr 2023 21:44)  POCT Blood Glucose.: 254 mg/dL (10 Apr 2023 16:57)    PHYSICAL EXAM:      T(C): 36.2 (04-11-23 @ 05:06), Max: 36.3 (04-10-23 @ 15:39)  HR: 90 (04-11-23 @ 08:21) (76 - 90)  BP: 139/78 (04-11-23 @ 05:06) (122/79 - 142/75)  RR: 16 (04-11-23 @ 05:06) (16 - 19)  SpO2: 99% (04-11-23 @ 08:21) (95% - 100%)  Wt(kg): --  Lungs anterior scattered rhonchi  Heart S1S2  Abd soft NT ND  Extremities:   2-3  edema              04-11    126<L>  |  93<L>  |  54<H>  ----------------------------<  178<H>  5.7<H>   |  29  |  2.03<H>    Ca    8.7      11 Apr 2023 09:10                            8.8    9.72  )-----------( 239      ( 11 Apr 2023 09:10 )             26.9     Creatinine Trend: 2.03<--, 1.99<--, 2.01<--, 1.94<--, 2.03<--, 1.93<--    Comprehensive Metabolic Panel (04.07.23 @ 15:30)    Creatinine, Serum: 1.66 mg/dL          Assessment   PREM CKD 3; hypervolemic hypernatremia; hyperkalemia   HTN and diabetes; risk for hyperkalemic tendency;   Question of adrenal insufficiency, however hyperglycemia and HTN against    Plan  IV loop diuretic challenge;   BS management ongoing, will improve hyperkalemia   Medical rx [facility]   Renal imaging   Low K diet ;   Will follow course    Uriel Beach MD [facility] NEPHROLOGY SERVICES, [facility]  NEPHROLOGY AND HYPERTENSION  [address]  [address]  [place] [address]  [phone]    MD HENRRY SANDOVAL MD YELENA ROSENBERG, MD BINNY KOSHY, MD CHRISTOPHER CAPUTO, MD EDWARD BOVER, MD      Information from chart:  "Patient is a 89y old  Female who presents with a chief complaint of Shortness of Breath (11 Apr 2023 11:16)    HPI:  Unable to obtain history from patient due to lethargy. All history obtained from patient's son    Sunita  phone used, ID # [id number]    Despite use of phone, patient has poor hearing and is tired - she is not able to communicate with  phone to provide history. All hx obtain from chart review and son, Martin Santana,     89F (Gujarati-speaking) with HTN, asthma, DM2, hypothyroidism, recent hospitalization in [place] for "trouble breathing", returned from [facility] 5 days ago, comes to the ED with SOB, diagnosed with asthma exacerbation secondary to RSV. When patient hospitalized in [facility], no official diagnosis was provided to family.  (07 Apr 2023 17:57)   "    PAST MEDICAL & SURGICAL HISTORY:  Moderate asthma      Hypertension        FAMILY HISTORY:    Allergies    No Known Allergies    Intolerances      Home Medications:  Airet 2.5 mg/3 mL (0.083%) inhalation solution: 3 by nebulizer (07 Apr 2023 18:31)  amLODIPine 5 mg oral tablet: 1 tablet orally once a day (07 Apr 2023 18:34)  aspirin 81 mg oral capsule: 1 orally once a day (07 Apr 2023 18:33)  cloNIDine 0.2 mg oral tablet: 1 orally 2 times a day (07 Apr 2023 18:31)  Crestor 5 mg oral tablet: 1 orally once a day (07 Apr 2023 18:33)  glimepiride 4 mg oral tablet: 1 orally once a day (07 Apr 2023 18:32)  metFORMIN 500 mg oral tablet: 1 orally 2 times a day (07 Apr 2023 18:33)  Synthroid 25 mcg (0.025 mg) oral tablet: 1 orally once a day (07 Apr 2023 18:33)    MEDICATIONS  (STANDING):  albuterol    90 MICROgram(s) HFA Inhaler 1 Puff(s) Inhalation every 4 hours  amLODIPine   Tablet 5 milliGRAM(s) Oral daily  atorvastatin 20 milliGRAM(s) Oral at bedtime  budesonide  80 MICROgram(s)/formoterol 4.5 MICROgram(s) Inhaler 2 Puff(s) Inhalation two times a day  cloNIDine 0.2 milliGRAM(s) Oral two times a day  dextrose 5%. 1000 milliLiter(s) (50 mL/Hr) IV Continuous <Continuous>  dextrose 5%. 1000 milliLiter(s) (100 mL/Hr) IV Continuous <Continuous>  dextrose 50% Injectable 25 Gram(s) IV Push once  dextrose 50% Injectable 12.5 Gram(s) IV Push once  dextrose 50% Injectable 25 Gram(s) IV Push once  ferrous    sulfate 325 milliGRAM(s) Oral daily  folic acid 1 milliGRAM(s) Oral daily  glucagon  Injectable 1 milliGRAM(s) IntraMuscular once  insulin lispro (ADMELOG) corrective regimen sliding scale   SubCutaneous three times a day before meals  insulin lispro (ADMELOG) corrective regimen sliding scale   SubCutaneous at bedtime  levothyroxine 25 MICROGram(s) Oral daily  pantoprazole   Suspension 40 milliGRAM(s) Oral daily  predniSONE   Tablet 20 milliGRAM(s) Oral once  senna 2 Tablet(s) Oral at bedtime  sodium zirconium cyclosilicate 10 Gram(s) Oral every 8 hours    MEDICATIONS  (PRN):  acetaminophen     Tablet .. 650 milliGRAM(s) Oral every 6 hours PRN Temp greater or equal to 38C (100.4F), Mild Pain (1 - 3)  albuterol    0.083% 2.5 milliGRAM(s) Nebulizer every 6 hours PRN Shortness of Breath and/or Wheezing  aluminum hydroxide/magnesium hydroxide/simethicone Suspension 30 milliLiter(s) Oral every 4 hours PRN Dyspepsia  dextrose Oral Gel 15 Gram(s) Oral once PRN Blood Glucose LESS THAN 70 milliGRAM(s)/deciliter  melatonin 3 milliGRAM(s) Oral at bedtime PRN Insomnia  ondansetron Injectable 4 milliGRAM(s) IV Push every 8 hours PRN Nausea and/or Vomiting  polyethylene glycol 3350 17 Gram(s) Oral daily PRN Constipation    Vital Signs Last 24 Hrs  T(C): 36.2 (11 Apr 2023 05:06), Max: 36.3 (10 Apr 2023 15:39)  T(F): 97.1 (11 Apr 2023 05:06), Max: 97.4 (10 Apr 2023 15:39)  HR: 90 (11 Apr 2023 08:21) (76 - 90)  BP: 139/78 (11 Apr 2023 05:06) (122/79 - 142/75)  BP(mean): --  RR: 16 (11 Apr 2023 05:06) (16 - 19)  SpO2: 99% (11 Apr 2023 08:21) (95% - 100%)    Parameters below as of 11 Apr 2023 08:21  Patient On (Oxygen Delivery Method): nasal cannula,2L        Daily     Daily     04-10-23 @ 07:01  -  04-11-23 @ 07:00  --------------------------------------------------------  IN: 0 mL / OUT: 300 mL / NET: -300 mL      CAPILLARY BLOOD GLUCOSE      POCT Blood Glucose.: 396 mg/dL (11 Apr 2023 12:12)  POCT Blood Glucose.: 210 mg/dL (11 Apr 2023 08:03)  POCT Blood Glucose.: 229 mg/dL (10 Apr 2023 21:44)  POCT Blood Glucose.: 254 mg/dL (10 Apr 2023 16:57)    PHYSICAL EXAM:      T(C): 36.2 (04-11-23 @ 05:06), Max: 36.3 (04-10-23 @ 15:39)  HR: 90 (04-11-23 @ 08:21) (76 - 90)  BP: 139/78 (04-11-23 @ 05:06) (122/79 - 142/75)  RR: 16 (04-11-23 @ 05:06) (16 - 19)  SpO2: 99% (04-11-23 @ 08:21) (95% - 100%)  Wt(kg): --  Lungs anterior scattered rhonchi  Heart S1S2  Abd soft NT ND  Extremities:   2-3  edema              04-11    126<L>  |  93<L>  |  54<H>  ----------------------------<  178<H>  5.7<H>   |  29  |  2.03<H>    Ca    8.7      11 Apr 2023 09:10                            8.8    9.72  )-----------( 239      ( 11 Apr 2023 09:10 )             26.9     Creatinine Trend: 2.03<--, 1.99<--, 2.01<--, 1.94<--, 2.03<--, 1.93<--    Comprehensive Metabolic Panel (04.07.23 @ 15:30)    Creatinine, Serum: 1.66 mg/dL          Assessment   PREM CKD 3; hypervolemic hypernatremia; hyperkalemia   HTN and diabetes; risk for hyperkalemic tendency;   Question of adrenal insufficiency, however hyperglycemia and HTN against    Plan  IV loop diuretic challenge;   BS management ongoing, will improve hyperkalemia   Medical rx K [facility]   Renal imaging   Low K diet ;   Will follow course    Uriel Beach MD

## 2023-04-11 NOTE — SWALLOW BEDSIDE ASSESSMENT ADULT - ASR SWALLOW DENTITION
Missing several upper/lower, patient reported she is (?) awaiting dental extractions before consideration for dentures/incomplete

## 2023-04-11 NOTE — PROGRESS NOTE ADULT - ASSESSMENT
89F (Gujarati-speaking) with HTN, asthma, DM2, hypothyroidism, admitted with Asthma exacerbation from RSV.  H& H low and FOB positive S/P 1 Unit PRBC H &H improved.

## 2023-04-11 NOTE — SWALLOW BEDSIDE ASSESSMENT ADULT - COMMENTS
WBC 4/11 9.72  CXR 4/7 Lungs are clear. IMPRESSION: No acute finding.  CT Chest 4/8 Lungs are clear. IMPRESSION: No acute finding.

## 2023-04-11 NOTE — SWALLOW BEDSIDE ASSESSMENT ADULT - SLP PERTINENT HISTORY OF CURRENT PROBLEM
89F (Gujarati-speaking) with HTN, asthma, DM2, hypothyroidism, recent hospitalization in [place] for "trouble breathing", returned from [place] 5 days ago, comes to the ED with SOB, diagnosed with asthma exacerbation secondary to RSV. Noted patient with + FOB with consideration for EGD as outpatient, continue bowel regime. This service consulted as patient complained of dysphagia prior to AM meal today, went on to consume 100% without difficulty per RN with no further complaints. Patient seen for swallow evaluation to subjectively assess swallow function to determine safest/ least restrictive diet.

## 2023-04-11 NOTE — SWALLOW BEDSIDE ASSESSMENT ADULT - SWALLOW EVAL: CRITERIA FOR SKILLED INTERVENTION MET
recommend f/u x1 to ensure tolerance of baseline diet given patient's inconsistent complaints reported.

## 2023-04-11 NOTE — SWALLOW BEDSIDE ASSESSMENT ADULT - SWALLOW EVAL: DIAGNOSIS
Patient presents with grossly WFL oropharyngeal swallow function. Patient self fed trials of regular solids and thin liquids. Adequate acceptance and anterior oral containment across trials. Timely mastication with suspected timely AP transport across trials. Pharyngeal motor response appreciated via digital palpation. Presented without overt s/s penetration/aspiration. Sip/bite size and rate of intake WFL. Patient inconsistent historian, initially endorsed dysphagia but could not elaborate. Patient then denied dysphagia and reported abdominal pain/discomfort and constipation. Reported she is having difficulty "clearing everything out." Patient's complaints judged GI in etiology, noted GI consult, patient + FOB. Patient presents with grossly WFL oropharyngeal swallow function. Patient self fed trials of regular solids and thin liquids. Adequate acceptance and anterior oral containment across trials. Timely mastication with suspected timely AP transport across trials. Pharyngeal motor response appreciated via digital palpation. Presented without overt s/s penetration/aspiration. Sip/bite size and rate of intake WFL. Patient inconsistent historian, initially endorsed dysphagia but could not elaborate. Patient then denied dysphagia and reported abdominal pain/discomfort and constipation. Reported she is having difficulty "clearing everything out." Remained perseverative on GI complaints, did not answer further questions appropriately. Suspect patient's complaints GI based, noted GI consult, patient + FOB. Recommend continue baseline regular/thin diet at this time. This service to f/u x 1 to assess tolerance given patient's unreliable historical reporting and perseverance on GI concerns.

## 2023-04-11 NOTE — SWALLOW BEDSIDE ASSESSMENT ADULT - ESOPHAGEAL PHASE
complaints of inability to "clear" past her stomach across intake, complaints of constipation, requested laxatives

## 2023-04-11 NOTE — PROGRESS NOTE ADULT - NS ATTEND AMEND GEN_ALL_CORE FT
Asthma exacerbation  - night time oxygen   - titrate off O2    Acute blood loss anemia  - hemoglobin improving  - s/p 1 unit PRBC   - consideration of EGD/colonoscopy - discuss with family

## 2023-04-11 NOTE — PROGRESS NOTE ADULT - SUBJECTIVE AND OBJECTIVE BOX
INTERVAL HPI/OVERNIGHT EVENTS:  Patient seen and examined at bed side. As per RN no event over night, she had 1 BM last night and 1 this morning. No melena or hematochezia  .     MEDICATIONS  (STANDING):  albuterol    90 MICROgram(s) HFA Inhaler 1 Puff(s) Inhalation every 4 hours  amLODIPine   Tablet 5 milliGRAM(s) Oral daily  atorvastatin 20 milliGRAM(s) Oral at bedtime  budesonide  80 MICROgram(s)/formoterol 4.5 MICROgram(s) Inhaler 2 Puff(s) Inhalation two times a day  cloNIDine 0.2 milliGRAM(s) Oral two times a day  dextrose 5%. 1000 milliLiter(s) (50 mL/Hr) IV Continuous <Continuous>  dextrose 5%. 1000 milliLiter(s) (100 mL/Hr) IV Continuous <Continuous>  dextrose 50% Injectable 25 Gram(s) IV Push once  dextrose 50% Injectable 12.5 Gram(s) IV Push once  dextrose 50% Injectable 25 Gram(s) IV Push once  ferrous    sulfate 325 milliGRAM(s) Oral daily  folic acid 1 milliGRAM(s) Oral daily  furosemide   Injectable 40 milliGRAM(s) IV Push every 12 hours  glucagon  Injectable 1 milliGRAM(s) IntraMuscular once  insulin lispro (ADMELOG) corrective regimen sliding scale   SubCutaneous three times a day before meals  insulin lispro (ADMELOG) corrective regimen sliding scale   SubCutaneous at bedtime  levothyroxine 25 MICROGram(s) Oral daily  pantoprazole   Suspension 40 milliGRAM(s) Oral daily  predniSONE   Tablet 20 milliGRAM(s) Oral once  senna 2 Tablet(s) Oral at bedtime  sodium zirconium cyclosilicate 10 Gram(s) Oral every 8 hours    MEDICATIONS  (PRN):  acetaminophen     Tablet .. 650 milliGRAM(s) Oral every 6 hours PRN Temp greater or equal to 38C (100.4F), Mild Pain (1 - 3)  albuterol    0.083% 2.5 milliGRAM(s) Nebulizer every 6 hours PRN Shortness of Breath and/or Wheezing  aluminum hydroxide/magnesium hydroxide/simethicone Suspension 30 milliLiter(s) Oral every 4 hours PRN Dyspepsia  dextrose Oral Gel 15 Gram(s) Oral once PRN Blood Glucose LESS THAN 70 milliGRAM(s)/deciliter  melatonin 3 milliGRAM(s) Oral at bedtime PRN Insomnia  ondansetron Injectable 4 milliGRAM(s) IV Push every 8 hours PRN Nausea and/or Vomiting  polyethylene glycol 3350 17 Gram(s) Oral daily PRN Constipation      Allergies    No Known Allergies    Intolerances        PAST MEDICAL & SURGICAL HISTORY:  Moderate asthma      Hypertension      PHYSICAL EXAM:   Vital Signs:  Vital Signs Last 24 Hrs  T(C): 36.2 (11 Apr 2023 05:06), Max: 36.3 (10 Apr 2023 15:39)  T(F): 97.1 (11 Apr 2023 05:06), Max: 97.4 (10 Apr 2023 15:39)  HR: 90 (11 Apr 2023 08:21) (76 - 90)  BP: 139/78 (11 Apr 2023 05:06) (122/79 - 142/75)  BP(mean): --  RR: 16 (11 Apr 2023 05:06) (16 - 19)  SpO2: 99% (11 Apr 2023 08:21) (95% - 100%)    Parameters below as of 11 Apr 2023 08:21  Patient On (Oxygen Delivery Method): nasal cannula,2L      Daily     Daily I&O's Summary    10 Apr 2023 07:01  -  11 Apr 2023 07:00  --------------------------------------------------------  IN: 0 mL / OUT: 300 mL / NET: -300 mL        GENERAL:  Appears stated age  HEENT:  NC/AT,  conjunctivae clear and pink  CHEST:  Full & symmetric excursion  HEART:  Regular rhythm, S1, S2  ABDOMEN:  Soft, non-tender, non-distended, normoactive bowel sounds  EXTEREMITIES:  no cyanosis,clubbing or edema  SKIN:  No rash/warm/dry  NEURO:  Alert  LABS:                        8.8    9.72  )-----------( 239      ( 11 Apr 2023 09:10 )             26.9     04-11    126<L>  |  93<L>  |  54<H>  ----------------------------<  178<H>  5.7<H>   |  29  |  2.03<H>    Ca    8.7      11 Apr 2023 09:10          amylase   lipase  RADIOLOGY & ADDITIONAL TESTS:

## 2023-04-11 NOTE — SWALLOW BEDSIDE ASSESSMENT ADULT - SWALLOW EVAL: RECOMMENDED FEEDING/EATING TECHNIQUES
maintain upright posture during/after eating for 30 mins maintain upright posture during/after eating for 30 mins/oral hygiene

## 2023-04-11 NOTE — PHYSICAL THERAPY INITIAL EVALUATION ADULT - PERTINENT HX OF CURRENT PROBLEM, REHAB EVAL
89F (Gujarati-speaking) with HTN, asthma, DM2, hypothyroidism, recent hospitalization in [place] for "trouble breathing", returned from [place] 5 days ago, comes to the ED with SOB, diagnosed with asthma exacerbation secondary to RSV    Asthma exacerbation  RSV infection

## 2023-04-11 NOTE — PROGRESS NOTE ADULT - SUBJECTIVE AND OBJECTIVE BOX
Patient is a 89y old  Female who presents with a chief complaint of Shortness of Breath (10 Apr 2023 16:12)      Patient seen and examined at bedside. Reports some shortness of breath. Denies other symptoms.     ALLERGIES:  No Known Allergies    MEDICATIONS  (STANDING):  albuterol    0.083% 2.5 milliGRAM(s) Nebulizer every 6 hours  albuterol    90 MICROgram(s) HFA Inhaler 1 Puff(s) Inhalation every 4 hours  amLODIPine   Tablet 5 milliGRAM(s) Oral daily  atorvastatin 20 milliGRAM(s) Oral at bedtime  budesonide  80 MICROgram(s)/formoterol 4.5 MICROgram(s) Inhaler 2 Puff(s) Inhalation two times a day  cloNIDine 0.2 milliGRAM(s) Oral two times a day  dextrose 5%. 1000 milliLiter(s) (100 mL/Hr) IV Continuous <Continuous>  dextrose 5%. 1000 milliLiter(s) (50 mL/Hr) IV Continuous <Continuous>  dextrose 50% Injectable 25 Gram(s) IV Push once  dextrose 50% Injectable 12.5 Gram(s) IV Push once  dextrose 50% Injectable 25 Gram(s) IV Push once  ferrous    sulfate 325 milliGRAM(s) Oral daily  folic acid 1 milliGRAM(s) Oral daily  glucagon  Injectable 1 milliGRAM(s) IntraMuscular once  insulin lispro (ADMELOG) corrective regimen sliding scale   SubCutaneous three times a day before meals  insulin lispro (ADMELOG) corrective regimen sliding scale   SubCutaneous at bedtime  levothyroxine 25 MICROGram(s) Oral daily  pantoprazole   Suspension 40 milliGRAM(s) Oral daily  predniSONE   Tablet 20 milliGRAM(s) Oral once  senna 2 Tablet(s) Oral at bedtime  sodium zirconium cyclosilicate 10 Gram(s) Oral once    MEDICATIONS  (PRN):  acetaminophen     Tablet .. 650 milliGRAM(s) Oral every 6 hours PRN Temp greater or equal to 38C (100.4F), Mild Pain (1 - 3)  aluminum hydroxide/magnesium hydroxide/simethicone Suspension 30 milliLiter(s) Oral every 4 hours PRN Dyspepsia  dextrose Oral Gel 15 Gram(s) Oral once PRN Blood Glucose LESS THAN 70 milliGRAM(s)/deciliter  melatonin 3 milliGRAM(s) Oral at bedtime PRN Insomnia  ondansetron Injectable 4 milliGRAM(s) IV Push every 8 hours PRN Nausea and/or Vomiting  polyethylene glycol 3350 17 Gram(s) Oral daily PRN Constipation    Vital Signs Last 24 Hrs  T(F): 97.1 (11 Apr 2023 05:06), Max: 97.4 (10 Apr 2023 15:39)  HR: 90 (11 Apr 2023 08:21) (76 - 90)  BP: 139/78 (11 Apr 2023 05:06) (122/79 - 142/75)  RR: 16 (11 Apr 2023 05:06) (16 - 19)  SpO2: 99% (11 Apr 2023 08:21) (95% - 100%)  I&O's Summary    10 Apr 2023 07:01  -  11 Apr 2023 07:00  --------------------------------------------------------  IN: 0 mL / OUT: 300 mL / NET: -300 mL      PHYSICAL EXAM:  General: NAD, Alert  ENT: MMM, no oral thrush   Neck: Supple, No JVD  Lungs: wheezing bilaterally, non labored breathing  Cardio: RRR, S1/S2, + murmurs  Abdomen: Soft, Nontender, Nondistended; Bowel sounds present  Extremities: No calf tenderness, Trace LE pitting edema    LABS:                        8.8    9.72  )-----------( 239      ( 11 Apr 2023 09:10 )             26.9     04-11    126  |  93  |  54  ----------------------------<  178  5.7   |  29  |  2.03    Ca    8.7      11 Apr 2023 09:10                              POCT Blood Glucose.: 210 mg/dL (11 Apr 2023 08:03)  POCT Blood Glucose.: 229 mg/dL (10 Apr 2023 21:44)  POCT Blood Glucose.: 254 mg/dL (10 Apr 2023 16:57)  POCT Blood Glucose.: 266 mg/dL (10 Apr 2023 11:39)          Culture - Urine (collected 07 Apr 2023 18:55)  Source: Clean Catch Clean Catch (Midstream)  Final Report (09 Apr 2023 11:03):    >=3 organisms. Probable collection contamination.    Culture - Blood (collected 07 Apr 2023 15:30)  Source: .Blood Blood-Peripheral  Preliminary Report (08 Apr 2023 22:02):    No growth to date.    Culture - Blood (collected 07 Apr 2023 15:30)  Source: .Blood Blood-Peripheral  Preliminary Report (08 Apr 2023 22:02):    No growth to date.          RADIOLOGY & ADDITIONAL TESTS:    Care Discussed with Consultants/Other Providers:    Patient is a 89y old  Female who presents with a chief complaint of Shortness of Breath (10 Apr 2023 16:12)        Patient seen and examined at bedside. Reports some shortness of breath. Denies other symptoms.     ALLERGIES:  No Known Allergies    MEDICATIONS  (STANDING):  albuterol    0.083% 2.5 milliGRAM(s) Nebulizer every 6 hours  albuterol    90 MICROgram(s) HFA Inhaler 1 Puff(s) Inhalation every 4 hours  amLODIPine   Tablet 5 milliGRAM(s) Oral daily  atorvastatin 20 milliGRAM(s) Oral at bedtime  budesonide  80 MICROgram(s)/formoterol 4.5 MICROgram(s) Inhaler 2 Puff(s) Inhalation two times a day  cloNIDine 0.2 milliGRAM(s) Oral two times a day  dextrose 5%. 1000 milliLiter(s) (100 mL/Hr) IV Continuous <Continuous>  dextrose 5%. 1000 milliLiter(s) (50 mL/Hr) IV Continuous <Continuous>  dextrose 50% Injectable 25 Gram(s) IV Push once  dextrose 50% Injectable 12.5 Gram(s) IV Push once  dextrose 50% Injectable 25 Gram(s) IV Push once  ferrous    sulfate 325 milliGRAM(s) Oral daily  folic acid 1 milliGRAM(s) Oral daily  glucagon  Injectable 1 milliGRAM(s) IntraMuscular once  insulin lispro (ADMELOG) corrective regimen sliding scale   SubCutaneous three times a day before meals  insulin lispro (ADMELOG) corrective regimen sliding scale   SubCutaneous at bedtime  levothyroxine 25 MICROGram(s) Oral daily  pantoprazole   Suspension 40 milliGRAM(s) Oral daily  predniSONE   Tablet 20 milliGRAM(s) Oral once  senna 2 Tablet(s) Oral at bedtime  sodium zirconium cyclosilicate 10 Gram(s) Oral once    MEDICATIONS  (PRN):  acetaminophen     Tablet .. 650 milliGRAM(s) Oral every 6 hours PRN Temp greater or equal to 38C (100.4F), Mild Pain (1 - 3)  aluminum hydroxide/magnesium hydroxide/simethicone Suspension 30 milliLiter(s) Oral every 4 hours PRN Dyspepsia  dextrose Oral Gel 15 Gram(s) Oral once PRN Blood Glucose LESS THAN 70 milliGRAM(s)/deciliter  melatonin 3 milliGRAM(s) Oral at bedtime PRN Insomnia  ondansetron Injectable 4 milliGRAM(s) IV Push every 8 hours PRN Nausea and/or Vomiting  polyethylene glycol 3350 17 Gram(s) Oral daily PRN Constipation    Vital Signs Last 24 Hrs  T(F): 97.1 (11 Apr 2023 05:06), Max: 97.4 (10 Apr 2023 15:39)  HR: 90 (11 Apr 2023 08:21) (76 - 90)  BP: 139/78 (11 Apr 2023 05:06) (122/79 - 142/75)  RR: 16 (11 Apr 2023 05:06) (16 - 19)  SpO2: 99% (11 Apr 2023 08:21) (95% - 100%)  I&O's Summary    10 Apr 2023 07:01  -  11 Apr 2023 07:00  --------------------------------------------------------  IN: 0 mL / OUT: 300 mL / NET: -300 mL      PHYSICAL EXAM:  General: NAD, Alert  ENT: MMM, no oral thrush   Neck: Supple, No JVD  Lungs: wheezing bilaterally, non labored breathing  Cardio: RRR, S1/S2, + murmurs  Abdomen: Soft, Nontender, Nondistended; Bowel sounds present  Extremities: No calf tenderness, Trace LE pitting edema    LABS:                        8.8    9.72  )-----------( 239      ( 11 Apr 2023 09:10 )             26.9     04-11    126  |  93  |  54  ----------------------------<  178  5.7   |  29  |  2.03    Ca    8.7      11 Apr 2023 09:10                              POCT Blood Glucose.: 210 mg/dL (11 Apr 2023 08:03)  POCT Blood Glucose.: 229 mg/dL (10 Apr 2023 21:44)  POCT Blood Glucose.: 254 mg/dL (10 Apr 2023 16:57)  POCT Blood Glucose.: 266 mg/dL (10 Apr 2023 11:39)          Culture - Urine (collected 07 Apr 2023 18:55)  Source: Clean Catch Clean Catch (Midstream)  Final Report (09 Apr 2023 11:03):    >=3 organisms. Probable collection contamination.    Culture - Blood (collected 07 Apr 2023 15:30)  Source: .Blood Blood-Peripheral  Preliminary Report (08 Apr 2023 22:02):    No growth to date.    Culture - Blood (collected 07 Apr 2023 15:30)  Source: .Blood Blood-Peripheral  Preliminary Report (08 Apr 2023 22:02):    No growth to date.          RADIOLOGY & ADDITIONAL TESTS:    Care Discussed with Consultants/Other Providers:

## 2023-04-12 ENCOUNTER — TRANSCRIPTION ENCOUNTER (OUTPATIENT)
Age: 88
End: 2023-04-12

## 2023-04-12 LAB
A1C WITH ESTIMATED AVERAGE GLUCOSE RESULT: 7.2 % — HIGH (ref 4–5.6)
ANION GAP SERPL CALC-SCNC: 7 MMOL/L — SIGNIFICANT CHANGE UP (ref 5–17)
BASOPHILS # BLD AUTO: 0.01 K/UL — SIGNIFICANT CHANGE UP (ref 0–0.2)
BASOPHILS NFR BLD AUTO: 0.1 % — SIGNIFICANT CHANGE UP (ref 0–2)
BUN SERPL-MCNC: 59 MG/DL — HIGH (ref 7–23)
CALCIUM SERPL-MCNC: 8.5 MG/DL — SIGNIFICANT CHANGE UP (ref 8.4–10.5)
CHLORIDE SERPL-SCNC: 93 MMOL/L — LOW (ref 96–108)
CO2 SERPL-SCNC: 29 MMOL/L — SIGNIFICANT CHANGE UP (ref 22–31)
CREAT SERPL-MCNC: 2.01 MG/DL — HIGH (ref 0.5–1.3)
CULTURE RESULTS: SIGNIFICANT CHANGE UP
EGFR: 23 ML/MIN/1.73M2 — LOW
EOSINOPHIL # BLD AUTO: 0.09 K/UL — SIGNIFICANT CHANGE UP (ref 0–0.5)
EOSINOPHIL NFR BLD AUTO: 0.9 % — SIGNIFICANT CHANGE UP (ref 0–6)
ESTIMATED AVERAGE GLUCOSE: 160 MG/DL — HIGH (ref 68–114)
GLUCOSE BLDC GLUCOMTR-MCNC: 157 MG/DL — HIGH (ref 70–99)
GLUCOSE BLDC GLUCOMTR-MCNC: 243 MG/DL — HIGH (ref 70–99)
GLUCOSE BLDC GLUCOMTR-MCNC: 276 MG/DL — HIGH (ref 70–99)
GLUCOSE BLDC GLUCOMTR-MCNC: 297 MG/DL — HIGH (ref 70–99)
GLUCOSE SERPL-MCNC: 81 MG/DL — SIGNIFICANT CHANGE UP (ref 70–99)
HCT VFR BLD CALC: 29.1 % — LOW (ref 34.5–45)
HGB BLD-MCNC: 9.3 G/DL — LOW (ref 11.5–15.5)
IMM GRANULOCYTES NFR BLD AUTO: 0.5 % — SIGNIFICANT CHANGE UP (ref 0–0.9)
LYMPHOCYTES # BLD AUTO: 1.69 K/UL — SIGNIFICANT CHANGE UP (ref 1–3.3)
LYMPHOCYTES # BLD AUTO: 17.1 % — SIGNIFICANT CHANGE UP (ref 13–44)
MCHC RBC-ENTMCNC: 21.1 PG — LOW (ref 27–34)
MCHC RBC-ENTMCNC: 32 GM/DL — SIGNIFICANT CHANGE UP (ref 32–36)
MCV RBC AUTO: 66.1 FL — LOW (ref 80–100)
MONOCYTES # BLD AUTO: 0.62 K/UL — SIGNIFICANT CHANGE UP (ref 0–0.9)
MONOCYTES NFR BLD AUTO: 6.3 % — SIGNIFICANT CHANGE UP (ref 2–14)
NEUTROPHILS # BLD AUTO: 7.4 K/UL — SIGNIFICANT CHANGE UP (ref 1.8–7.4)
NEUTROPHILS NFR BLD AUTO: 75.1 % — SIGNIFICANT CHANGE UP (ref 43–77)
NRBC # BLD: 0 /100 WBCS — SIGNIFICANT CHANGE UP (ref 0–0)
NT-PROBNP SERPL-SCNC: 8019 PG/ML — HIGH (ref 0–300)
PLATELET # BLD AUTO: 264 K/UL — SIGNIFICANT CHANGE UP (ref 150–400)
POTASSIUM SERPL-MCNC: 5.6 MMOL/L — HIGH (ref 3.5–5.3)
POTASSIUM SERPL-SCNC: 5.6 MMOL/L — HIGH (ref 3.5–5.3)
RBC # BLD: 4.4 M/UL — SIGNIFICANT CHANGE UP (ref 3.8–5.2)
RBC # FLD: 18.5 % — HIGH (ref 10.3–14.5)
SARS-COV-2 RNA SPEC QL NAA+PROBE: SIGNIFICANT CHANGE UP
SODIUM SERPL-SCNC: 129 MMOL/L — LOW (ref 135–145)
SPECIMEN SOURCE: SIGNIFICANT CHANGE UP
WBC # BLD: 9.86 K/UL — SIGNIFICANT CHANGE UP (ref 3.8–10.5)
WBC # FLD AUTO: 9.86 K/UL — SIGNIFICANT CHANGE UP (ref 3.8–10.5)

## 2023-04-12 PROCEDURE — 99233 SBSQ HOSP IP/OBS HIGH 50: CPT

## 2023-04-12 PROCEDURE — 76775 US EXAM ABDO BACK WALL LIM: CPT | Mod: 26

## 2023-04-12 PROCEDURE — 99232 SBSQ HOSP IP/OBS MODERATE 35: CPT

## 2023-04-12 RX ORDER — SODIUM ZIRCONIUM CYCLOSILICATE 10 G/10G
10 POWDER, FOR SUSPENSION ORAL EVERY 8 HOURS
Refills: 0 | Status: COMPLETED | OUTPATIENT
Start: 2023-04-12 | End: 2023-04-13

## 2023-04-12 RX ADMIN — Medication 20 MILLIGRAM(S): at 05:32

## 2023-04-12 RX ADMIN — BUDESONIDE AND FORMOTEROL FUMARATE DIHYDRATE 2 PUFF(S): 160; 4.5 AEROSOL RESPIRATORY (INHALATION) at 21:05

## 2023-04-12 RX ADMIN — Medication 3 MILLIGRAM(S): at 22:20

## 2023-04-12 RX ADMIN — Medication 0.2 MILLIGRAM(S): at 17:41

## 2023-04-12 RX ADMIN — Medication 0.2 MILLIGRAM(S): at 05:30

## 2023-04-12 RX ADMIN — SODIUM ZIRCONIUM CYCLOSILICATE 10 GRAM(S): 10 POWDER, FOR SUSPENSION ORAL at 17:40

## 2023-04-12 RX ADMIN — PANTOPRAZOLE SODIUM 40 MILLIGRAM(S): 20 TABLET, DELAYED RELEASE ORAL at 12:30

## 2023-04-12 RX ADMIN — Medication 4: at 17:41

## 2023-04-12 RX ADMIN — Medication 25 MICROGRAM(S): at 05:30

## 2023-04-12 RX ADMIN — Medication 2: at 08:37

## 2023-04-12 RX ADMIN — Medication 6: at 12:29

## 2023-04-12 RX ADMIN — Medication 325 MILLIGRAM(S): at 12:30

## 2023-04-12 RX ADMIN — Medication 40 MILLIGRAM(S): at 17:40

## 2023-04-12 RX ADMIN — Medication 40 MILLIGRAM(S): at 05:30

## 2023-04-12 RX ADMIN — BUDESONIDE AND FORMOTEROL FUMARATE DIHYDRATE 2 PUFF(S): 160; 4.5 AEROSOL RESPIRATORY (INHALATION) at 10:41

## 2023-04-12 RX ADMIN — SODIUM ZIRCONIUM CYCLOSILICATE 10 GRAM(S): 10 POWDER, FOR SUSPENSION ORAL at 05:30

## 2023-04-12 RX ADMIN — Medication 30 MILLILITER(S): at 22:20

## 2023-04-12 RX ADMIN — POLYETHYLENE GLYCOL 3350 17 GRAM(S): 17 POWDER, FOR SOLUTION ORAL at 12:29

## 2023-04-12 RX ADMIN — Medication 2: at 22:22

## 2023-04-12 RX ADMIN — Medication 1 MILLIGRAM(S): at 12:30

## 2023-04-12 RX ADMIN — AMLODIPINE BESYLATE 5 MILLIGRAM(S): 2.5 TABLET ORAL at 05:30

## 2023-04-12 RX ADMIN — ATORVASTATIN CALCIUM 20 MILLIGRAM(S): 80 TABLET, FILM COATED ORAL at 22:20

## 2023-04-12 RX ADMIN — SENNA PLUS 2 TABLET(S): 8.6 TABLET ORAL at 22:20

## 2023-04-12 NOTE — PROGRESS NOTE ADULT - NS ATTEND AMEND GEN_ALL_CORE FT
Asthma exacerbation  - resolved asthma    Acute blood loss anemia?  Fecal occult positive- hemoglobin trending up now  - EGD/colon inpatient versus outpatient -GI to decide

## 2023-04-12 NOTE — PROGRESS NOTE ADULT - SUBJECTIVE AND OBJECTIVE BOX
Patient is a 89y old  Female who presents with a chief complaint of Shortness of Breath (11 Apr 2023 13:21)  Sunita interpretor used: Aicha [id number]  Pt states she has not had a BM despite multiple BMs overnight, reportedly from nursing  Pt states she is not eating; however nursing states patient eats % of meal trays    Patient seen and examined at bedside.    ALLERGIES:  No Known Allergies    MEDICATIONS  (STANDING):  albuterol    90 MICROgram(s) HFA Inhaler 1 Puff(s) Inhalation every 4 hours  amLODIPine   Tablet 5 milliGRAM(s) Oral daily  atorvastatin 20 milliGRAM(s) Oral at bedtime  budesonide  80 MICROgram(s)/formoterol 4.5 MICROgram(s) Inhaler 2 Puff(s) Inhalation two times a day  cloNIDine 0.2 milliGRAM(s) Oral two times a day  dextrose 5%. 1000 milliLiter(s) (50 mL/Hr) IV Continuous <Continuous>  dextrose 5%. 1000 milliLiter(s) (100 mL/Hr) IV Continuous <Continuous>  dextrose 50% Injectable 25 Gram(s) IV Push once  dextrose 50% Injectable 12.5 Gram(s) IV Push once  dextrose 50% Injectable 25 Gram(s) IV Push once  ferrous    sulfate 325 milliGRAM(s) Oral daily  folic acid 1 milliGRAM(s) Oral daily  furosemide   Injectable 40 milliGRAM(s) IV Push every 12 hours  glucagon  Injectable 1 milliGRAM(s) IntraMuscular once  insulin lispro (ADMELOG) corrective regimen sliding scale   SubCutaneous three times a day before meals  insulin lispro (ADMELOG) corrective regimen sliding scale   SubCutaneous at bedtime  levothyroxine 25 MICROGram(s) Oral daily  pantoprazole   Suspension 40 milliGRAM(s) Oral daily  senna 2 Tablet(s) Oral at bedtime  sodium zirconium cyclosilicate 10 Gram(s) Oral every 8 hours    MEDICATIONS  (PRN):  acetaminophen     Tablet .. 650 milliGRAM(s) Oral every 6 hours PRN Temp greater or equal to 38C (100.4F), Mild Pain (1 - 3)  albuterol    0.083% 2.5 milliGRAM(s) Nebulizer every 6 hours PRN Shortness of Breath and/or Wheezing  aluminum hydroxide/magnesium hydroxide/simethicone Suspension 30 milliLiter(s) Oral every 4 hours PRN Dyspepsia  dextrose Oral Gel 15 Gram(s) Oral once PRN Blood Glucose LESS THAN 70 milliGRAM(s)/deciliter  melatonin 3 milliGRAM(s) Oral at bedtime PRN Insomnia  ondansetron Injectable 4 milliGRAM(s) IV Push every 8 hours PRN Nausea and/or Vomiting  polyethylene glycol 3350 17 Gram(s) Oral daily PRN Constipation    Vital Signs Last 24 Hrs  T(F): 97.8 (12 Apr 2023 04:57), Max: 97.9 (11 Apr 2023 15:14)  HR: 95 (12 Apr 2023 10:45) (86 - 96)  BP: 137/76 (12 Apr 2023 04:57) (124/69 - 139/76)  RR: 17 (12 Apr 2023 04:57) (17 - 19)  SpO2: 96% (12 Apr 2023 10:45) (95% - 100%)  I&O's Summary    11 Apr 2023 07:01  -  12 Apr 2023 07:00  --------------------------------------------------------  IN: 400 mL / OUT: 900 mL / NET: -500 mL    12 Apr 2023 07:01  -  12 Apr 2023 12:22  --------------------------------------------------------  IN: 500 mL / OUT: 600 mL / NET: -100 mL      BMI (kg/m2): 21.9 (04-07-23 @ 15:01)  PHYSICAL EXAM:  General: NAD, A/O x 2, Gujarati speaking   ENT: MMM, no thrush  Neck: Supple, No JVD  Lungs: Non labored breathing,  Clear to auscultation bilaterally,   Cardio: RRR, S1/S2, , no pitting edema bilaterally  Abdomen: Soft, Nontender, Nondistended; Bowel sounds present  Extremities: No calf tenderness, moves all extremities    LABS:                        9.3    9.86  )-----------( 264      ( 12 Apr 2023 06:09 )             29.1       04-12    129  |  93  |  59  ----------------------------<  81  5.6   |  29  |  2.01    Ca    8.5      12 Apr 2023 06:09                                POCT Blood Glucose.: 157 mg/dL (12 Apr 2023 08:27)  POCT Blood Glucose.: 98 mg/dL (11 Apr 2023 21:09)  POCT Blood Glucose.: 89 mg/dL (11 Apr 2023 16:59)          Culture - Urine (collected 07 Apr 2023 18:55)  Source: Clean Catch Clean Catch (Midstream)  Final Report (09 Apr 2023 11:03):    >=3 organisms. Probable collection contamination.    Culture - Blood (collected 07 Apr 2023 15:30)  Source: .Blood Blood-Peripheral  Preliminary Report (08 Apr 2023 22:02):    No growth to date.    Culture - Blood (collected 07 Apr 2023 15:30)  Source: .Blood Blood-Peripheral  Preliminary Report (08 Apr 2023 22:02):    No growth to date.          RADIOLOGY & ADDITIONAL TESTS:    Care Discussed with Consultants/Other Providers:    Patient is a 89y old  Female who presents with a chief complaint of Shortness of Breath (11 Apr 2023 13:21)  Sunita interpretor used: Aicha [id number]  Pt states she has not had a BM despite multiple BMs overnight, reportedly from nursing  Pt states she is not eating; however nursing states patient eats % of meal trays    Patient seen and examined at bedside.    ALLERGIES:  No Known Allergies    MEDICATIONS  (STANDING):  albuterol    90 MICROgram(s) HFA Inhaler 1 Puff(s) Inhalation every 4 hours  amLODIPine   Tablet 5 milliGRAM(s) Oral daily  atorvastatin 20 milliGRAM(s) Oral at bedtime  budesonide  80 MICROgram(s)/formoterol 4.5 MICROgram(s) Inhaler 2 Puff(s) Inhalation two times a day  cloNIDine 0.2 milliGRAM(s) Oral two times a day  dextrose 5%. 1000 milliLiter(s) (50 mL/Hr) IV Continuous <Continuous>  dextrose 5%. 1000 milliLiter(s) (100 mL/Hr) IV Continuous <Continuous>  dextrose 50% Injectable 25 Gram(s) IV Push once  dextrose 50% Injectable 12.5 Gram(s) IV Push once  dextrose 50% Injectable 25 Gram(s) IV Push once  ferrous    sulfate 325 milliGRAM(s) Oral daily  folic acid 1 milliGRAM(s) Oral daily  furosemide   Injectable 40 milliGRAM(s) IV Push every 12 hours  glucagon  Injectable 1 milliGRAM(s) IntraMuscular once  insulin lispro (ADMELOG) corrective regimen sliding scale   SubCutaneous three times a day before meals  insulin lispro (ADMELOG) corrective regimen sliding scale   SubCutaneous at bedtime  levothyroxine 25 MICROGram(s) Oral daily  pantoprazole   Suspension 40 milliGRAM(s) Oral daily  senna 2 Tablet(s) Oral at bedtime  sodium zirconium cyclosilicate 10 Gram(s) Oral every 8 hours    MEDICATIONS  (PRN):  acetaminophen     Tablet .. 650 milliGRAM(s) Oral every 6 hours PRN Temp greater or equal to 38C (100.4F), Mild Pain (1 - 3)  albuterol    0.083% 2.5 milliGRAM(s) Nebulizer every 6 hours PRN Shortness of Breath and/or Wheezing  aluminum hydroxide/magnesium hydroxide/simethicone Suspension 30 milliLiter(s) Oral every 4 hours PRN Dyspepsia  dextrose Oral Gel 15 Gram(s) Oral once PRN Blood Glucose LESS THAN 70 milliGRAM(s)/deciliter  melatonin 3 milliGRAM(s) Oral at bedtime PRN Insomnia  ondansetron Injectable 4 milliGRAM(s) IV Push every 8 hours PRN Nausea and/or Vomiting  polyethylene glycol 3350 17 Gram(s) Oral daily PRN Constipation    Vital Signs Last 24 Hrs  T(F): 97.8 (12 Apr 2023 04:57), Max: 97.9 (11 Apr 2023 15:14)  HR: 95 (12 Apr 2023 10:45) (86 - 96)  BP: 137/76 (12 Apr 2023 04:57) (124/69 - 139/76)  RR: 17 (12 Apr 2023 04:57) (17 - 19)  SpO2: 96% (12 Apr 2023 10:45) (95% - 100%)  I&O's Summary    11 Apr 2023 07:01  -  12 Apr 2023 07:00  --------------------------------------------------------  IN: 400 mL / OUT: 900 mL / NET: -500 mL    12 Apr 2023 07:01  -  12 Apr 2023 12:22  --------------------------------------------------------  IN: 500 mL / OUT: 600 mL / NET: -100 mL      BMI (kg/m2): 21.9 (04-07-23 @ 15:01)  PHYSICAL EXAM:  General: NAD, A/O x 2, Gujarati speaking   ENT: MMM, no thrush  Neck: Supple, No JVD  Lungs: Non labored breathing,  Clear to auscultation bilaterally,   Cardio: RRR, S1/S2, , no pitting edema bilaterally  Abdomen: Soft, Nontender, Nondistended; Bowel sounds present  Extremities: No calf tenderness, moves all extremities    LABS:                        9.3    9.86  )-----------( 264      ( 12 Apr 2023 06:09 )             29.1       04-12    129  |  93  |  59  ----------------------------<  81  5.6   |  29  |  2.01    Ca    8.5      12 Apr 2023 06:09                                POCT Blood Glucose.: 157 mg/dL (12 Apr 2023 08:27)  POCT Blood Glucose.: 98 mg/dL (11 Apr 2023 21:09)  POCT Blood Glucose.: 89 mg/dL (11 Apr 2023 16:59)          Culture - Urine (collected 07 Apr 2023 18:55)  Source: Clean Catch Clean Catch (Midstream)  Final Report (09 Apr 2023 11:03):    >=3 organisms. Probable collection contamination.    Culture - Blood (collected 07 Apr 2023 15:30)  Source: .Blood Blood-Peripheral  Preliminary Report (08 Apr 2023 22:02):    No growth to date.    Culture - Blood (collected 07 Apr 2023 15:30)  Source: .Blood Blood-Peripheral  Preliminary Report (08 Apr 2023 22:02):    No growth to date.          RADIOLOGY & ADDITIONAL TESTS:    Care Discussed with Consultants/Other Providers:    Patient is a 89y old  Female who presents with a chief complaint of Shortness of Breath (11 Apr 2023 13:21)  Sunita interpretor used: Aicha [id number]  Pt states she has not had a BM despite multiple BMs overnight, reportedly from nursing  Pt states she is not eating; however nursing states patient eats % of meal trays    Patient seen and examined at bedside.    ALLERGIES:  No Known Allergies    MEDICATIONS  (STANDING):  albuterol    90 MICROgram(s) HFA Inhaler 1 Puff(s) Inhalation every 4 hours  amLODIPine   Tablet 5 milliGRAM(s) Oral daily  atorvastatin 20 milliGRAM(s) Oral at bedtime  budesonide  80 MICROgram(s)/formoterol 4.5 MICROgram(s) Inhaler 2 Puff(s) Inhalation two times a day  cloNIDine 0.2 milliGRAM(s) Oral two times a day  dextrose 5%. 1000 milliLiter(s) (50 mL/Hr) IV Continuous <Continuous>  dextrose 5%. 1000 milliLiter(s) (100 mL/Hr) IV Continuous <Continuous>  dextrose 50% Injectable 25 Gram(s) IV Push once  dextrose 50% Injectable 12.5 Gram(s) IV Push once  dextrose 50% Injectable 25 Gram(s) IV Push once  ferrous    sulfate 325 milliGRAM(s) Oral daily  folic acid 1 milliGRAM(s) Oral daily  furosemide   Injectable 40 milliGRAM(s) IV Push every 12 hours  glucagon  Injectable 1 milliGRAM(s) IntraMuscular once  insulin lispro (ADMELOG) corrective regimen sliding scale   SubCutaneous three times a day before meals  insulin lispro (ADMELOG) corrective regimen sliding scale   SubCutaneous at bedtime  levothyroxine 25 MICROGram(s) Oral daily  pantoprazole   Suspension 40 milliGRAM(s) Oral daily  senna 2 Tablet(s) Oral at bedtime  sodium zirconium cyclosilicate 10 Gram(s) Oral every 8 hours    MEDICATIONS  (PRN):  acetaminophen     Tablet .. 650 milliGRAM(s) Oral every 6 hours PRN Temp greater or equal to 38C (100.4F), Mild Pain (1 - 3)  albuterol    0.083% 2.5 milliGRAM(s) Nebulizer every 6 hours PRN Shortness of Breath and/or Wheezing  aluminum hydroxide/magnesium hydroxide/simethicone Suspension 30 milliLiter(s) Oral every 4 hours PRN Dyspepsia  dextrose Oral Gel 15 Gram(s) Oral once PRN Blood Glucose LESS THAN 70 milliGRAM(s)/deciliter  melatonin 3 milliGRAM(s) Oral at bedtime PRN Insomnia  ondansetron Injectable 4 milliGRAM(s) IV Push every 8 hours PRN Nausea and/or Vomiting  polyethylene glycol 3350 17 Gram(s) Oral daily PRN Constipation    Vital Signs Last 24 Hrs  T(F): 97.8 (12 Apr 2023 04:57), Max: 97.9 (11 Apr 2023 15:14)  HR: 95 (12 Apr 2023 10:45) (86 - 96)  BP: 137/76 (12 Apr 2023 04:57) (124/69 - 139/76)  RR: 17 (12 Apr 2023 04:57) (17 - 19)  SpO2: 96% (12 Apr 2023 10:45) (95% - 100%)  I&O's Summary    11 Apr 2023 07:01  -  12 Apr 2023 07:00  --------------------------------------------------------  IN: 400 mL / OUT: 900 mL / NET: -500 mL    12 Apr 2023 07:01  -  12 Apr 2023 12:22  --------------------------------------------------------  IN: 500 mL / OUT: 600 mL / NET: -100 mL      BMI (kg/m2): 21.9 (04-07-23 @ 15:01)  PHYSICAL EXAM:  General: NAD, A/O x 2, Gujarati speaking   ENT: MMM, no thrush  Neck: Supple, No JVD  Lungs: Non labored breathing,  Clear to auscultation bilaterally,   Cardio: RRR, S1/S2, , no pitting edema bilaterally  Abdomen: Soft, Nontender, Nondistended; Bowel sounds present  Extremities: No calf tenderness, moves all extremities    LABS:                        9.3    9.86  )-----------( 264      ( 12 Apr 2023 06:09 )             29.1       04-12    129  |  93  |  59  ----------------------------<  81  5.6   |  29  |  2.01    Ca    8.5      12 Apr 2023 06:09                                POCT Blood Glucose.: 157 mg/dL (12 Apr 2023 08:27)  POCT Blood Glucose.: 98 mg/dL (11 Apr 2023 21:09)  POCT Blood Glucose.: 89 mg/dL (11 Apr 2023 16:59)          Culture - Urine (collected 07 Apr 2023 18:55)  Source: Clean Catch Clean Catch (Midstream)  Final Report (09 Apr 2023 11:03):    >=3 organisms. Probable collection contamination.    Culture - Blood (collected 07 Apr 2023 15:30)  Source: .Blood Blood-Peripheral  Preliminary Report (08 Apr 2023 22:02):    No growth to date.    Culture - Blood (collected 07 Apr 2023 15:30)  Source: .Blood Blood-Peripheral  Preliminary Report (08 Apr 2023 22:02):    No growth to date.          RADIOLOGY & ADDITIONAL TESTS:    Care Discussed with Consultants/Other Providers:    Patient is a 89y old  Female who presents with a chief complaint of Shortness of Breath (11 Apr 2023 13:21)  Sunita interpretor used: Aicha [id number]  Pt states she has not had a BM despite multiple BMs overnight, reportedly from nursing  Pt states she is not eating; however nursing states patient eats % of meal trays    Patient seen and examined at bedside.    ALLERGIES:  No Known Allergies    MEDICATIONS  (STANDING):  albuterol    90 MICROgram(s) HFA Inhaler 1 Puff(s) Inhalation every 4 hours  amLODIPine   Tablet 5 milliGRAM(s) Oral daily  atorvastatin 20 milliGRAM(s) Oral at bedtime  budesonide  80 MICROgram(s)/formoterol 4.5 MICROgram(s) Inhaler 2 Puff(s) Inhalation two times a day  cloNIDine 0.2 milliGRAM(s) Oral two times a day  dextrose 5%. 1000 milliLiter(s) (50 mL/Hr) IV Continuous <Continuous>  dextrose 5%. 1000 milliLiter(s) (100 mL/Hr) IV Continuous <Continuous>  dextrose 50% Injectable 25 Gram(s) IV Push once  dextrose 50% Injectable 12.5 Gram(s) IV Push once  dextrose 50% Injectable 25 Gram(s) IV Push once  ferrous    sulfate 325 milliGRAM(s) Oral daily  folic acid 1 milliGRAM(s) Oral daily  furosemide   Injectable 40 milliGRAM(s) IV Push every 12 hours  glucagon  Injectable 1 milliGRAM(s) IntraMuscular once  insulin lispro (ADMELOG) corrective regimen sliding scale   SubCutaneous three times a day before meals  insulin lispro (ADMELOG) corrective regimen sliding scale   SubCutaneous at bedtime  levothyroxine 25 MICROGram(s) Oral daily  pantoprazole   Suspension 40 milliGRAM(s) Oral daily  senna 2 Tablet(s) Oral at bedtime  sodium zirconium cyclosilicate 10 Gram(s) Oral every 8 hours    MEDICATIONS  (PRN):  acetaminophen     Tablet .. 650 milliGRAM(s) Oral every 6 hours PRN Temp greater or equal to 38C (100.4F), Mild Pain (1 - 3)  albuterol    0.083% 2.5 milliGRAM(s) Nebulizer every 6 hours PRN Shortness of Breath and/or Wheezing  aluminum hydroxide/magnesium hydroxide/simethicone Suspension 30 milliLiter(s) Oral every 4 hours PRN Dyspepsia  dextrose Oral Gel 15 Gram(s) Oral once PRN Blood Glucose LESS THAN 70 milliGRAM(s)/deciliter  melatonin 3 milliGRAM(s) Oral at bedtime PRN Insomnia  ondansetron Injectable 4 milliGRAM(s) IV Push every 8 hours PRN Nausea and/or Vomiting  polyethylene glycol 3350 17 Gram(s) Oral daily PRN Constipation    Vital Signs Last 24 Hrs  T(F): 97.8 (12 Apr 2023 04:57), Max: 97.9 (11 Apr 2023 15:14)  HR: 95 (12 Apr 2023 10:45) (86 - 96)  BP: 137/76 (12 Apr 2023 04:57) (124/69 - 139/76)  RR: 17 (12 Apr 2023 04:57) (17 - 19)  SpO2: 96% (12 Apr 2023 10:45) (95% - 100%)  I&O's Summary    11 Apr 2023 07:01  -  12 Apr 2023 07:00  --------------------------------------------------------  IN: 400 mL / OUT: 900 mL / NET: -500 mL    12 Apr 2023 07:01  -  12 Apr 2023 12:22  --------------------------------------------------------  IN: 500 mL / OUT: 600 mL / NET: -100 mL    BMI (kg/m2): 21.9 (04-07-23 @ 15:01)  PHYSICAL EXAM:  General: NAD, A/O x 2, Gujarati speaking   ENT: MMM, no thrush  Neck: Supple, No JVD  Lungs: Non labored breathing,  Clear to auscultation bilaterally,   Cardio: RRR, S1/S2, , no pitting edema bilaterally  Abdomen: Soft, Nontender, Nondistended; Bowel sounds present  Extremities: No calf tenderness, moves all extremities    LABS:             9.3    9.86  )-----------( 264      ( 12 Apr 2023 06:09 )             29.1       04-12  129  |  93  |  59  ----------------------------<  81  5.6   |  29  |  2.01    Ca    8.5      12 Apr 2023 06:09    POCT Blood Glucose.: 157 mg/dL (12 Apr 2023 08:27)  POCT Blood Glucose.: 98 mg/dL (11 Apr 2023 21:09)  POCT Blood Glucose.: 89 mg/dL (11 Apr 2023 16:59)    Culture - Urine (collected 07 Apr 2023 18:55)  Source: Clean Catch Clean Catch (Midstream)  Final Report (09 Apr 2023 11:03):    >=3 organisms. Probable collection contamination.    Culture - Blood (collected 07 Apr 2023 15:30)  Source: .Blood Blood-Peripheral  Preliminary Report (08 Apr 2023 22:02):    No growth to date.    Culture - Blood (collected 07 Apr 2023 15:30)  Source: .Blood Blood-Peripheral  Preliminary Report (08 Apr 2023 22:02):    No growth to date.    RADIOLOGY & ADDITIONAL TESTS:    Care Discussed with Consultants/Other Providers:    Patient is a 89y old  Female who presents with a chief complaint of Shortness of Breath (11 Apr 2023 13:21)  Sunita interpretor used: Aicha [id number]  Pt states she has not had a BM despite multiple BMs overnight, reportedly from nursing  Pt states she is not eating; however nursing states patient eats % of meal trays    Patient seen and examined at bedside.    ALLERGIES:  No Known Allergies    MEDICATIONS  (STANDING):  albuterol    90 MICROgram(s) HFA Inhaler 1 Puff(s) Inhalation every 4 hours  amLODIPine   Tablet 5 milliGRAM(s) Oral daily  atorvastatin 20 milliGRAM(s) Oral at bedtime  budesonide  80 MICROgram(s)/formoterol 4.5 MICROgram(s) Inhaler 2 Puff(s) Inhalation two times a day  cloNIDine 0.2 milliGRAM(s) Oral two times a day  dextrose 5%. 1000 milliLiter(s) (50 mL/Hr) IV Continuous <Continuous>  dextrose 5%. 1000 milliLiter(s) (100 mL/Hr) IV Continuous <Continuous>  dextrose 50% Injectable 25 Gram(s) IV Push once  dextrose 50% Injectable 12.5 Gram(s) IV Push once  dextrose 50% Injectable 25 Gram(s) IV Push once  ferrous    sulfate 325 milliGRAM(s) Oral daily  folic acid 1 milliGRAM(s) Oral daily  furosemide   Injectable 40 milliGRAM(s) IV Push every 12 hours  glucagon  Injectable 1 milliGRAM(s) IntraMuscular once  insulin lispro (ADMELOG) corrective regimen sliding scale   SubCutaneous three times a day before meals  insulin lispro (ADMELOG) corrective regimen sliding scale   SubCutaneous at bedtime  levothyroxine 25 MICROGram(s) Oral daily  pantoprazole   Suspension 40 milliGRAM(s) Oral daily  senna 2 Tablet(s) Oral at bedtime  sodium zirconium cyclosilicate 10 Gram(s) Oral every 8 hours    MEDICATIONS  (PRN):  acetaminophen     Tablet .. 650 milliGRAM(s) Oral every 6 hours PRN Temp greater or equal to 38C (100.4F), Mild Pain (1 - 3)  albuterol    0.083% 2.5 milliGRAM(s) Nebulizer every 6 hours PRN Shortness of Breath and/or Wheezing  aluminum hydroxide/magnesium hydroxide/simethicone Suspension 30 milliLiter(s) Oral every 4 hours PRN Dyspepsia  dextrose Oral Gel 15 Gram(s) Oral once PRN Blood Glucose LESS THAN 70 milliGRAM(s)/deciliter  melatonin 3 milliGRAM(s) Oral at bedtime PRN Insomnia  ondansetron Injectable 4 milliGRAM(s) IV Push every 8 hours PRN Nausea and/or Vomiting  polyethylene glycol 3350 17 Gram(s) Oral daily PRN Constipation    Vital Signs Last 24 Hrs  T(F): 97.8 (12 Apr 2023 04:57), Max: 97.9 (11 Apr 2023 15:14)  HR: 95 (12 Apr 2023 10:45) (86 - 96)  BP: 137/76 (12 Apr 2023 04:57) (124/69 - 139/76)  RR: 17 (12 Apr 2023 04:57) (17 - 19)  SpO2: 96% (12 Apr 2023 10:45) (95% - 100%)  I&O's Summary    11 Apr 2023 07:01  -  12 Apr 2023 07:00  --------------------------------------------------------  IN: 400 mL / OUT: 900 mL / NET: -500 mL    12 Apr 2023 07:01  -  12 Apr 2023 12:22  --------------------------------------------------------  IN: 500 mL / OUT: 600 mL / NET: -100 mL    BMI (kg/m2): 21.9 (04-07-23 @ 15:01)  PHYSICAL EXAM:  General: NAD, A/O x 2, Gujarati speaking   ENT: MMM, no thrush  Neck: Supple, No JVD  Lungs: Non labored breathing,  Clear to auscultation bilaterally,   Cardio: RRR, S1/S2, , no pitting edema bilaterally  Abdomen: Soft, Nontender, Nondistended; Bowel sounds present  Extremities: No calf tenderness, moves all extremities    LABS:             9.3    9.86  )-----------( 264      ( 12 Apr 2023 06:09 )             29.1       04-12  129  |  93  |  59  ----------------------------<  81  5.6   |  29  |  2.01    Ca    8.5      12 Apr 2023 06:09    POCT Blood Glucose.: 157 mg/dL (12 Apr 2023 08:27)  POCT Blood Glucose.: 98 mg/dL (11 Apr 2023 21:09)  POCT Blood Glucose.: 89 mg/dL (11 Apr 2023 16:59)    Culture - Urine (collected 07 Apr 2023 18:55)  Source: Clean Catch Clean Catch (Midstream)  Final Report (09 Apr 2023 11:03):    >=3 organisms. Probable collection contamination.    Culture - Blood (collected 07 Apr 2023 15:30)  Source: .Blood Blood-Peripheral  Preliminary Report (08 Apr 2023 22:02):    No growth to date.    Culture - Blood (collected 07 Apr 2023 15:30)  Source: .Blood Blood-Peripheral  Preliminary Report (08 Apr 2023 22:02):    No growth to date.    RADIOLOGY & ADDITIONAL TESTS:    Care Discussed with Consultants/Other Providers:    Patient is a 89y old  Female who presents with a chief complaint of Shortness of Breath (11 Apr 2023 13:21)  Sunita interpretor used: Aicha [id number]  Pt states she has not had a BM despite multiple BMs overnight, reportedly from nursing  Pt states she is not eating; however nursing states patient eats % of meal trays    Patient seen and examined at bedside.    ALLERGIES:  No Known Allergies    MEDICATIONS  (STANDING):  albuterol    90 MICROgram(s) HFA Inhaler 1 Puff(s) Inhalation every 4 hours  amLODIPine   Tablet 5 milliGRAM(s) Oral daily  atorvastatin 20 milliGRAM(s) Oral at bedtime  budesonide  80 MICROgram(s)/formoterol 4.5 MICROgram(s) Inhaler 2 Puff(s) Inhalation two times a day  cloNIDine 0.2 milliGRAM(s) Oral two times a day  dextrose 5%. 1000 milliLiter(s) (50 mL/Hr) IV Continuous <Continuous>  dextrose 5%. 1000 milliLiter(s) (100 mL/Hr) IV Continuous <Continuous>  dextrose 50% Injectable 25 Gram(s) IV Push once  dextrose 50% Injectable 12.5 Gram(s) IV Push once  dextrose 50% Injectable 25 Gram(s) IV Push once  ferrous    sulfate 325 milliGRAM(s) Oral daily  folic acid 1 milliGRAM(s) Oral daily  furosemide   Injectable 40 milliGRAM(s) IV Push every 12 hours  glucagon  Injectable 1 milliGRAM(s) IntraMuscular once  insulin lispro (ADMELOG) corrective regimen sliding scale   SubCutaneous three times a day before meals  insulin lispro (ADMELOG) corrective regimen sliding scale   SubCutaneous at bedtime  levothyroxine 25 MICROGram(s) Oral daily  pantoprazole   Suspension 40 milliGRAM(s) Oral daily  senna 2 Tablet(s) Oral at bedtime  sodium zirconium cyclosilicate 10 Gram(s) Oral every 8 hours    MEDICATIONS  (PRN):  acetaminophen     Tablet .. 650 milliGRAM(s) Oral every 6 hours PRN Temp greater or equal to 38C (100.4F), Mild Pain (1 - 3)  albuterol    0.083% 2.5 milliGRAM(s) Nebulizer every 6 hours PRN Shortness of Breath and/or Wheezing  aluminum hydroxide/magnesium hydroxide/simethicone Suspension 30 milliLiter(s) Oral every 4 hours PRN Dyspepsia  dextrose Oral Gel 15 Gram(s) Oral once PRN Blood Glucose LESS THAN 70 milliGRAM(s)/deciliter  melatonin 3 milliGRAM(s) Oral at bedtime PRN Insomnia  ondansetron Injectable 4 milliGRAM(s) IV Push every 8 hours PRN Nausea and/or Vomiting  polyethylene glycol 3350 17 Gram(s) Oral daily PRN Constipation    Vital Signs Last 24 Hrs  T(F): 97.8 (12 Apr 2023 04:57), Max: 97.9 (11 Apr 2023 15:14)  HR: 95 (12 Apr 2023 10:45) (86 - 96)  BP: 137/76 (12 Apr 2023 04:57) (124/69 - 139/76)  RR: 17 (12 Apr 2023 04:57) (17 - 19)  SpO2: 96% (12 Apr 2023 10:45) (95% - 100%)  I&O's Summary    11 Apr 2023 07:01  -  12 Apr 2023 07:00  --------------------------------------------------------  IN: 400 mL / OUT: 900 mL / NET: -500 mL    12 Apr 2023 07:01  -  12 Apr 2023 12:22  --------------------------------------------------------  IN: 500 mL / OUT: 600 mL / NET: -100 mL    BMI (kg/m2): 21.9 (04-07-23 @ 15:01)  PHYSICAL EXAM:  General: NAD, A/O x 2, Gujarati speaking   ENT: MMM, no thrush  Neck: Supple, No JVD  Lungs: Non labored breathing,  Clear to auscultation bilaterally,   Cardio: RRR, S1/S2, , no pitting edema bilaterally  Abdomen: Soft, Nontender, Nondistended; Bowel sounds present  Extremities: No calf tenderness, moves all extremities    LABS:             9.3    9.86  )-----------( 264      ( 12 Apr 2023 06:09 )             29.1       04-12  129  |  93  |  59  ----------------------------<  81  5.6   |  29  |  2.01    Ca    8.5      12 Apr 2023 06:09    POCT Blood Glucose.: 157 mg/dL (12 Apr 2023 08:27)  POCT Blood Glucose.: 98 mg/dL (11 Apr 2023 21:09)  POCT Blood Glucose.: 89 mg/dL (11 Apr 2023 16:59)    Culture - Urine (collected 07 Apr 2023 18:55)  Source: Clean Catch Clean Catch (Midstream)  Final Report (09 Apr 2023 11:03):    >=3 organisms. Probable collection contamination.    Culture - Blood (collected 07 Apr 2023 15:30)  Source: .Blood Blood-Peripheral  Preliminary Report (08 Apr 2023 22:02):    No growth to date.    Culture - Blood (collected 07 Apr 2023 15:30)  Source: .Blood Blood-Peripheral  Preliminary Report (08 Apr 2023 22:02):    No growth to date.    RADIOLOGY & ADDITIONAL TESTS:    Care Discussed with Consultants/Other Providers:

## 2023-04-12 NOTE — DISCHARGE NOTE PROVIDER - ATTENDING DISCHARGE PHYSICAL EXAMINATION:
LOS: 8d    VITALS:   T(C): 36 (04-15-23 @ 05:51), Max: 36.7 (04-14-23 @ 19:52)  HR: 82 (04-15-23 @ 05:51) (77 - 86)  BP: 157/87 (04-15-23 @ 05:51) (133/73 - 157/87)  RR: 17 (04-15-23 @ 05:51) (17 - 17)  SpO2: 99% (04-15-23 @ 05:51) (97% - 100%)    GENERAL: NAD  HEART: IRRR, S1, S2, 3/6 systolic murmur  NERVOUS SYSTEM: Alert, grossly moves all extremities

## 2023-04-12 NOTE — DISCHARGE NOTE PROVIDER - HOSPITAL COURSE
89F (Gujarati-speaking) with HTN, asthma, DM2, hypothyroidism, recent hospitalization in [place] for "trouble breathing", returned from [place] 5 days ago, comes to the ED with SOB, diagnosed with asthma exacerbation secondary to RSV. CT chest with trace bilateral pleural effusions. Given IV lasix x 3 days. Treated with steroids and duonebs. Respiratory status improved.    ALso found to have a fib. cardiology consulted. Started on AC; however hospital course c/b anemia. Found to have guaic positive. AC placed on hold. GI consulted and recommended _____________    PT rec TU  Patient discharged to [facility] for discharge home w outpatient follow  up 89F (Gujarati-speaking) with HTN, asthma, DM2, hypothyroidism, recent hospitalization in [place] for "trouble breathing", returned from [place] 5 days ago, comes to the ED with SOB, diagnosed with asthma exacerbation secondary to RSV. CT chest with trace bilateral pleural effusions. Given IV lasix x 3 days. Treated with steroids and duonebs. Respiratory status improved.    ALso found to have a fib. cardiology consulted. Started on AC; however hospital course c/b anemia. Found to have guaic positive. AC placed on hold. GI consulted and recommended _____________    PT rec TU  Patient discharged to [facility] for discharge home w outpatient follow  up 89F (Gujarati-speaking) with HTN, asthma, DM2, hypothyroidism, recent hospitalization in [place] for "trouble breathing", returned from [place] 5 days ago, comes to the ED with SOB, diagnosed with asthma exacerbation secondary to RSV. CT chest with trace bilateral pleural effusions. Given IV lasix x 3 days. Treated with steroids and duonebs. Respiratory status improved.    ALso found to have a fib. cardiology consulted. Started on AC; however hospital course c/b anemia. Found to have guaic positive. AC placed on hold. GI consulted and recommended _____________    PT rec TU  Patient discharged to [facility] for discharge home w outpatient follow  up 89F (Gujarati-speaking) with HTN, asthma, DM2, hypothyroidism, recent hospitalization in [place] for "trouble breathing", returned from [place] 5 days ago, comes to the ED with SOB, diagnosed with asthma exacerbation secondary to RSV. CT chest with trace bilateral pleural effusions. Given IV lasix x 3 days. Treated with steroids and duonebs. Respiratory status improved, stable on room air.    ALso found to have a fib. cardiology consulted. -Echo: LVEF 55-60%, moderate to severe aortic stenosis, grade 3 diastolic dysfunction, severe pulm htn. Started on AC; however hospital course c/b anemia. Found to have guaic positive. AC placed on hold. GI consulted and recommended 1 uni pRBC. Started on heparin gtt which she tolerated well, transitioned to PO Eliquis. V/Q performed, low likelihood of PE.   Outpatient EGD/colonoscopy considered by GI.     PREM on CKD Stage 4. Per chart review Cr baseline ~1.8. Renal sono with no hydro. Gentle PO hydration encouraged  Cr improved.   Home medications resumed.     PT rec TU  Patient discharged to [facility] for discharge home w outpatient follow  up    Discussed with PMD, Dr. Merchant, [phone].  Fax: [phone].    89F (Gujarati-speaking) with HTN, asthma, DM2, hypothyroidism, recent hospitalization in [place] for "trouble breathing", returned from [place] 5 days ago, comes to the ED with SOB, diagnosed with asthma exacerbation secondary to RSV. CT chest with trace bilateral pleural effusions. Given IV lasix x 3 days. Treated with steroids and duonebs. Respiratory status improved, stable on room air.    ALso found to have a fib. cardiology consulted. -Echo: LVEF 55-60%, moderate to severe aortic stenosis, grade 3 diastolic dysfunction, severe pulm htn. Started on AC; however hospital course c/b anemia. Found to have guaic positive. AC placed on hold. GI consulted and recommended 1 uni pRBC. Started on heparin gtt which she tolerated well, transitioned to PO Eliquis. V/Q performed, low likelihood of PE.   Outpatient EGD/colonoscopy considered by GI.     PREM on CKD Stage 4. Per chart review Cr baseline ~1.8. Renal sono with no hydro. Gentle PO hydration encouraged  Cr improved.   Home medications resumed.     PT rec TU  Patient discharged to [facility] for discharge home w outpatient follow  up    Discussed with PMD, Dr. Merchant, [phone].  Fax: [phone].    89F (Gujarati-speaking) with HTN, asthma, DM2, hypothyroidism, recent hospitalization in [place] for "trouble breathing", returned from [place] 5 days ago, comes to the ED with SOB, diagnosed with asthma exacerbation secondary to RSV. CT chest with trace bilateral pleural effusions. Given IV lasix x 3 days. Treated with steroids and duonebs. Respiratory status improved, stable on room air.    ALso found to have a fib. cardiology consulted. -Echo: LVEF 55-60%, moderate to severe aortic stenosis, grade 3 diastolic dysfunction, severe pulm htn. Started on AC; however hospital course c/b anemia. Found to have guaic positive. AC placed on hold. GI consulted and recommended 1 uni pRBC. Started on heparin gtt which she tolerated well, transitioned to PO Eliquis. V/Q performed, low likelihood of PE.   Outpatient EGD/colonoscopy considered by GI.     PREM on CKD Stage 4. Per chart review Cr baseline ~1.8. Renal sono with no hydro. Gentle PO hydration encouraged  Cr improved.   Home medications resumed.     PT rec TU  Patient discharged to [facility] for discharge home w outpatient follow  up    Discussed with PMD, Dr. Merchant, [phone].  Fax: [phone].

## 2023-04-12 NOTE — PROGRESS NOTE ADULT - SUBJECTIVE AND OBJECTIVE BOX
[facility] NEPHROLOGY SERVICES, [facility]  NEPHROLOGY AND HYPERTENSION  [address]  [address]  [place] [address]  [phone]    MD HENRRY SANDOVAL MD YELENA ROSENBERG, MD BINNY KOSHY, MD CHRISTOPHER CAPUTO, MD EDWARD BOVER, MD          Patient events noted    MEDICATIONS  (STANDING):  albuterol    90 MICROgram(s) HFA Inhaler 1 Puff(s) Inhalation every 4 hours  amLODIPine   Tablet 5 milliGRAM(s) Oral daily  atorvastatin 20 milliGRAM(s) Oral at bedtime  budesonide  80 MICROgram(s)/formoterol 4.5 MICROgram(s) Inhaler 2 Puff(s) Inhalation two times a day  cloNIDine 0.2 milliGRAM(s) Oral two times a day  dextrose 5%. 1000 milliLiter(s) (50 mL/Hr) IV Continuous <Continuous>  dextrose 5%. 1000 milliLiter(s) (100 mL/Hr) IV Continuous <Continuous>  dextrose 50% Injectable 25 Gram(s) IV Push once  dextrose 50% Injectable 12.5 Gram(s) IV Push once  dextrose 50% Injectable 25 Gram(s) IV Push once  ferrous    sulfate 325 milliGRAM(s) Oral daily  folic acid 1 milliGRAM(s) Oral daily  glucagon  Injectable 1 milliGRAM(s) IntraMuscular once  insulin lispro (ADMELOG) corrective regimen sliding scale   SubCutaneous three times a day before meals  insulin lispro (ADMELOG) corrective regimen sliding scale   SubCutaneous at bedtime  levothyroxine 25 MICROGram(s) Oral daily  pantoprazole   Suspension 40 milliGRAM(s) Oral daily  senna 2 Tablet(s) Oral at bedtime  sodium zirconium cyclosilicate 10 Gram(s) Oral every 8 hours    MEDICATIONS  (PRN):  acetaminophen     Tablet .. 650 milliGRAM(s) Oral every 6 hours PRN Temp greater or equal to 38C (100.4F), Mild Pain (1 - 3)  albuterol    0.083% 2.5 milliGRAM(s) Nebulizer every 6 hours PRN Shortness of Breath and/or Wheezing  aluminum hydroxide/magnesium hydroxide/simethicone Suspension 30 milliLiter(s) Oral every 4 hours PRN Dyspepsia  dextrose Oral Gel 15 Gram(s) Oral once PRN Blood Glucose LESS THAN 70 milliGRAM(s)/deciliter  melatonin 3 milliGRAM(s) Oral at bedtime PRN Insomnia  ondansetron Injectable 4 milliGRAM(s) IV Push every 8 hours PRN Nausea and/or Vomiting  polyethylene glycol 3350 17 Gram(s) Oral daily PRN Constipation      04-11-23 @ 07:01  -  04-12-23 @ 07:00  --------------------------------------------------------  IN: 400 mL / OUT: 900 mL / NET: -500 mL    04-12-23 @ 07:01  -  04-13-23 @ 01:04  --------------------------------------------------------  IN: 900 mL / OUT: 1700 mL / NET: -800 mL      PHYSICAL EXAM:      T(C): 36.4 (04-12-23 @ 21:08), Max: 36.7 (04-12-23 @ 15:14)  HR: 88 (04-12-23 @ 21:30) (88 - 100)  BP: 134/80 (04-12-23 @ 21:08) (134/80 - 149/75)  RR: 18 (04-12-23 @ 21:08) (17 - 19)  SpO2: 96% (04-12-23 @ 21:30) (96% - 100%)  Wt(kg): --  Lungs clear  Heart S1S2  Abd soft NT ND  Extremities:   tr edema                                    9.3    9.86  )-----------( 264      ( 12 Apr 2023 06:09 )             29.1     04-12    129<L>  |  93<L>  |  59<H>  ----------------------------<  81  5.6<H>   |  29  |  2.01<H>    Ca    8.5      12 Apr 2023 06:09          Creatinine Trend: 2.01<--, 2.03<--, 1.99<--, 2.01<--, 1.94<--, 2.03<--        < from: US Renal (04.12.23 @ 10:02) >  ACC: [id number] EXAM:  US KIDNEY(S)   ORDERED BY: URIEL OVALLE     PROCEDURE DATE:  04/12/2023          INTERPRETATION:  CLINICAL INFORMATION: Acute kidney injury    COMPARISON: None available.    TECHNIQUE: Sonography of the kidneys and bladder.    FINDINGS:  Right kidney: 8.0 cm. Atrophic and increased echogenicity. No renal mass,   hydronephrosis or calculi.    Left kidney: 12.6 cm. No renal mass, hydronephrosis or calculi.    Urinary bladder: Within normal limits. Volume 190 mL.    Other: Bilateral pleural effusions.    IMPRESSION:  No hydronephrosis.      < end of copied text >      Assessment   PREM CKD 3; hypervolemic hypernatremia; hyperkalemia   HTN and diabetes; risk for hyperkalemic tendency;   Question of adrenal insufficiency, however hyperglycemia and HTN against    Plan  IV loop diuretic challenge;   BS management ongoing, will improve hyperkalemia   Medical rx K [facility]   Renal imaging   Low K diet ;   Will follow course      Uriel Ovalle MD [facility] NEPHROLOGY SERVICES, [facility]  NEPHROLOGY AND HYPERTENSION  [address]  [address]  [place] [address]  [phone]    MD HENRRY SANDOVAL MD YELENA ROSENBERG, MD BINNY KOSHY, MD CHRISTOPHER CAPUTO, MD EDWARD BOVER, MD          Patient events noted    MEDICATIONS  (STANDING):  albuterol    90 MICROgram(s) HFA Inhaler 1 Puff(s) Inhalation every 4 hours  amLODIPine   Tablet 5 milliGRAM(s) Oral daily  atorvastatin 20 milliGRAM(s) Oral at bedtime  budesonide  80 MICROgram(s)/formoterol 4.5 MICROgram(s) Inhaler 2 Puff(s) Inhalation two times a day  cloNIDine 0.2 milliGRAM(s) Oral two times a day  dextrose 5%. 1000 milliLiter(s) (50 mL/Hr) IV Continuous <Continuous>  dextrose 5%. 1000 milliLiter(s) (100 mL/Hr) IV Continuous <Continuous>  dextrose 50% Injectable 25 Gram(s) IV Push once  dextrose 50% Injectable 12.5 Gram(s) IV Push once  dextrose 50% Injectable 25 Gram(s) IV Push once  ferrous    sulfate 325 milliGRAM(s) Oral daily  folic acid 1 milliGRAM(s) Oral daily  glucagon  Injectable 1 milliGRAM(s) IntraMuscular once  insulin lispro (ADMELOG) corrective regimen sliding scale   SubCutaneous three times a day before meals  insulin lispro (ADMELOG) corrective regimen sliding scale   SubCutaneous at bedtime  levothyroxine 25 MICROGram(s) Oral daily  pantoprazole   Suspension 40 milliGRAM(s) Oral daily  senna 2 Tablet(s) Oral at bedtime  sodium zirconium cyclosilicate 10 Gram(s) Oral every 8 hours    MEDICATIONS  (PRN):  acetaminophen     Tablet .. 650 milliGRAM(s) Oral every 6 hours PRN Temp greater or equal to 38C (100.4F), Mild Pain (1 - 3)  albuterol    0.083% 2.5 milliGRAM(s) Nebulizer every 6 hours PRN Shortness of Breath and/or Wheezing  aluminum hydroxide/magnesium hydroxide/simethicone Suspension 30 milliLiter(s) Oral every 4 hours PRN Dyspepsia  dextrose Oral Gel 15 Gram(s) Oral once PRN Blood Glucose LESS THAN 70 milliGRAM(s)/deciliter  melatonin 3 milliGRAM(s) Oral at bedtime PRN Insomnia  ondansetron Injectable 4 milliGRAM(s) IV Push every 8 hours PRN Nausea and/or Vomiting  polyethylene glycol 3350 17 Gram(s) Oral daily PRN Constipation      04-11-23 @ 07:01  -  04-12-23 @ 07:00  --------------------------------------------------------  IN: 400 mL / OUT: 900 mL / NET: -500 mL    04-12-23 @ 07:01  -  04-13-23 @ 01:04  --------------------------------------------------------  IN: 900 mL / OUT: 1700 mL / NET: -800 mL      PHYSICAL EXAM:      T(C): 36.4 (04-12-23 @ 21:08), Max: 36.7 (04-12-23 @ 15:14)  HR: 88 (04-12-23 @ 21:30) (88 - 100)  BP: 134/80 (04-12-23 @ 21:08) (134/80 - 149/75)  RR: 18 (04-12-23 @ 21:08) (17 - 19)  SpO2: 96% (04-12-23 @ 21:30) (96% - 100%)  Wt(kg): --  Lungs clear  Heart S1S2  Abd soft NT ND  Extremities:   tr edema                                    9.3    9.86  )-----------( 264      ( 12 Apr 2023 06:09 )             29.1     04-12    129<L>  |  93<L>  |  59<H>  ----------------------------<  81  5.6<H>   |  29  |  2.01<H>    Ca    8.5      12 Apr 2023 06:09          Creatinine Trend: 2.01<--, 2.03<--, 1.99<--, 2.01<--, 1.94<--, 2.03<--        < from: US Renal (04.12.23 @ 10:02) >  ACC: [id number] EXAM:  US KIDNEY(S)   ORDERED BY: URIEL OVALLE     PROCEDURE DATE:  04/12/2023          INTERPRETATION:  CLINICAL INFORMATION: Acute kidney injury    COMPARISON: None available.    TECHNIQUE: Sonography of the kidneys and bladder.    FINDINGS:  Right kidney: 8.0 cm. Atrophic and increased echogenicity. No renal mass,   hydronephrosis or calculi.    Left kidney: 12.6 cm. No renal mass, hydronephrosis or calculi.    Urinary bladder: Within normal limits. Volume 190 mL.    Other: Bilateral pleural effusions.    IMPRESSION:  No hydronephrosis.      < end of copied text >      Assessment   PREM CKD 3; hypervolemic hypernatremia; hyperkalemia   HTN and diabetes; risk for hyperkalemic tendency;   Question of adrenal insufficiency, however hyperglycemia and HTN against    Plan  IV loop diuretic challenge;   BS management ongoing, will improve hyperkalemia   Medical rx K [facility]   Renal imaging   Low K diet ;   Will follow course      Uriel Ovalle MD [facility] NEPHROLOGY SERVICES, [facility]  NEPHROLOGY AND HYPERTENSION  [address]  [address]  [place] [address]  [phone]    MD HENRRY SANDOVAL MD YELENA ROSENBERG, MD BINNY KOSHY, MD CHRISTOPHER CAPUTO, MD EDWARD BOVER, MD          Patient events noted    MEDICATIONS  (STANDING):  albuterol    90 MICROgram(s) HFA Inhaler 1 Puff(s) Inhalation every 4 hours  amLODIPine   Tablet 5 milliGRAM(s) Oral daily  atorvastatin 20 milliGRAM(s) Oral at bedtime  budesonide  80 MICROgram(s)/formoterol 4.5 MICROgram(s) Inhaler 2 Puff(s) Inhalation two times a day  cloNIDine 0.2 milliGRAM(s) Oral two times a day  dextrose 5%. 1000 milliLiter(s) (50 mL/Hr) IV Continuous <Continuous>  dextrose 5%. 1000 milliLiter(s) (100 mL/Hr) IV Continuous <Continuous>  dextrose 50% Injectable 25 Gram(s) IV Push once  dextrose 50% Injectable 12.5 Gram(s) IV Push once  dextrose 50% Injectable 25 Gram(s) IV Push once  ferrous    sulfate 325 milliGRAM(s) Oral daily  folic acid 1 milliGRAM(s) Oral daily  glucagon  Injectable 1 milliGRAM(s) IntraMuscular once  insulin lispro (ADMELOG) corrective regimen sliding scale   SubCutaneous three times a day before meals  insulin lispro (ADMELOG) corrective regimen sliding scale   SubCutaneous at bedtime  levothyroxine 25 MICROGram(s) Oral daily  pantoprazole   Suspension 40 milliGRAM(s) Oral daily  senna 2 Tablet(s) Oral at bedtime  sodium zirconium cyclosilicate 10 Gram(s) Oral every 8 hours    MEDICATIONS  (PRN):  acetaminophen     Tablet .. 650 milliGRAM(s) Oral every 6 hours PRN Temp greater or equal to 38C (100.4F), Mild Pain (1 - 3)  albuterol    0.083% 2.5 milliGRAM(s) Nebulizer every 6 hours PRN Shortness of Breath and/or Wheezing  aluminum hydroxide/magnesium hydroxide/simethicone Suspension 30 milliLiter(s) Oral every 4 hours PRN Dyspepsia  dextrose Oral Gel 15 Gram(s) Oral once PRN Blood Glucose LESS THAN 70 milliGRAM(s)/deciliter  melatonin 3 milliGRAM(s) Oral at bedtime PRN Insomnia  ondansetron Injectable 4 milliGRAM(s) IV Push every 8 hours PRN Nausea and/or Vomiting  polyethylene glycol 3350 17 Gram(s) Oral daily PRN Constipation      04-11-23 @ 07:01  -  04-12-23 @ 07:00  --------------------------------------------------------  IN: 400 mL / OUT: 900 mL / NET: -500 mL    04-12-23 @ 07:01  -  04-13-23 @ 01:04  --------------------------------------------------------  IN: 900 mL / OUT: 1700 mL / NET: -800 mL      PHYSICAL EXAM:      T(C): 36.4 (04-12-23 @ 21:08), Max: 36.7 (04-12-23 @ 15:14)  HR: 88 (04-12-23 @ 21:30) (88 - 100)  BP: 134/80 (04-12-23 @ 21:08) (134/80 - 149/75)  RR: 18 (04-12-23 @ 21:08) (17 - 19)  SpO2: 96% (04-12-23 @ 21:30) (96% - 100%)  Wt(kg): --  Lungs clear  Heart S1S2  Abd soft NT ND  Extremities:   tr edema                                    9.3    9.86  )-----------( 264      ( 12 Apr 2023 06:09 )             29.1     04-12    129<L>  |  93<L>  |  59<H>  ----------------------------<  81  5.6<H>   |  29  |  2.01<H>    Ca    8.5      12 Apr 2023 06:09          Creatinine Trend: 2.01<--, 2.03<--, 1.99<--, 2.01<--, 1.94<--, 2.03<--        < from: US Renal (04.12.23 @ 10:02) >  ACC: [id number] EXAM:  US KIDNEY(S)   ORDERED BY: URIEL OVALLE     PROCEDURE DATE:  04/12/2023          INTERPRETATION:  CLINICAL INFORMATION: Acute kidney injury    COMPARISON: None available.    TECHNIQUE: Sonography of the kidneys and bladder.    FINDINGS:  Right kidney: 8.0 cm. Atrophic and increased echogenicity. No renal mass,   hydronephrosis or calculi.    Left kidney: 12.6 cm. No renal mass, hydronephrosis or calculi.    Urinary bladder: Within normal limits. Volume 190 mL.    Other: Bilateral pleural effusions.    IMPRESSION:  No hydronephrosis.      < end of copied text >      Assessment   PREM CKD 3; hypervolemic hypernatremia; hyperkalemia   HTN and diabetes; risk for hyperkalemic tendency;   Question of adrenal insufficiency, however hyperglycemia and HTN against    Plan  IV loop diuretic challenge;   BS management ongoing, will improve hyperkalemia   Medical rx K [facility]   Renal imaging   Low K diet ;   Will follow course      Uriel Ovalle MD

## 2023-04-12 NOTE — PROGRESS NOTE ADULT - SUBJECTIVE AND OBJECTIVE BOX
INTERVAL HPI/OVERNIGHT EVENTS:  Patient seen and examined at bed side. She denies abdominal pain. As per RN no event over night. She had one normal bowel movement no melena or hematochezia .     MEDICATIONS  (STANDING):  albuterol    90 MICROgram(s) HFA Inhaler 1 Puff(s) Inhalation every 4 hours  amLODIPine   Tablet 5 milliGRAM(s) Oral daily  atorvastatin 20 milliGRAM(s) Oral at bedtime  budesonide  80 MICROgram(s)/formoterol 4.5 MICROgram(s) Inhaler 2 Puff(s) Inhalation two times a day  cloNIDine 0.2 milliGRAM(s) Oral two times a day  dextrose 5%. 1000 milliLiter(s) (50 mL/Hr) IV Continuous <Continuous>  dextrose 5%. 1000 milliLiter(s) (100 mL/Hr) IV Continuous <Continuous>  dextrose 50% Injectable 25 Gram(s) IV Push once  dextrose 50% Injectable 12.5 Gram(s) IV Push once  dextrose 50% Injectable 25 Gram(s) IV Push once  ferrous    sulfate 325 milliGRAM(s) Oral daily  folic acid 1 milliGRAM(s) Oral daily  furosemide   Injectable 40 milliGRAM(s) IV Push every 12 hours  glucagon  Injectable 1 milliGRAM(s) IntraMuscular once  insulin lispro (ADMELOG) corrective regimen sliding scale   SubCutaneous three times a day before meals  insulin lispro (ADMELOG) corrective regimen sliding scale   SubCutaneous at bedtime  levothyroxine 25 MICROGram(s) Oral daily  pantoprazole   Suspension 40 milliGRAM(s) Oral daily  senna 2 Tablet(s) Oral at bedtime  sodium zirconium cyclosilicate 10 Gram(s) Oral every 8 hours    MEDICATIONS  (PRN):  acetaminophen     Tablet .. 650 milliGRAM(s) Oral every 6 hours PRN Temp greater or equal to 38C (100.4F), Mild Pain (1 - 3)  albuterol    0.083% 2.5 milliGRAM(s) Nebulizer every 6 hours PRN Shortness of Breath and/or Wheezing  aluminum hydroxide/magnesium hydroxide/simethicone Suspension 30 milliLiter(s) Oral every 4 hours PRN Dyspepsia  dextrose Oral Gel 15 Gram(s) Oral once PRN Blood Glucose LESS THAN 70 milliGRAM(s)/deciliter  melatonin 3 milliGRAM(s) Oral at bedtime PRN Insomnia  ondansetron Injectable 4 milliGRAM(s) IV Push every 8 hours PRN Nausea and/or Vomiting  polyethylene glycol 3350 17 Gram(s) Oral daily PRN Constipation      Allergies    No Known Allergies    Intolerances        PAST MEDICAL & SURGICAL HISTORY:  Moderate asthma      Hypertension   	    PHYSICAL EXAM:   Vital Signs:  Vital Signs Last 24 Hrs  T(C): 36.7 (12 Apr 2023 15:14), Max: 36.7 (12 Apr 2023 15:14)  T(F): 98.1 (12 Apr 2023 15:14), Max: 98.1 (12 Apr 2023 15:14)  HR: 88 (12 Apr 2023 15:14) (86 - 96)  BP: 149/75 (12 Apr 2023 15:14) (137/76 - 149/75)  BP(mean): --  RR: 19 (12 Apr 2023 15:14) (17 - 19)  SpO2: 96% (12 Apr 2023 15:14) (95% - 100%)    Parameters below as of 12 Apr 2023 15:14  Patient On (Oxygen Delivery Method): room air      Daily     Daily I&O's Summary    11 Apr 2023 07:01  -  12 Apr 2023 07:00  --------------------------------------------------------  IN: 400 mL / OUT: 900 mL / NET: -500 mL    12 Apr 2023 07:01  -  12 Apr 2023 15:32  --------------------------------------------------------  IN: 900 mL / OUT: 1100 mL / NET: -200 mL        GENERAL:  Appears stated age  HEENT:  NC/AT,  conjunctivae clear and pink,   CHEST:  Full & symmetric excursion,  HEART:  Regular rhythm, S1, S2  ABDOMEN:  Soft, non-tender, non-distended, normoactive bowel sounds  EXTEREMITIES:  no cyanosis , clubbing   SKIN:  No rash/warm/dry  NEURO:  Alert, oriented    LABS:                        9.3    9.86  )-----------( 264      ( 12 Apr 2023 06:09 )             29.1     04-12    129<L>  |  93<L>  |  59<H>  ----------------------------<  81  5.6<H>   |  29  |  2.01<H>    Ca    8.5      12 Apr 2023 06:09          amylase   lipase  RADIOLOGY & ADDITIONAL TESTS:

## 2023-04-12 NOTE — DISCHARGE NOTE PROVIDER - NSDCMRMEDTOKEN_GEN_ALL_CORE_FT
Airet 2.5 mg/3 mL (0.083%) inhalation solution: 3 by nebulizer  amLODIPine 5 mg oral tablet: 1 tablet orally once a day  aspirin 81 mg oral capsule: 1 orally once a day  cloNIDine 0.2 mg oral tablet: 1 orally 2 times a day  Crestor 5 mg oral tablet: 1 orally once a day  glimepiride 4 mg oral tablet: 1 orally once a day  metFORMIN 500 mg oral tablet: 1 orally 2 times a day  Synthroid 25 mcg (0.025 mg) oral tablet: 1 orally once a day   acetaminophen 325 mg oral tablet: 2 tab(s) orally every 6 hours As needed Temp greater or equal to 38C (100.4F), Mild Pain (1 - 3)  Airet 2.5 mg/3 mL (0.083%) inhalation solution: 3 solution by nebulizer 4 times a day as needed for  shortness of breath and/or wheezing  amLODIPine 5 mg oral tablet: 1 tablet orally once a day  apixaban 2.5 mg oral tablet: 1 tab(s) orally 2 times a day  cloNIDine 0.2 mg oral tablet: 1 orally 2 times a day  Crestor 5 mg oral tablet: 1 orally once a day  ferrous sulfate 325 mg (65 mg elemental iron) oral tablet: 1 tab(s) orally once a day  folic acid 1 mg oral tablet: 1 tab(s) orally once a day  metFORMIN 500 mg oral tablet: 1 orally 2 times a day  pantoprazole 40 mg oral granule, delayed release: orally 2 times a day  polyethylene glycol 3350 oral powder for reconstitution: 17 gram(s) orally once a day As needed Constipation  senna leaf extract oral tablet: 2 tab(s) orally once a day (at bedtime)  Synthroid 25 mcg (0.025 mg) oral tablet: 1 orally once a day

## 2023-04-12 NOTE — DISCHARGE NOTE PROVIDER - NSDCCPCAREPLAN_GEN_ALL_CORE_FT
PRINCIPAL DISCHARGE DIAGNOSIS  Diagnosis: Acute asthma exacerbation  Assessment and Plan of Treatment: You were admitted to the hospital for shotness of breath due to RSV infection causing an asthma exacerbation.   -You received Lasix, steroids, and inhalers  -Your oxygen saturation improved   -Follow up with your primary care physician within the week      SECONDARY DISCHARGE DIAGNOSES  Diagnosis: Atrial fibrillation  Assessment and Plan of Treatment: You were found to have an irregular heart rhythm called atrial fibrillation  You were started on a blood thinner Eliquis. You initially had bleeding from the blood thinner but you received a unit of blood and started on Protonix twice daily and your anemia improved back to baseline  -Take Protonix twice daily  -Continue Eliquis 2.5mg twice daily  This medication is a blood thinner and can cause increased risk of bleed. If you notice any signs of overt bleeding including large nose bleed, in cough, in urine, bowel movements, excessive bleed from injuries or if you have large fall especially with head injury, stop use of this medication and present to your nearest emergency department.  -Follow up with your cardiologist within the week  -Follow up with your primary care physician within the week  -Follow up with your gastroenterologist within the week. Consider an outpatient endoscopy or colonoscopy. (this was discussed with your PCP Dr Merchant).

## 2023-04-12 NOTE — PROGRESS NOTE ADULT - ASSESSMENT
89F (Gujarati-speaking) with HTN, asthma, DM2, hypothyroidism, recent hospitalization in [place] for "trouble breathing", returned from [facility] 5 days ago, comes to the ED with SOB, diagnosed with asthma exacerbation secondary to RSV    Asthma exacerbation  RSV infection  -CT chest non-contrast shows trace bilateral pleural effusions   -Continue steroid taper  -Albuterol nebs q6h prn  -Added Symbicort   -d-dimer 506, doppler US B/L LE negative for DVT  -considered V/Q scan (Cr elevated) as pt had tachycardia and was recently on plane ride from [place], but EKG and echo not significant for RV strain, doppler LE negative for DVT, tachycardia resolved as asthma exacerbation improving  -F/U blood cultures NGTD    *Anemia of chronic disease  *Positive FOBT  -s/p 1 unit PRBCs  -Hold anticoagulation   -Continue to monitor  -Low iron and folate, Continue supplements  -Per family, patient has never had a colonoscopy, but has normally low H/H levels. Per PCP Dr. Merchant patient's hemoglobin normally around 9  -At this time family would like to pursue whatever measures are necessary to help her, understand risks of colonoscopy with older age as well as risks affiliated with blood transfusions   -GI recs--may plan for egd/cscope    *New A-fib  -Was started on Eliquis, but with worsening anemia, FOBT +, now holding eliquis (will also stop ASA for now, no hx of CAD or stroke, risk of bleeding on ASA + Eliquis > benefits)  -Cardio recs  -Echo: LVEF 55-60%, moderate to severe aortic stenosis, grade 3 diastolic dysfunction, severe pulm htn  -troponin negative   -d-dimer positive, would consider V/Q as pt had tachycardia and was recently on plane ride from [place], but EKG and echo not significant for RV strain, doppler LE negative for DVT, tachycardia resolved as asthma exacerbation improving    *Constipation  -Bowel regimen   - Having multiple BMs as per nursing    *Essential HTN  -c/w Norvasc, Clonidine    *Hypothyroidism  -c/w Synthroid  -TSH reviewed wnl     *Bilateral leg swelling  -improved  -US negative DVT (recent plane ride)  -Suspect from low albumen state  -does not appear to be in overt heart failure at this time despite elevated pro-BNP    *HLD  -c/w statin    *DM2  -hold oral meds (Metformin, Glimeperide)  -ISS  -Hgb A1c 7.2    *PREM vs CKD3  -Prior Cr 1.15 (2017) and 1.55 (2022)  -Unclear if we are dealing with PREM on CKD3, vs if this is the new baseline  -Hold Metformin  -Renal sono with no hydro    #DVT ppx: patient anemic, positive FOBT, holding anticoagulation     Dispo: pending GI for inpatient EGD.cscope. PT rec TU.     Case previously d/w patient's PMD, Dr. Merchant, [phone], who is involved in her care and would appreciate any updates  4/12 Updated Anna at     Code Status: DNR        89F (Gujarati-speaking) with HTN, asthma, DM2, hypothyroidism, recent hospitalization in [place] for "trouble breathing", returned from [facility] 5 days ago, comes to the ED with SOB, diagnosed with asthma exacerbation secondary to RSV    Asthma exacerbation  RSV infection  -CT chest non-contrast shows trace bilateral pleural effusions   -Continue steroid taper  -Albuterol nebs q6h prn  -Added Symbicort   -d-dimer 506, doppler US B/L LE negative for DVT  -considered V/Q scan (Cr elevated) as pt had tachycardia and was recently on plane ride from [place], but EKG and echo not significant for RV strain, doppler LE negative for DVT, tachycardia resolved as asthma exacerbation improving  -F/U blood cultures NGTD    *Anemia of chronic disease  *Positive FOBT  -s/p 1 unit PRBCs  -Hold anticoagulation   -Continue to monitor  -Low iron and folate, Continue supplements  -Per family, patient has never had a colonoscopy, but has normally low H/H levels. Per PCP Dr. Merchant patient's hemoglobin normally around 9  -At this time family would like to pursue whatever measures are necessary to help her, understand risks of colonoscopy with older age as well as risks affiliated with blood transfusions   -GI recs--may plan for egd/cscope    *New A-fib  -Was started on Eliquis, but with worsening anemia, FOBT +, now holding eliquis (will also stop ASA for now, no hx of CAD or stroke, risk of bleeding on ASA + Eliquis > benefits)  -Cardio recs  -Echo: LVEF 55-60%, moderate to severe aortic stenosis, grade 3 diastolic dysfunction, severe pulm htn  -troponin negative   -d-dimer positive, would consider V/Q as pt had tachycardia and was recently on plane ride from [place], but EKG and echo not significant for RV strain, doppler LE negative for DVT, tachycardia resolved as asthma exacerbation improving    *Constipation  -Bowel regimen   - Having multiple BMs as per nursing    *Essential HTN  -c/w Norvasc, Clonidine    *Hypothyroidism  -c/w Synthroid  -TSH reviewed wnl     *Bilateral leg swelling  -improved  -US negative DVT (recent plane ride)  -Suspect from low albumen state  -does not appear to be in overt heart failure at this time despite elevated pro-BNP    *HLD  -c/w statin    *DM2  -hold oral meds (Metformin, Glimeperide)  -ISS  -Hgb A1c 7.2    *PREM vs CKD3  -Prior Cr 1.15 (2017) and 1.55 (2022)  -Unclear if we are dealing with PREM on CKD3, vs if this is the new baseline  -Hold Metformin  -Renal sono with no hydro    #DVT ppx: patient anemic, positive FOBT, holding anticoagulation     Dispo: pending GI for inpatient EGD.cscope. PT rec TU.     Case previously d/w patient's PMD, Dr. Merchant, [phone], who is involved in her care and would appreciate any updates  4/12 Updated Anna at     Code Status: DNR        89F (Gujarati-speaking) with HTN, asthma, DM2, hypothyroidism, recent hospitalization in [place] for "trouble breathing", returned from [facility] 5 days ago, comes to the ED with SOB, diagnosed with asthma exacerbation secondary to RSV    Asthma exacerbation  RSV infection  -CT chest non-contrast shows trace bilateral pleural effusions   -Continue steroid taper  -Albuterol nebs q6h prn  -Added Symbicort   -d-dimer 506, doppler US B/L LE negative for DVT  -considered V/Q scan (Cr elevated) as pt had tachycardia and was recently on plane ride from [place], but EKG and echo not significant for RV strain, doppler LE negative for DVT, tachycardia resolved as asthma exacerbation improving  -F/U blood cultures NGTD    *Anemia of chronic disease  *Positive FOBT  -s/p 1 unit PRBCs  -Hold anticoagulation   -Continue to monitor  -Low iron and folate, Continue supplements  -Per family, patient has never had a colonoscopy, but has normally low H/H levels. Per PCP Dr. Merchant patient's hemoglobin normally around 9  -At this time family would like to pursue whatever measures are necessary to help her, understand risks of colonoscopy with older age as well as risks affiliated with blood transfusions   -GI recs--may plan for egd/cscope    *New A-fib  -Was started on Eliquis, but with worsening anemia, FOBT +, now holding eliquis (will also stop ASA for now, no hx of CAD or stroke, risk of bleeding on ASA + Eliquis > benefits)  -Cardio recs  -Echo: LVEF 55-60%, moderate to severe aortic stenosis, grade 3 diastolic dysfunction, severe pulm htn  -troponin negative   -d-dimer positive, would consider V/Q as pt had tachycardia and was recently on plane ride from [place], but EKG and echo not significant for RV strain, doppler LE negative for DVT, tachycardia resolved as asthma exacerbation improving    *Constipation  -Bowel regimen   - Having multiple BMs as per nursing    *Essential HTN  -c/w Norvasc, Clonidine    *Hypothyroidism  -c/w Synthroid  -TSH reviewed wnl     *Bilateral leg swelling  -improved  -US negative DVT (recent plane ride)  -Suspect from low albumen state  -does not appear to be in overt heart failure at this time despite elevated pro-BNP    *HLD  -c/w statin    *DM2  -hold oral meds (Metformin, Glimeperide)  -ISS  -Hgb A1c 7.2    *PREM vs CKD3  -Prior Cr 1.15 (2017) and 1.55 (2022)  -Unclear if we are dealing with PREM on CKD3, vs if this is the new baseline  -Hold Metformin  -Renal sono with no hydro    #DVT ppx: patient anemic, positive FOBT, holding anticoagulation     Dispo: pending GI for inpatient EGD.cscope. PT rec TU.     Case previously d/w patient's PMD, Dr. Merchant, [phone], who is involved in her care and would appreciate any updates  4/12 Updated Anna at     Code Status: DNR        89F (Gujarati-speaking) with HTN, asthma, DM2, hypothyroidism, recent hospitalization in [place] for "trouble breathing", returned from [facility] 5 days ago, comes to the ED with SOB, diagnosed with asthma exacerbation secondary to RSV    Asthma exacerbation  RSV infection  -CT chest non-contrast shows trace bilateral pleural effusions   -Continue steroid taper  -Albuterol nebs q6h prn  -Added Symbicort   -d-dimer 506, doppler US B/L LE negative for DVT  -considered V/Q scan (Cr elevated) as pt had tachycardia and was recently on plane ride from [place], but EKG and echo not significant for RV strain, doppler LE negative for DVT, tachycardia resolved as asthma exacerbation improving  -F/U blood cultures NGTD    *Anemia of chronic disease  *Positive FOBT  -s/p 1 unit PRBCs  -Hold anticoagulation   -Continue to monitor  -Low iron and folate, Continue supplements  -Per family, patient has never had a colonoscopy, but has normally low H/H levels. Per PCP Dr. Merchant patient's hemoglobin normally around 9  -At this time family would like to pursue whatever measures are necessary to help her, understand risks of colonoscopy with older age as well as risks affiliated with blood transfusions   -GI recs--may plan for egd/cscope    *New A-fib  -Was started on Eliquis, but with worsening anemia, FOBT +, now holding eliquis (will also stop ASA for now, no hx of CAD or stroke, risk of bleeding on ASA + Eliquis > benefits)  -Cardio recs  -Echo: LVEF 55-60%, moderate to severe aortic stenosis, grade 3 diastolic dysfunction, severe pulm htn  -troponin negative   -d-dimer positive, would consider V/Q as pt had tachycardia and was recently on plane ride from [place], but EKG and echo not significant for RV strain, doppler LE negative for DVT, tachycardia resolved as asthma exacerbation improving    *Constipation  -Bowel regimen   - Having multiple BMs as per nursing    *Essential HTN  -c/w Norvasc, Clonidine    *Hypothyroidism  -c/w Synthroid  -TSH reviewed wnl     *Bilateral leg swelling  -improved  -US negative DVT (recent plane ride)  -Suspect from low albumen state  -does not appear to be in overt heart failure at this time despite elevated pro-BNP    *HLD  -c/w statin    *DM2  -hold oral meds (Metformin, Glimeperide)  -ISS  -Hgb A1c 7.2    *PREM vs CKD3  -Prior Cr 1.15 (2017) and 1.55 (2022)  -Unclear if we are dealing with PREM on CKD3, vs if this is the new baseline  -Hold Metformin  -Renal sono with no hydro    #DVT ppx: patient anemic, positive FOBT, holding anticoagulation     Dispo: pending GI for inpatient EGD.cscope. PT rec IVELISSE Ramirez is looking into [facility]    Case previously d/w patient's PMD, Dr. Merchant, [phone], who is involved in her care and would appreciate any updates  4/12 Updated daughter in law Anna at     Code Status: DNR        89F (Gujarati-speaking) with HTN, asthma, DM2, hypothyroidism, recent hospitalization in [place] for "trouble breathing", returned from [facility] 5 days ago, comes to the ED with SOB, diagnosed with asthma exacerbation secondary to RSV    Asthma exacerbation  RSV infection  -CT chest non-contrast shows trace bilateral pleural effusions   -Continue steroid taper  -Albuterol nebs q6h prn  -Added Symbicort   -d-dimer 506, doppler US B/L LE negative for DVT  -considered V/Q scan (Cr elevated) as pt had tachycardia and was recently on plane ride from [place], but EKG and echo not significant for RV strain, doppler LE negative for DVT, tachycardia resolved as asthma exacerbation improving  -F/U blood cultures NGTD    *Anemia of chronic disease  *Positive FOBT  -s/p 1 unit PRBCs  -Hold anticoagulation   -Continue to monitor  -Low iron and folate, Continue supplements  -Per family, patient has never had a colonoscopy, but has normally low H/H levels. Per PCP Dr. Merchant patient's hemoglobin normally around 9  -At this time family would like to pursue whatever measures are necessary to help her, understand risks of colonoscopy with older age as well as risks affiliated with blood transfusions   -GI recs--may plan for egd/cscope    *New A-fib  -Was started on Eliquis, but with worsening anemia, FOBT +, now holding eliquis (will also stop ASA for now, no hx of CAD or stroke, risk of bleeding on ASA + Eliquis > benefits)  -Cardio recs  -Echo: LVEF 55-60%, moderate to severe aortic stenosis, grade 3 diastolic dysfunction, severe pulm htn  -troponin negative   -d-dimer positive, would consider V/Q as pt had tachycardia and was recently on plane ride from [place], but EKG and echo not significant for RV strain, doppler LE negative for DVT, tachycardia resolved as asthma exacerbation improving    *Constipation  -Bowel regimen   - Having multiple BMs as per nursing    *Essential HTN  -c/w Norvasc, Clonidine    *Hypothyroidism  -c/w Synthroid  -TSH reviewed wnl     *Bilateral leg swelling  -improved  -US negative DVT (recent plane ride)  -Suspect from low albumen state  -does not appear to be in overt heart failure at this time despite elevated pro-BNP    *HLD  -c/w statin    *DM2  -hold oral meds (Metformin, Glimeperide)  -ISS  -Hgb A1c 7.2    *PREM vs CKD3  -Prior Cr 1.15 (2017) and 1.55 (2022)  -Unclear if we are dealing with PREM on CKD3, vs if this is the new baseline  -Hold Metformin  -Renal sono with no hydro    #DVT ppx: patient anemic, positive FOBT, holding anticoagulation     Dispo: pending GI for inpatient EGD.cscope. PT rec IVELISSE Ramirez is looking into [facility]    Case previously d/w patient's PMD, Dr. Merchant, [phone], who is involved in her care and would appreciate any updates  4/12 Updated daughter in law Anna at     Code Status: DNR        89F (Gujarati-speaking) with HTN, asthma, DM2, hypothyroidism, recent hospitalization in [place] for "trouble breathing", returned from [facility] 5 days ago, comes to the ED with SOB, diagnosed with asthma exacerbation secondary to RSV    Asthma exacerbation  RSV infection  -CT chest non-contrast shows trace bilateral pleural effusions   -Continue steroid taper  -Albuterol nebs q6h prn  -Added Symbicort   -d-dimer 506, doppler US B/L LE negative for DVT  -considered V/Q scan (Cr elevated) as pt had tachycardia and was recently on plane ride from [place], but EKG and echo not significant for RV strain, doppler LE negative for DVT, tachycardia resolved as asthma exacerbation improving  -F/U blood cultures NGTD    *Anemia of chronic disease  *Positive FOBT  -s/p 1 unit PRBCs  -Hold anticoagulation   -Continue to monitor  -Low iron and folate, Continue supplements  -Per family, patient has never had a colonoscopy, but has normally low H/H levels. Per PCP Dr. Merchant patient's hemoglobin normally around 9  -At this time family would like to pursue whatever measures are necessary to help her, understand risks of colonoscopy with older age as well as risks affiliated with blood transfusions   -GI recs--may plan for egd/cscope    *New A-fib  -Was started on Eliquis, but with worsening anemia, FOBT +, now holding eliquis (will also stop ASA for now, no hx of CAD or stroke, risk of bleeding on ASA + Eliquis > benefits)  -Cardio recs  -Echo: LVEF 55-60%, moderate to severe aortic stenosis, grade 3 diastolic dysfunction, severe pulm htn  -troponin negative   -d-dimer positive, would consider V/Q as pt had tachycardia and was recently on plane ride from [place], but EKG and echo not significant for RV strain, doppler LE negative for DVT, tachycardia resolved as asthma exacerbation improving    *Constipation  -Bowel regimen   - Having multiple BMs as per nursing    *Essential HTN  -c/w Norvasc, Clonidine    *Hypothyroidism  -c/w Synthroid  -TSH reviewed wnl     *Bilateral leg swelling  -improved  -US negative DVT (recent plane ride)  -Suspect from low albumen state  -does not appear to be in overt heart failure at this time despite elevated pro-BNP    *HLD  -c/w statin    *DM2  -hold oral meds (Metformin, Glimeperide)  -ISS  -Hgb A1c 7.2    *PREM vs CKD3  -Prior Cr 1.15 (2017) and 1.55 (2022)  -Unclear if we are dealing with PREM on CKD3, vs if this is the new baseline  -Hold Metformin  -Renal sono with no hydro    #DVT ppx: patient anemic, positive FOBT, holding anticoagulation     Dispo: pending GI for inpatient EGD.cscope. PT rec IVELISSE Ramirez is looking into [facility]    Case previously d/w patient's PMD, Dr. Merchant, [phone], who is involved in her care and would appreciate any updates  4/12 Updated daughter in law Anna at     Code Status: DNR

## 2023-04-12 NOTE — PROGRESS NOTE ADULT - ASSESSMENT
89F (Gujarati-speaking) with HTN, asthma, DM2, hypothyroidism, admitted with Asthma exacerbation from RSV.  H& H low and FOB positive S/P 1 Unit PRBC H &H stable.

## 2023-04-13 LAB
ANION GAP SERPL CALC-SCNC: 6 MMOL/L — SIGNIFICANT CHANGE UP (ref 5–17)
APTT BLD: 27.6 SEC — SIGNIFICANT CHANGE UP (ref 27.5–35.5)
APTT BLD: 71.6 SEC — HIGH (ref 27.5–35.5)
BLD GP AB SCN SERPL QL: SIGNIFICANT CHANGE UP
BUN SERPL-MCNC: 62 MG/DL — HIGH (ref 7–23)
CALCIUM SERPL-MCNC: 8.6 MG/DL — SIGNIFICANT CHANGE UP (ref 8.4–10.5)
CHLORIDE SERPL-SCNC: 93 MMOL/L — LOW (ref 96–108)
CO2 SERPL-SCNC: 33 MMOL/L — HIGH (ref 22–31)
CREAT SERPL-MCNC: 1.97 MG/DL — HIGH (ref 0.5–1.3)
EGFR: 24 ML/MIN/1.73M2 — LOW
GLUCOSE BLDC GLUCOMTR-MCNC: 154 MG/DL — HIGH (ref 70–99)
GLUCOSE BLDC GLUCOMTR-MCNC: 201 MG/DL — HIGH (ref 70–99)
GLUCOSE BLDC GLUCOMTR-MCNC: 209 MG/DL — HIGH (ref 70–99)
GLUCOSE BLDC GLUCOMTR-MCNC: 279 MG/DL — HIGH (ref 70–99)
GLUCOSE SERPL-MCNC: 194 MG/DL — HIGH (ref 70–99)
HCT VFR BLD CALC: 26.3 % — LOW (ref 34.5–45)
HCT VFR BLD CALC: 26.9 % — LOW (ref 34.5–45)
HGB BLD-MCNC: 8.5 G/DL — LOW (ref 11.5–15.5)
HGB BLD-MCNC: 8.7 G/DL — LOW (ref 11.5–15.5)
INR BLD: 0.97 RATIO — SIGNIFICANT CHANGE UP (ref 0.88–1.16)
MCHC RBC-ENTMCNC: 21.3 PG — LOW (ref 27–34)
MCHC RBC-ENTMCNC: 21.4 PG — LOW (ref 27–34)
MCHC RBC-ENTMCNC: 32.3 GM/DL — SIGNIFICANT CHANGE UP (ref 32–36)
MCV RBC AUTO: 65.8 FL — LOW (ref 80–100)
MCV RBC AUTO: 65.9 FL — LOW (ref 80–100)
MCV RBC AUTO: 66.1 FL — LOW (ref 80–100)
NRBC # BLD: 0 /100 WBCS — SIGNIFICANT CHANGE UP (ref 0–0)
PLATELET # BLD AUTO: 236 K/UL — SIGNIFICANT CHANGE UP (ref 150–400)
PLATELET # BLD AUTO: 254 K/UL — SIGNIFICANT CHANGE UP (ref 150–400)
PLATELET # BLD AUTO: 263 K/UL — SIGNIFICANT CHANGE UP (ref 150–400)
POTASSIUM SERPL-MCNC: 4.8 MMOL/L — SIGNIFICANT CHANGE UP (ref 3.5–5.3)
POTASSIUM SERPL-SCNC: 4.8 MMOL/L — SIGNIFICANT CHANGE UP (ref 3.5–5.3)
PROTHROM AB SERPL-ACNC: 11.3 SEC — SIGNIFICANT CHANGE UP (ref 10.5–13.4)
RBC # BLD: 4 M/UL — SIGNIFICANT CHANGE UP (ref 3.8–5.2)
RBC # BLD: 4.07 M/UL — SIGNIFICANT CHANGE UP (ref 3.8–5.2)
RBC # BLD: 4.08 M/UL — SIGNIFICANT CHANGE UP (ref 3.8–5.2)
RBC # FLD: 18.8 % — HIGH (ref 10.3–14.5)
RBC # FLD: 18.9 % — HIGH (ref 10.3–14.5)
SODIUM SERPL-SCNC: 132 MMOL/L — LOW (ref 135–145)
WBC # BLD: 11.1 K/UL — HIGH (ref 3.8–10.5)
WBC # BLD: 11.92 K/UL — HIGH (ref 3.8–10.5)
WBC # BLD: 7.56 K/UL — SIGNIFICANT CHANGE UP (ref 3.8–10.5)
WBC # FLD AUTO: 11.1 K/UL — HIGH (ref 3.8–10.5)
WBC # FLD AUTO: 11.92 K/UL — HIGH (ref 3.8–10.5)
WBC # FLD AUTO: 7.56 K/UL — SIGNIFICANT CHANGE UP (ref 3.8–10.5)

## 2023-04-13 PROCEDURE — 99233 SBSQ HOSP IP/OBS HIGH 50: CPT

## 2023-04-13 PROCEDURE — 71045 X-RAY EXAM CHEST 1 VIEW: CPT | Mod: 26

## 2023-04-13 RX ORDER — HEPARIN SODIUM 5000 [USP'U]/ML
INJECTION INTRAVENOUS; SUBCUTANEOUS
Qty: 25000 | Refills: 0 | Status: DISCONTINUED | OUTPATIENT
Start: 2023-04-13 | End: 2023-04-14

## 2023-04-13 RX ORDER — HEPARIN SODIUM 5000 [USP'U]/ML
3200 INJECTION INTRAVENOUS; SUBCUTANEOUS EVERY 6 HOURS
Refills: 0 | Status: DISCONTINUED | OUTPATIENT
Start: 2023-04-13 | End: 2023-04-14

## 2023-04-13 RX ORDER — HEPARIN SODIUM 5000 [USP'U]/ML
3200 INJECTION INTRAVENOUS; SUBCUTANEOUS ONCE
Refills: 0 | Status: COMPLETED | OUTPATIENT
Start: 2023-04-13 | End: 2023-04-13

## 2023-04-13 RX ORDER — INSULIN GLARGINE 100 [IU]/ML
5 INJECTION, SOLUTION SUBCUTANEOUS AT BEDTIME
Refills: 0 | Status: DISCONTINUED | OUTPATIENT
Start: 2023-04-13 | End: 2023-04-15

## 2023-04-13 RX ADMIN — HEPARIN SODIUM 650 UNIT(S)/HR: 5000 INJECTION INTRAVENOUS; SUBCUTANEOUS at 23:45

## 2023-04-13 RX ADMIN — Medication 4: at 08:13

## 2023-04-13 RX ADMIN — Medication 325 MILLIGRAM(S): at 12:16

## 2023-04-13 RX ADMIN — SODIUM ZIRCONIUM CYCLOSILICATE 10 GRAM(S): 10 POWDER, FOR SUSPENSION ORAL at 02:39

## 2023-04-13 RX ADMIN — BUDESONIDE AND FORMOTEROL FUMARATE DIHYDRATE 2 PUFF(S): 160; 4.5 AEROSOL RESPIRATORY (INHALATION) at 09:31

## 2023-04-13 RX ADMIN — AMLODIPINE BESYLATE 5 MILLIGRAM(S): 2.5 TABLET ORAL at 06:01

## 2023-04-13 RX ADMIN — HEPARIN SODIUM 3200 UNIT(S): 5000 INJECTION INTRAVENOUS; SUBCUTANEOUS at 16:12

## 2023-04-13 RX ADMIN — PANTOPRAZOLE SODIUM 40 MILLIGRAM(S): 20 TABLET, DELAYED RELEASE ORAL at 12:16

## 2023-04-13 RX ADMIN — Medication 0.2 MILLIGRAM(S): at 18:22

## 2023-04-13 RX ADMIN — SODIUM ZIRCONIUM CYCLOSILICATE 10 GRAM(S): 10 POWDER, FOR SUSPENSION ORAL at 12:16

## 2023-04-13 RX ADMIN — BUDESONIDE AND FORMOTEROL FUMARATE DIHYDRATE 2 PUFF(S): 160; 4.5 AEROSOL RESPIRATORY (INHALATION) at 21:45

## 2023-04-13 RX ADMIN — Medication 25 MICROGRAM(S): at 06:01

## 2023-04-13 RX ADMIN — INSULIN GLARGINE 5 UNIT(S): 100 INJECTION, SOLUTION SUBCUTANEOUS at 21:17

## 2023-04-13 RX ADMIN — SENNA PLUS 2 TABLET(S): 8.6 TABLET ORAL at 21:08

## 2023-04-13 RX ADMIN — Medication 6: at 12:21

## 2023-04-13 RX ADMIN — ATORVASTATIN CALCIUM 20 MILLIGRAM(S): 80 TABLET, FILM COATED ORAL at 21:09

## 2023-04-13 RX ADMIN — Medication 0.2 MILLIGRAM(S): at 06:00

## 2023-04-13 RX ADMIN — HEPARIN SODIUM 650 UNIT(S)/HR: 5000 INJECTION INTRAVENOUS; SUBCUTANEOUS at 16:24

## 2023-04-13 RX ADMIN — Medication 1 MILLIGRAM(S): at 12:16

## 2023-04-13 RX ADMIN — Medication 4: at 17:17

## 2023-04-13 NOTE — PROGRESS NOTE ADULT - SUBJECTIVE AND OBJECTIVE BOX
Patient is a 89y old  Female who presents with a chief complaint of Shortness of Breath (12 Apr 2023 20:04)      Patient seen and examined at bedside.    ALLERGIES:  No Known Allergies    MEDICATIONS  (STANDING):  albuterol    90 MICROgram(s) HFA Inhaler 1 Puff(s) Inhalation every 4 hours  amLODIPine   Tablet 5 milliGRAM(s) Oral daily  atorvastatin 20 milliGRAM(s) Oral at bedtime  budesonide  80 MICROgram(s)/formoterol 4.5 MICROgram(s) Inhaler 2 Puff(s) Inhalation two times a day  cloNIDine 0.2 milliGRAM(s) Oral two times a day  dextrose 5%. 1000 milliLiter(s) (50 mL/Hr) IV Continuous <Continuous>  dextrose 5%. 1000 milliLiter(s) (100 mL/Hr) IV Continuous <Continuous>  dextrose 50% Injectable 25 Gram(s) IV Push once  dextrose 50% Injectable 12.5 Gram(s) IV Push once  dextrose 50% Injectable 25 Gram(s) IV Push once  ferrous    sulfate 325 milliGRAM(s) Oral daily  folic acid 1 milliGRAM(s) Oral daily  glucagon  Injectable 1 milliGRAM(s) IntraMuscular once  insulin lispro (ADMELOG) corrective regimen sliding scale   SubCutaneous three times a day before meals  insulin lispro (ADMELOG) corrective regimen sliding scale   SubCutaneous at bedtime  levothyroxine 25 MICROGram(s) Oral daily  pantoprazole   Suspension 40 milliGRAM(s) Oral daily  senna 2 Tablet(s) Oral at bedtime  sodium zirconium cyclosilicate 10 Gram(s) Oral every 8 hours    MEDICATIONS  (PRN):  acetaminophen     Tablet .. 650 milliGRAM(s) Oral every 6 hours PRN Temp greater or equal to 38C (100.4F), Mild Pain (1 - 3)  albuterol    0.083% 2.5 milliGRAM(s) Nebulizer every 6 hours PRN Shortness of Breath and/or Wheezing  aluminum hydroxide/magnesium hydroxide/simethicone Suspension 30 milliLiter(s) Oral every 4 hours PRN Dyspepsia  dextrose Oral Gel 15 Gram(s) Oral once PRN Blood Glucose LESS THAN 70 milliGRAM(s)/deciliter  melatonin 3 milliGRAM(s) Oral at bedtime PRN Insomnia  ondansetron Injectable 4 milliGRAM(s) IV Push every 8 hours PRN Nausea and/or Vomiting  polyethylene glycol 3350 17 Gram(s) Oral daily PRN Constipation    Vital Signs Last 24 Hrs  T(F): 97.5 (13 Apr 2023 05:45), Max: 98.1 (12 Apr 2023 15:14)  HR: 64 (13 Apr 2023 05:45) (64 - 100)  BP: 141/99 (13 Apr 2023 05:45) (134/80 - 149/75)  RR: 18 (13 Apr 2023 05:45) (18 - 19)  SpO2: 100% (13 Apr 2023 05:45) (96% - 100%)  I&O's Summary    12 Apr 2023 07:01  -  13 Apr 2023 07:00  --------------------------------------------------------  IN: 900 mL / OUT: 1700 mL / NET: -800 mL      PHYSICAL EXAM:  General: NAD, A/O x 3  ENT: MMM  Neck: Supple, No JVD  Lungs: Clear to auscultation bilaterally, Non labored breathing   Cardio: RRR, S1/S2, No murmurs  Abdomen: Soft, Nontender, Nondistended; Bowel sounds present  Extremities: No calf tenderness, No pitting edema    LABS:                        9.3    9.86  )-----------( 264      ( 12 Apr 2023 06:09 )             29.1     04-12    129  |  93  |  59  ----------------------------<  81  5.6   |  29  |  2.01    Ca    8.5      12 Apr 2023 06:09                              POCT Blood Glucose.: 297 mg/dL (12 Apr 2023 22:21)  POCT Blood Glucose.: 243 mg/dL (12 Apr 2023 16:52)  POCT Blood Glucose.: 276 mg/dL (12 Apr 2023 12:25)  POCT Blood Glucose.: 157 mg/dL (12 Apr 2023 08:27)          Culture - Urine (collected 07 Apr 2023 18:55)  Source: Clean Catch Clean Catch (Midstream)  Final Report (09 Apr 2023 11:03):    >=3 organisms. Probable collection contamination.    Culture - Blood (collected 07 Apr 2023 15:30)  Source: .Blood Blood-Peripheral  Final Report (12 Apr 2023 22:00):    No Growth Final    Culture - Blood (collected 07 Apr 2023 15:30)  Source: .Blood Blood-Peripheral  Final Report (12 Apr 2023 22:00):    No Growth Final      COVID-19 PCR: NotDetec (04-12-23 @ 23:12)    RADIOLOGY & ADDITIONAL TESTS:    Care Discussed with Consultants/Other Providers:    Patient is a 89y old  Female who presents with a chief complaint of Shortness of Breath (12 Apr 2023 20:04)      Patient seen and examined at bedside.  Pt with complaint of "abdominal  tired" after eating only a few bites this am .    Interperter Dr Virgen- Pt speaks Sunita     ALLERGIES:  No Known Allergies    MEDICATIONS  (STANDING):  albuterol    90 MICROgram(s) HFA Inhaler 1 Puff(s) Inhalation every 4 hours  amLODIPine   Tablet 5 milliGRAM(s) Oral daily  atorvastatin 20 milliGRAM(s) Oral at bedtime  budesonide  80 MICROgram(s)/formoterol 4.5 MICROgram(s) Inhaler 2 Puff(s) Inhalation two times a day  cloNIDine 0.2 milliGRAM(s) Oral two times a day  dextrose 5%. 1000 milliLiter(s) (50 mL/Hr) IV Continuous <Continuous>  dextrose 5%. 1000 milliLiter(s) (100 mL/Hr) IV Continuous <Continuous>  dextrose 50% Injectable 25 Gram(s) IV Push once  dextrose 50% Injectable 12.5 Gram(s) IV Push once  dextrose 50% Injectable 25 Gram(s) IV Push once  ferrous    sulfate 325 milliGRAM(s) Oral daily  folic acid 1 milliGRAM(s) Oral daily  glucagon  Injectable 1 milliGRAM(s) IntraMuscular once  insulin lispro (ADMELOG) corrective regimen sliding scale   SubCutaneous three times a day before meals  insulin lispro (ADMELOG) corrective regimen sliding scale   SubCutaneous at bedtime  levothyroxine 25 MICROGram(s) Oral daily  pantoprazole   Suspension 40 milliGRAM(s) Oral daily  senna 2 Tablet(s) Oral at bedtime  sodium zirconium cyclosilicate 10 Gram(s) Oral every 8 hours    MEDICATIONS  (PRN):  acetaminophen     Tablet .. 650 milliGRAM(s) Oral every 6 hours PRN Temp greater or equal to 38C (100.4F), Mild Pain (1 - 3)  albuterol    0.083% 2.5 milliGRAM(s) Nebulizer every 6 hours PRN Shortness of Breath and/or Wheezing  aluminum hydroxide/magnesium hydroxide/simethicone Suspension 30 milliLiter(s) Oral every 4 hours PRN Dyspepsia  dextrose Oral Gel 15 Gram(s) Oral once PRN Blood Glucose LESS THAN 70 milliGRAM(s)/deciliter  melatonin 3 milliGRAM(s) Oral at bedtime PRN Insomnia  ondansetron Injectable 4 milliGRAM(s) IV Push every 8 hours PRN Nausea and/or Vomiting  polyethylene glycol 3350 17 Gram(s) Oral daily PRN Constipation    Vital Signs Last 24 Hrs  T(F): 97.5 (13 Apr 2023 05:45), Max: 98.1 (12 Apr 2023 15:14)  HR: 64 (13 Apr 2023 05:45) (64 - 100)  BP: 141/99 (13 Apr 2023 05:45) (134/80 - 149/75)  RR: 18 (13 Apr 2023 05:45) (18 - 19)  SpO2: 100% (13 Apr 2023 05:45) (96% - 100%)  I&O's Summary    12 Apr 2023 07:01  -  13 Apr 2023 07:00  --------------------------------------------------------  IN: 900 mL / OUT: 1700 mL / NET: -800 mL      PHYSICAL EXAM:  General: 89 y/o female in NAD, A/O x 3 speaks Sunita   ENT: MMM  Neck: Supple, No JVD  Lungs: Course BS  to auscultation bilaterally, Non labored breathing   Cardio: RRR, S1/S2, No murmurs  Abdomen: Soft, Nontender, Nondistended; Bowel sounds present  Extremities: No calf tenderness, No pitting edema    LABS:                        9.3    9.86  )-----------( 264      ( 12 Apr 2023 06:09 )             29.1     04-12    129  |  93  |  59  ----------------------------<  81  5.6   |  29  |  2.01    Ca    8.5      12 Apr 2023 06:09                              POCT Blood Glucose.: 297 mg/dL (12 Apr 2023 22:21)  POCT Blood Glucose.: 243 mg/dL (12 Apr 2023 16:52)  POCT Blood Glucose.: 276 mg/dL (12 Apr 2023 12:25)  POCT Blood Glucose.: 157 mg/dL (12 Apr 2023 08:27)          Culture - Urine (collected 07 Apr 2023 18:55)  Source: Clean Catch Clean Catch (Midstream)  Final Report (09 Apr 2023 11:03):    >=3 organisms. Probable collection contamination.    Culture - Blood (collected 07 Apr 2023 15:30)  Source: .Blood Blood-Peripheral  Final Report (12 Apr 2023 22:00):    No Growth Final    Culture - Blood (collected 07 Apr 2023 15:30)  Source: .Blood Blood-Peripheral  Final Report (12 Apr 2023 22:00):    No Growth Final      COVID-19 PCR: NotDetec (04-12-23 @ 23:12)    RADIOLOGY & ADDITIONAL TESTS:    Care Discussed with Consultants/Other Providers:    Patient is a 89y old  Female who presents with a chief complaint of Shortness of Breath (12 Apr 2023 20:04)      Patient seen and examined at bedside.  Pt with complaint of "abdominal  tired" after eating only a few bites this am .    Interperter Dr Virgen- Pt speaks Sunita     ALLERGIES:  No Known Allergies    MEDICATIONS  (STANDING):  albuterol    90 MICROgram(s) HFA Inhaler 1 Puff(s) Inhalation every 4 hours  amLODIPine   Tablet 5 milliGRAM(s) Oral daily  atorvastatin 20 milliGRAM(s) Oral at bedtime  budesonide  80 MICROgram(s)/formoterol 4.5 MICROgram(s) Inhaler 2 Puff(s) Inhalation two times a day  cloNIDine 0.2 milliGRAM(s) Oral two times a day  dextrose 5%. 1000 milliLiter(s) (50 mL/Hr) IV Continuous <Continuous>  dextrose 5%. 1000 milliLiter(s) (100 mL/Hr) IV Continuous <Continuous>  dextrose 50% Injectable 25 Gram(s) IV Push once  dextrose 50% Injectable 12.5 Gram(s) IV Push once  dextrose 50% Injectable 25 Gram(s) IV Push once  ferrous    sulfate 325 milliGRAM(s) Oral daily  folic acid 1 milliGRAM(s) Oral daily  glucagon  Injectable 1 milliGRAM(s) IntraMuscular once  insulin lispro (ADMELOG) corrective regimen sliding scale   SubCutaneous three times a day before meals  insulin lispro (ADMELOG) corrective regimen sliding scale   SubCutaneous at bedtime  levothyroxine 25 MICROGram(s) Oral daily  pantoprazole   Suspension 40 milliGRAM(s) Oral daily  senna 2 Tablet(s) Oral at bedtime  sodium zirconium cyclosilicate 10 Gram(s) Oral every 8 hours    MEDICATIONS  (PRN):  acetaminophen     Tablet .. 650 milliGRAM(s) Oral every 6 hours PRN Temp greater or equal to 38C (100.4F), Mild Pain (1 - 3)  albuterol    0.083% 2.5 milliGRAM(s) Nebulizer every 6 hours PRN Shortness of Breath and/or Wheezing  aluminum hydroxide/magnesium hydroxide/simethicone Suspension 30 milliLiter(s) Oral every 4 hours PRN Dyspepsia  dextrose Oral Gel 15 Gram(s) Oral once PRN Blood Glucose LESS THAN 70 milliGRAM(s)/deciliter  melatonin 3 milliGRAM(s) Oral at bedtime PRN Insomnia  ondansetron Injectable 4 milliGRAM(s) IV Push every 8 hours PRN Nausea and/or Vomiting  polyethylene glycol 3350 17 Gram(s) Oral daily PRN Constipation    Vital Signs Last 24 Hrs  T(F): 97.5 (13 Apr 2023 05:45), Max: 98.1 (12 Apr 2023 15:14)  HR: 64 (13 Apr 2023 05:45) (64 - 100)  BP: 141/99 (13 Apr 2023 05:45) (134/80 - 149/75)  RR: 18 (13 Apr 2023 05:45) (18 - 19)  SpO2: 100% (13 Apr 2023 05:45) (96% - 100%)  I&O's Summary    12 Apr 2023 07:01  -  13 Apr 2023 07:00  --------------------------------------------------------  IN: 900 mL / OUT: 1700 mL / NET: -800 mL      PHYSICAL EXAM:  General: 88 y/o female in NAD, A/O x 3 speaks Suinta   ENT: MMM  Neck: Supple, No JVD  Lungs: Course BS  to auscultation bilaterally, Non labored breathing   Cardio: RRR, S1/S2, No murmurs  Abdomen: Soft, Nontender, Nondistended; Bowel sounds present  Extremities: No calf tenderness, No pitting edema    LABS:                        9.3    9.86  )-----------( 264      ( 12 Apr 2023 06:09 )             29.1     04-12    129  |  93  |  59  ----------------------------<  81  5.6   |  29  |  2.01    Ca    8.5      12 Apr 2023 06:09                              POCT Blood Glucose.: 297 mg/dL (12 Apr 2023 22:21)  POCT Blood Glucose.: 243 mg/dL (12 Apr 2023 16:52)  POCT Blood Glucose.: 276 mg/dL (12 Apr 2023 12:25)  POCT Blood Glucose.: 157 mg/dL (12 Apr 2023 08:27)          Culture - Urine (collected 07 Apr 2023 18:55)  Source: Clean Catch Clean Catch (Midstream)  Final Report (09 Apr 2023 11:03):    >=3 organisms. Probable collection contamination.    Culture - Blood (collected 07 Apr 2023 15:30)  Source: .Blood Blood-Peripheral  Final Report (12 Apr 2023 22:00):    No Growth Final    Culture - Blood (collected 07 Apr 2023 15:30)  Source: .Blood Blood-Peripheral  Final Report (12 Apr 2023 22:00):    No Growth Final      COVID-19 PCR: NotDetec (04-12-23 @ 23:12)    RADIOLOGY & ADDITIONAL TESTS:    Care Discussed with Consultants/Other Providers:

## 2023-04-13 NOTE — PROGRESS NOTE ADULT - SUBJECTIVE AND OBJECTIVE BOX
INTERVAL HPI/OVERNIGHT EVENTS:  HPI:    88 y/o female admitted with SOB. Patient seen and examined.     MEDICATIONS  (STANDING):  albuterol    90 MICROgram(s) HFA Inhaler 1 Puff(s) Inhalation every 4 hours  amLODIPine   Tablet 5 milliGRAM(s) Oral daily  atorvastatin 20 milliGRAM(s) Oral at bedtime  budesonide  80 MICROgram(s)/formoterol 4.5 MICROgram(s) Inhaler 2 Puff(s) Inhalation two times a day  cloNIDine 0.2 milliGRAM(s) Oral two times a day  dextrose 5%. 1000 milliLiter(s) (50 mL/Hr) IV Continuous <Continuous>  dextrose 5%. 1000 milliLiter(s) (100 mL/Hr) IV Continuous <Continuous>  dextrose 50% Injectable 25 Gram(s) IV Push once  dextrose 50% Injectable 12.5 Gram(s) IV Push once  dextrose 50% Injectable 25 Gram(s) IV Push once  ferrous    sulfate 325 milliGRAM(s) Oral daily  folic acid 1 milliGRAM(s) Oral daily  glucagon  Injectable 1 milliGRAM(s) IntraMuscular once  insulin glargine Injectable (LANTUS) 5 Unit(s) SubCutaneous at bedtime  insulin lispro (ADMELOG) corrective regimen sliding scale   SubCutaneous three times a day before meals  insulin lispro (ADMELOG) corrective regimen sliding scale   SubCutaneous at bedtime  levothyroxine 25 MICROGram(s) Oral daily  pantoprazole   Suspension 40 milliGRAM(s) Oral daily  senna 2 Tablet(s) Oral at bedtime  sodium zirconium cyclosilicate 10 Gram(s) Oral every 8 hours    MEDICATIONS  (PRN):  acetaminophen     Tablet .. 650 milliGRAM(s) Oral every 6 hours PRN Temp greater or equal to 38C (100.4F), Mild Pain (1 - 3)  albuterol    0.083% 2.5 milliGRAM(s) Nebulizer every 6 hours PRN Shortness of Breath and/or Wheezing  aluminum hydroxide/magnesium hydroxide/simethicone Suspension 30 milliLiter(s) Oral every 4 hours PRN Dyspepsia  dextrose Oral Gel 15 Gram(s) Oral once PRN Blood Glucose LESS THAN 70 milliGRAM(s)/deciliter  melatonin 3 milliGRAM(s) Oral at bedtime PRN Insomnia  ondansetron Injectable 4 milliGRAM(s) IV Push every 8 hours PRN Nausea and/or Vomiting  polyethylene glycol 3350 17 Gram(s) Oral daily PRN Constipation      Allergies    No Known Allergies    Intolerances        PAST MEDICAL & SURGICAL HISTORY:  Moderate asthma      Hypertension      PHYSICAL EXAM:   Vital Signs:  Vital Signs Last 24 Hrs  T(C): 36.4 (13 Apr 2023 05:45), Max: 36.7 (12 Apr 2023 15:14)  T(F): 97.5 (13 Apr 2023 05:45), Max: 98.1 (12 Apr 2023 15:14)  HR: 64 (13 Apr 2023 05:45) (64 - 100)  BP: 141/99 (13 Apr 2023 05:45) (134/80 - 149/75)  BP(mean): --  RR: 18 (13 Apr 2023 05:45) (18 - 19)  SpO2: 98% (13 Apr 2023 11:53) (96% - 100%)    Parameters below as of 13 Apr 2023 05:45  Patient On (Oxygen Delivery Method): room air      Daily     Daily I&O's Summary    12 Apr 2023 07:01  -  13 Apr 2023 07:00  --------------------------------------------------------  IN: 900 mL / OUT: 1700 mL / NET: -800 mL        GENERAL:  Appears stated age,  HEENT:  NC/AT,  conjunctivae clear and pink,  CHEST:  Full & symmetric excursion, no increased effort, breath sounds clear  HEART:  Regular rhythm, S1, S2, no murmur  ABDOMEN:  Soft, non-tender, non-distended, normoactive bowel sounds,   EXTEREMITIES:  no edema  SKIN:  No rash/warm/dry  NEURO:  Alert,       LABS:                        8.5    7.56  )-----------( 236      ( 13 Apr 2023 07:00 )             26.3     04-13    132<L>  |  93<L>  |  62<H>  ----------------------------<  194<H>  4.8   |  33<H>  |  1.97<H>    Ca    8.6      13 Apr 2023 07:00          amylase   lipase  RADIOLOGY & ADDITIONAL TESTS:

## 2023-04-13 NOTE — PROGRESS NOTE ADULT - NS ATTEND OPT1A GEN_ALL_CORE
Exam/Medical decision making
Exam/Medical decision making
History/Exam/Medical decision making
Exam/Medical decision making

## 2023-04-13 NOTE — PROGRESS NOTE ADULT - NS ATTEND AMEND GEN_ALL_CORE FT
no distress  eating breakfast slowly when seen  GI recommends to start AC for a.fib  heparin infusion ordered  CKD IV  avoid nephrotoxics. monitor intake and output.   anemia - hb stable  f/up CBC  PPI daily  VQ scan for elevated d-dimer  patient with multiple co-morbid conditions; higher risk for future complications despite optimal medical therapy   updated [facility]  [facility] no distress  eating breakfast slowly when seen  GI recommends to start AC for a.fib  heparin infusion ordered  CKD IV  avoid nephrotoxics. monitor intake and output.   anemia - hb stable  f/up CBC  PPI daily  VQ scan for elevated d-dimer  patient with multiple co-morbid conditions; higher risk for future complications despite optimal medical therapy   [facility] distress  eating breakfast slowly when seen  GI recommends to start AC for a.fib  heparin infusion ordered  CKD IV  avoid nephrotoxics. monitor intake and output.   anemia - hb stable  f/up CBC  PPI daily  VQ scan for elevated d-dimer  patient with multiple co-morbid conditions; higher risk for future complications despite optimal medical therapy   [facility]

## 2023-04-13 NOTE — PROGRESS NOTE ADULT - ASSESSMENT
89F (Gujarati-speaking) with HTN, asthma, DM2, hypothyroidism, recent hospitalization in [place] for "trouble breathing", returned from [facility] 5 days ago, comes to the ED with SOB, diagnosed with asthma exacerbation secondary to RSV    Asthma exacerbation  RSV infection  -CT chest non-contrast shows trace bilateral pleural effusions   -Continue steroid taper  -Albuterol nebs q6h prn  -Added Symbicort   -d-dimer 506, doppler US B/L LE negative for DVT  -considered V/Q scan (Cr elevated) as pt had tachycardia and was recently on plane ride from [place], but EKG and echo not significant for RV strain, doppler LE negative for DVT, tachycardia resolved as asthma exacerbation improving  -F/U blood cultures NGTD    *Anemia of chronic disease  *Positive FOBT  -s/p 1 unit PRBCs  -Hold anticoagulation   -Continue to monitor  -Low iron and folate, Continue supplements  -Per family, patient has never had a colonoscopy, but has normally low H/H levels. Per PCP Dr. Merchant patient's hemoglobin normally around 9  -At this time family would like to pursue whatever measures are necessary to help her, understand risks of colonoscopy with older age as well as risks affiliated with blood transfusions   -GI recs--may plan for egd/cscope    *New A-fib  -Was started on Eliquis, but with worsening anemia, FOBT +, now holding eliquis (will also stop ASA for now, no hx of CAD or stroke, risk of bleeding on ASA + Eliquis > benefits)  -Cardio recs  -Echo: LVEF 55-60%, moderate to severe aortic stenosis, grade 3 diastolic dysfunction, severe pulm htn  -troponin negative   -d-dimer positive, would consider V/Q as pt had tachycardia and was recently on plane ride from [place], but EKG and echo not significant for RV strain, doppler LE negative for DVT, tachycardia resolved as asthma exacerbation improving    *Constipation  -Bowel regimen   - Having multiple BMs as per nursing    *Essential HTN  -c/w Norvasc, Clonidine    *Hypothyroidism  -c/w Synthroid  -TSH reviewed wnl     *Bilateral leg swelling  -improved  -US negative DVT (recent plane ride)  -Suspect from low albumen state  -does not appear to be in overt heart failure at this time despite elevated pro-BNP    *HLD  -c/w statin    *DM2  -hold oral meds (Metformin, Glimeperide)  -ISS  -Hgb A1c 7.2    *PREM vs CKD3  -Prior Cr 1.15 (2017) and 1.55 (2022)  -Unclear if we are dealing with PREM on CKD3, vs if this is the new baseline  -Hold Metformin  -Renal sono with no hydro    #DVT ppx: patient anemic, positive FOBT, holding anticoagulation     Dispo: pending GI for inpatient EGD.cscope. PT rec IVELISSE Ramirez is looking into [facility]    Case previously d/w patient's PMD, Dr. Merchant, [phone], who is involved in her care and would appreciate any updates  4/12 Updated daughter in law Anna at     Code Status: DNR        89F (Gujarati-speaking) with HTN, asthma, DM2, hypothyroidism, recent hospitalization in [place] for "trouble breathing", returned from [facility] 5 days ago, comes to the ED with SOB, diagnosed with asthma exacerbation secondary to RSV    Asthma exacerbation  RSV infection  -CT chest non-contrast shows trace bilateral pleural effusions   -Continue steroid taper  -Albuterol nebs q6h prn  -Added Symbicort   -d-dimer 506, doppler US B/L LE negative for DVT  -considered V/Q scan (Cr elevated) as pt had tachycardia and was recently on plane ride from [place], but EKG and echo not significant for RV strain, doppler LE negative for DVT, tachycardia resolved as asthma exacerbation improving  -F/U blood cultures NGTD    *Anemia of chronic disease  *Positive FOBT  -s/p 1 unit PRBCs  -Hold anticoagulation   -Continue to monitor  -Low iron and folate, Continue supplements  -Per family, patient has never had a colonoscopy, but has normally low H/H levels. Per PCP Dr. Merchant patient's hemoglobin normally around 9  -At this time family would like to pursue whatever measures are necessary to help her, understand risks of colonoscopy with older age as well as risks affiliated with blood transfusions   -GI recs--may plan for egd/cscope    *New A-fib  -Was started on Eliquis, but with worsening anemia, FOBT +, now holding eliquis (will also stop ASA for now, no hx of CAD or stroke, risk of bleeding on ASA + Eliquis > benefits)  -Cardio recs  -Echo: LVEF 55-60%, moderate to severe aortic stenosis, grade 3 diastolic dysfunction, severe pulm htn  -troponin negative   -d-dimer positive, would consider V/Q as pt had tachycardia and was recently on plane ride from [place], but EKG and echo not significant for RV strain, doppler LE negative for DVT, tachycardia resolved as asthma exacerbation improving    *Constipation  -Bowel regimen   - Having multiple BMs as per nursing    *Essential HTN  -c/w Norvasc, Clonidine    *Hypothyroidism  -c/w Synthroid  -TSH reviewed wnl     *Bilateral leg swelling  -improved  -US negative DVT (recent plane ride)  -Suspect from low albumen state  -does not appear to be in overt heart failure at this time despite elevated pro-BNP    *HLD  -c/w statin    *DM2  -hold oral meds (Metformin, Glimeperide)  -ISS  -Hgb A1c 7.2    *PREM vs CKD3  -Prior Cr 1.15 (2017) and 1.55 (2022)  -Unclear if we are dealing with PREM on CKD3, vs if this is the new baseline  -Hold Metformin  -Renal sono with no hydro    #DVT ppx: patient anemic, positive FOBT, holding anticoagulation     Dispo: pending GI for inpatient EGD.cscope. PT rec IVELISSE Ramirez is looking into [facility]    Case previously d/w patient's PMD, Dr. Merchant, [phone], who is involved in her care and would appreciate any updates  4/12 Updated daughter in law Anna at     Code Status: DNR        89F (Gujarati-speaking) with HTN, asthma, DM2, hypothyroidism, recent hospitalization in [place] for "trouble breathing", returned from [facility] 5 days ago, comes to the ED with SOB, diagnosed with asthma exacerbation secondary to RSV    Asthma exacerbation  RSV infection  -CT chest non-contrast shows trace bilateral pleural effusions   -Continue steroid taper  -Albuterol nebs q6h prn  -Added Symbicort   -d-dimer 506, doppler US B/L LE negative for DVT  -considered V/Q scan (Cr elevated) as pt had tachycardia and was recently on plane ride from [place], but EKG and echo not significant for RV strain, doppler LE negative for DVT, tachycardia resolved as asthma exacerbation improving  -F/U blood cultures NGTD    *Anemia of chronic disease  *Positive FOBT  -s/p 1 unit PRBCs  -Hold anticoagulation   -Continue to monitor  -Low iron and folate, Continue supplements  -Per family, patient has never had a colonoscopy, but has normally low H/H levels. Per PCP Dr. Merchant patient's hemoglobin normally around 9  -At this time family would like to pursue whatever measures are necessary to help her, understand risks of colonoscopy with older age as well as risks affiliated with blood transfusions   -GI recs--may plan for egd/cscope    *New A-fib  -Was started on Eliquis, but with worsening anemia, FOBT +, now holding eliquis (will also stop ASA for now, no hx of CAD or stroke, risk of bleeding on ASA + Eliquis > benefits)  -Cardio recs  -Echo: LVEF 55-60%, moderate to severe aortic stenosis, grade 3 diastolic dysfunction, severe pulm htn  -troponin negative   -d-dimer positive, would consider V/Q as pt had tachycardia and was recently on plane ride from [place], but EKG and echo not significant for RV strain, doppler LE negative for DVT, tachycardia resolved as asthma exacerbation improving    *Constipation  -Bowel regimen   - Having multiple BMs as per nursing    *Essential HTN  -c/w Norvasc, Clonidine    *Hypothyroidism  -c/w Synthroid  -TSH reviewed wnl     *Bilateral leg swelling  -improved  -US negative DVT (recent plane ride)  -Suspect from low albumen state  -does not appear to be in overt heart failure at this time despite elevated pro-BNP    *HLD  -c/w statin    *DM2  -hold oral meds (Metformin, Glimeperide)  -ISS  -Hgb A1c 7.2    *PREM vs CKD3  -Prior Cr 1.15 (2017) and 1.55 (2022)  -Unclear if we are dealing with PREM on CKD3, vs if this is the new baseline  -Hold Metformin  -Renal sono with no hydro    #DVT ppx: patient anemic, positive FOBT, holding anticoagulation     Dispo: pending GI for inpatient EGD.cscope. PT rec IVELISSE Ramirez is looking into [facility]    Case previously d/w patient's PMD, Dr. Merchant, [phone], who is involved in her care and would appreciate any updates  4/12 Updated daughter in law Anna at     Code Status: DNR        89F (Gujarati-speaking) with HTN, asthma, DM2, hypothyroidism, recent hospitalization in [place] for "trouble breathing", returned from [place] 5 days ago, comes to the ED with SOB, diagnosed with asthma exacerbation secondary to RSV    Asthma exacerbation  RSV infection  -CT chest non-contrast shows trace bilateral pleural effusions   -Continue steroid taper  -Albuterol nebs q6h prn  -Added Symbicort   -d-dimer 506, doppler US B/L LE negative for DVT  -considered V/Q scan (Cr elevated) as pt had tachycardia and was recently on plane ride from [place], but EKG and echo not significant for RV strain, doppler LE negative for DVT, tachycardia resolved as asthma exacerbation improving  -F/U blood cultures NGTD    *Anemia of chronic disease  *Positive FOBT  -s/p 1 unit PRBCs- 4/9/23  -consider restarting AC and monitor H/H for further drop in H/H  - GI not considering Endo/colonoscopy at this time, consider restarting AC and monitor H/H   -Continue to monitor  -Low iron and folate, Continue supplements  -Per family, patient has never had a colonoscopy, but has normally low H/H levels. Per PCP Dr. Merchant patient's hemoglobin normally around 9  -At this time family would like to pursue whatever measures are necessary to help her, understand risks of colonoscopy with older age as well as risks affiliated with blood transfusions   -GI recs--may plan for egd/cscope    *New A-fib  -Was started on Eliquis, but with worsening anemia, FOBT +, now holding eliquis (will also stop ASA for now, no hx of CAD or stroke, risk of bleeding on ASA + Eliquis > benefits)  -Cardio recs  -Echo: LVEF 55-60%, moderate to severe aortic stenosis, grade 3 diastolic dysfunction, severe pulm htn  -troponin negative   -d-dimer positive, would consider V/Q as pt had tachycardia and was recently on plane ride from [place], but EKG and echo not significant for RV strain, doppler LE negative for DVT, tachycardia resolved as asthma exacerbation improving    *Constipation  -Bowel regimen   - Having multiple BMs as per nursing    *Essential HTN  -c/w Norvasc, Clonidine    *Hypothyroidism  -c/w Synthroid  -TSH reviewed wnl     *Bilateral leg swelling  -improved  -US negative DVT (recent plane ride)  -Suspect from low albumen state  -does not appear to be in overt heart failure at this time despite elevated pro-BNP    *HLD  -c/w statin    *DM2  -hold oral meds (Metformin, Glimeperide)  -ISS  -Hgb A1c 7.2    *PREM vs CKD3  -Prior Cr 1.15 (2017) and 1.55 (2022)  -Unclear if we are dealing with PREM on CKD3, vs if this is the new baseline  -Hold Metformin  -Renal sono with no hydro    #DVT ppx: patient anemic, positive FOBT, holding anticoagulation     Dispo: pending GI for inpatient EGD.cscope. PT rec IVELISSE Ramirez is looking into [facility]    Case previously d/w patient's PMD, Dr. Merchant, [phone], who is involved in her care and would appreciate any updates  4/12 Updated daughter in law Castillo at     Code Status: DNR        89F (Gujarati-speaking) with HTN, asthma, DM2, hypothyroidism, recent hospitalization in [place] for "trouble breathing", returned from [place] 5 days ago, comes to the ED with SOB, diagnosed with asthma exacerbation secondary to RSV    Asthma exacerbation  RSV infection  -CT chest non-contrast shows trace bilateral pleural effusions   -Continue steroid taper  -Albuterol nebs q6h prn  -Added Symbicort   -d-dimer 506, doppler US B/L LE negative for DVT  -considered V/Q scan (Cr elevated) as pt had tachycardia and was recently on plane ride from [place], but EKG and echo not significant for RV strain, doppler LE negative for DVT, tachycardia resolved as asthma exacerbation improving  -F/U blood cultures NGTD    *Anemia of chronic disease  *Positive FOBT  -s/p 1 unit PRBCs- 4/9/23  -consider restarting AC and monitor H/H for further drop in H/H  - GI not considering Endo/colonoscopy at this time, consider restarting AC and monitor H/H   -Continue to monitor  -Low iron and folate, Continue supplements  -Per family, patient has never had a colonoscopy, but has normally low H/H levels. Per PCP Dr. Merchant patient's hemoglobin normally around 9  -At this time family would like to pursue whatever measures are necessary to help her, understand risks of colonoscopy with older age as well as risks affiliated with blood transfusions   -GI recs--may plan for egd/cscope    *New A-fib  -Was started on Eliquis, but with worsening anemia, FOBT +, now holding eliquis (will also stop ASA for now, no hx of CAD or stroke, risk of bleeding on ASA + Eliquis > benefits)  -Cardio recs  -Echo: LVEF 55-60%, moderate to severe aortic stenosis, grade 3 diastolic dysfunction, severe pulm htn  -troponin negative   -d-dimer positive, would consider V/Q as pt had tachycardia and was recently on plane ride from [place], but EKG and echo not significant for RV strain, doppler LE negative for DVT, tachycardia resolved as asthma exacerbation improving    *Constipation  -Bowel regimen   - Having multiple BMs as per nursing    *Essential HTN  -c/w Norvasc, Clonidine    *Hypothyroidism  -c/w Synthroid  -TSH reviewed wnl     *Bilateral leg swelling  -improved  -US negative DVT (recent plane ride)  -Suspect from low albumen state  -does not appear to be in overt heart failure at this time despite elevated pro-BNP    *HLD  -c/w statin    *DM2  -hold oral meds (Metformin, Glimeperide)  -ISS  -Hgb A1c 7.2    *PREM vs CKD3  -Prior Cr 1.15 (2017) and 1.55 (2022)  -Unclear if we are dealing with PREM on CKD3, vs if this is the new baseline  -Hold Metformin  -Renal sono with no hydro    #DVT ppx: patient anemic, positive FOBT, holding anticoagulation     Dispo: pending GI for inpatient EGD.cscope. PT rec IVELISSE Ramirez is looking into [facility]    Case previously d/w patient's PMD, Dr. Merchant, [phone], who is involved in her care and would appreciate any updates  4/12 Updated daughter in law Castillo at     Code Status: DNR        89F (Gujarati-speaking) with HTN, asthma, DM2, hypothyroidism, recent hospitalization in [place] for "trouble breathing", returned from [place] 5 days ago, comes to the ED with SOB, diagnosed with asthma exacerbation secondary to RSV    Asthma exacerbation  RSV infection  -CT chest non-contrast shows trace bilateral pleural effusions   -Continue steroid taper  -Albuterol nebs q6h prn  -Added Symbicort   -d-dimer 506, doppler US B/L LE negative for DVT  -considered V/Q scan (Cr elevated) as pt had tachycardia and was recently on plane ride from [place], but EKG and echo not significant for RV strain, doppler LE negative for DVT, tachycardia resolved as asthma exacerbation improving  -F/U blood cultures NGTD    *Anemia of chronic disease  *Positive FOBT  -s/p 1 unit PRBCs- 4/9/23  -consider restarting AC and monitor H/H for further drop in H/H  - GI not considering Endo/colonoscopy at this time, consider restarting AC and monitor H/H   -Continue to monitor  -Low iron and folate, Continue supplements  -Per family, patient has never had a colonoscopy, but has normally low H/H levels. Per PCP Dr. Merchant patient's hemoglobin normally around 9  -At this time family would like to pursue whatever measures are necessary to help her, understand risks of colonoscopy with older age as well as risks affiliated with blood transfusions   -GI recs--may plan for egd/cscope    *New A-fib  -Was started on Eliquis, but with worsening anemia, FOBT +, now holding eliquis (will also stop ASA for now, no hx of CAD or stroke, risk of bleeding on ASA + Eliquis > benefits)  -Cardio recs  -Echo: LVEF 55-60%, moderate to severe aortic stenosis, grade 3 diastolic dysfunction, severe pulm htn  -troponin negative   -d-dimer positive, would consider V/Q as pt had tachycardia and was recently on plane ride from [place], but EKG and echo not significant for RV strain, doppler LE negative for DVT, tachycardia resolved as asthma exacerbation improving    *Constipation  -Bowel regimen   - Having multiple BMs as per nursing    *Essential HTN  -c/w Norvasc, Clonidine    *Hypothyroidism  -c/w Synthroid  -TSH reviewed wnl     *Bilateral leg swelling  -improved  -US negative DVT (recent plane ride)  -Suspect from low albumen state  -does not appear to be in overt heart failure at this time despite elevated pro-BNP    *HLD  -c/w statin    *DM2  -hold oral meds (Metformin, Glimeperide)  -ISS  -Hgb A1c 7.2    *PREM vs CKD3  -Prior Cr 1.15 (2017) and 1.55 (2022)  -Unclear if we are dealing with PREM on CKD3, vs if this is the new baseline  -Hold Metformin  -Renal sono with no hydro    #DVT ppx: patient anemic, positive FOBT, holding anticoagulation     Dispo: pending GI for inpatient EGD.cscope. PT rec IVELISSE Ramirez is looking into [facility]    Case previously d/w patient's PMD, Dr. Merchant, [phone], who is involved in her care and would appreciate any updates  4/12 Updated daughter in law Castillo at     Code Status: DNR        89F (Gujarati-speaking) with HTN, asthma, DM2, hypothyroidism, recent hospitalization in [place] for "trouble breathing", returned from [place] 5 days ago, comes to the ED with SOB, diagnosed with asthma exacerbation secondary to RSV    Asthma exacerbation  RSV infection  -CT chest non-contrast shows trace bilateral pleural effusions   -Continue steroid taper  -Albuterol nebs q6h prn  -Added Symbicort   -d-dimer 506, doppler US B/L LE negative for DVT  -considered V/Q scan (Cr elevated) as pt had tachycardia and was recently on plane ride from [place], but EKG and echo not significant for RV strain, doppler LE negative for DVT, tachycardia resolved as asthma exacerbation improving  -F/U blood cultures NGTD    *Anemia of chronic disease  *Positive FOBT  -s/p 1 unit PRBCs- 4/9/23  -consider restarting AC and monitor H/H for further drop in H/H  - GI not considering Endo/colonoscopy at this time, consider restarting AC and monitor H/H   -Continue to monitor  -Low iron and folate, Continue supplements  -Per family, patient has never had a colonoscopy, but has normally low H/H levels. Per PCP Dr. Merchant patient's hemoglobin normally around 9  -At this time family would like to pursue whatever measures are necessary to help her, understand risks of colonoscopy with older age as well as risks affiliated with blood transfusions   -GI recs--may plan for egd/cscope    *New A-fib  -Was started on Eliquis, but with worsening anemia, FOBT +, now holding eliquis (will also stop ASA for now, no hx of CAD or stroke, risk of bleeding on ASA + Eliquis > benefits)  -Cardio recs  -Echo: LVEF 55-60%, moderate to severe aortic stenosis, grade 3 diastolic dysfunction, severe pulm htn  -troponin negative   -d-dimer positive, ordered  V/Q as pt had tachycardia and was recently on plane ride from [place], but EKG and echo not significant for RV strain, doppler LE negative for DVT, tachycardia resolved as asthma exacerbation improving  started IV heparin drip for anticoagulation and will monitor H/H  if stable will change to Eliquis     *Constipation  -Bowel regimen   - Having multiple BMs as per nursing    *Essential HTN  -c/w Norvasc, Clonidine    *Hypothyroidism  -c/w Synthroid  -TSH reviewed wnl     *Bilateral leg swelling  -improved  -US negative DVT (recent plane ride)  -Suspect from low albumen state  -does not appear to be in overt heart failure at this time despite elevated pro-BNP    *HLD  -c/w statin    *DM2  -hold oral meds (Metformin, Glimeperide)  -ISS  -Hgb A1c 7.2    *PREM vs CKD3  -Prior Cr 1.15 (2017) and 1.55 (2022)  -Unclear if we are dealing with PREM on CKD3, vs if this is the new baseline  -Hold Metformin  -Renal sono with no hydro    #DVT ppx: patient anemic, positive FOBT, holding anticoagulation     Dispo: pending GI for inpatient EGD.cscope. PT rec IVELISSE Ramirez is looking into [facility]    Case previously d/w patient's PMD, Dr. Merchant, [phone], who is involved in her care and would appreciate any updates  4/12 Updated daughter in law Castillo at     Code Status: DNR        89F (Gujarati-speaking) with HTN, asthma, DM2, hypothyroidism, recent hospitalization in [place] for "trouble breathing", returned from [place] 5 days ago, comes to the ED with SOB, diagnosed with asthma exacerbation secondary to RSV    Asthma exacerbation  RSV infection  -CT chest non-contrast shows trace bilateral pleural effusions   -Continue steroid taper  -Albuterol nebs q6h prn  -Added Symbicort   -d-dimer 506, doppler US B/L LE negative for DVT  -considered V/Q scan (Cr elevated) as pt had tachycardia and was recently on plane ride from [place], but EKG and echo not significant for RV strain, doppler LE negative for DVT, tachycardia resolved as asthma exacerbation improving  -F/U blood cultures NGTD    *Anemia of chronic disease  *Positive FOBT  -s/p 1 unit PRBCs- 4/9/23  -consider restarting AC and monitor H/H for further drop in H/H  - GI not considering Endo/colonoscopy at this time, consider restarting AC and monitor H/H   -Continue to monitor  -Low iron and folate, Continue supplements  -Per family, patient has never had a colonoscopy, but has normally low H/H levels. Per PCP Dr. Merchant patient's hemoglobin normally around 9  -At this time family would like to pursue whatever measures are necessary to help her, understand risks of colonoscopy with older age as well as risks affiliated with blood transfusions   -GI recs--may plan for egd/cscope    *New A-fib  -Was started on Eliquis, but with worsening anemia, FOBT +, now holding eliquis (will also stop ASA for now, no hx of CAD or stroke, risk of bleeding on ASA + Eliquis > benefits)  -Cardio recs  -Echo: LVEF 55-60%, moderate to severe aortic stenosis, grade 3 diastolic dysfunction, severe pulm htn  -troponin negative   -d-dimer positive, ordered  V/Q as pt had tachycardia and was recently on plane ride from [place], but EKG and echo not significant for RV strain, doppler LE negative for DVT, tachycardia resolved as asthma exacerbation improving  started IV heparin drip for anticoagulation and will monitor H/H  if stable will change to Eliquis     *Constipation  -Bowel regimen   - Having multiple BMs as per nursing    *Essential HTN  -c/w Norvasc, Clonidine    *Hypothyroidism  -c/w Synthroid  -TSH reviewed wnl     *Bilateral leg swelling  -improved  -US negative DVT (recent plane ride)  -Suspect from low albumen state  -does not appear to be in overt heart failure at this time despite elevated pro-BNP    *HLD  -c/w statin    *DM2  -hold oral meds (Metformin, Glimeperide)  -ISS  -Hgb A1c 7.2    *PREM vs CKD3  -Prior Cr 1.15 (2017) and 1.55 (2022)  -Unclear if we are dealing with PREM on CKD3, vs if this is the new baseline  -Hold Metformin  -Renal sono with no hydro    #DVT ppx: patient anemic, positive FOBT, holding anticoagulation     Dispo: pending GI for inpatient EGD.cscope. PT rec IVELISSE Ramirez is looking into [facility]    Case previously d/w patient's PMD, Dr. Merchant, [phone], who is involved in her care and would appreciate any updates  4/12 Updated daughter in law Castillo at     Code Status: DNR        89F (Gujarati-speaking) with HTN, asthma, DM2, hypothyroidism, recent hospitalization in [place] for "trouble breathing", returned from [place] 5 days ago, comes to the ED with SOB, diagnosed with asthma exacerbation secondary to RSV    Asthma exacerbation  RSV infection  -CT chest non-contrast shows trace bilateral pleural effusions   -Continue steroid taper  -Albuterol nebs q6h prn  -Added Symbicort   -d-dimer 506, doppler US B/L LE negative for DVT  -considered V/Q scan (Cr elevated) as pt had tachycardia and was recently on plane ride from [place], but EKG and echo not significant for RV strain, doppler LE negative for DVT, tachycardia resolved as asthma exacerbation improving  -F/U blood cultures NGTD    *Anemia of chronic disease  *Positive FOBT  -s/p 1 unit PRBCs- 4/9/23  -consider restarting AC and monitor H/H for further drop in H/H  - GI not considering Endo/colonoscopy at this time, consider restarting AC and monitor H/H   -Continue to monitor  -Low iron and folate, Continue supplements  -Per family, patient has never had a colonoscopy, but has normally low H/H levels. Per PCP Dr. Merchant patient's hemoglobin normally around 9  -At this time family would like to pursue whatever measures are necessary to help her, understand risks of colonoscopy with older age as well as risks affiliated with blood transfusions   -GI recs--may plan for egd/cscope    *New A-fib  -Was started on Eliquis, but with worsening anemia, FOBT +, now holding eliquis (will also stop ASA for now, no hx of CAD or stroke, risk of bleeding on ASA + Eliquis > benefits)  -Cardio recs  -Echo: LVEF 55-60%, moderate to severe aortic stenosis, grade 3 diastolic dysfunction, severe pulm htn  -troponin negative   -d-dimer positive, ordered  V/Q as pt had tachycardia and was recently on plane ride from [place], but EKG and echo not significant for RV strain, doppler LE negative for DVT, tachycardia resolved as asthma exacerbation improving  started IV heparin drip for anticoagulation and will monitor H/H  if stable will change to Eliquis     *Constipation  -Bowel regimen   - Having multiple BMs as per nursing    *Essential HTN  -c/w Norvasc, Clonidine    *Hypothyroidism  -c/w Synthroid  -TSH reviewed wnl     *Bilateral leg swelling  -improved  -US negative DVT (recent plane ride)  -Suspect from low albumen state  -does not appear to be in overt heart failure at this time despite elevated pro-BNP    *HLD  -c/w statin    *DM2  -hold oral meds (Metformin, Glimeperide)  -ISS  -Hgb A1c 7.2    *PREM vs CKD3  -Prior Cr 1.15 (2017) and 1.55 (2022)  -Unclear if we are dealing with PREM on CKD3, vs if this is the new baseline  -Hold Metformin  -Renal sono with no hydro    #DVT ppx: patient anemic, positive FOBT, holding anticoagulation     Dispo: pending GI for inpatient EGD.cscope. PT rec IVELISSE Ramirez is looking into [facility]    Case previously d/w patient's PMD, Dr. Merchant, [phone], who is involved in her care and would appreciate any updates  4/12 Updated daughter in law Castillo at     Code Status: DNR

## 2023-04-14 LAB
ANION GAP SERPL CALC-SCNC: 6 MMOL/L — SIGNIFICANT CHANGE UP (ref 5–17)
APTT BLD: 66.9 SEC — HIGH (ref 27.5–35.5)
APTT BLD: 69.1 SEC — HIGH (ref 27.5–35.5)
BUN SERPL-MCNC: 61 MG/DL — HIGH (ref 7–23)
CALCIUM SERPL-MCNC: 8.5 MG/DL — SIGNIFICANT CHANGE UP (ref 8.4–10.5)
CHLORIDE SERPL-SCNC: 96 MMOL/L — SIGNIFICANT CHANGE UP (ref 96–108)
CO2 SERPL-SCNC: 32 MMOL/L — HIGH (ref 22–31)
CREAT SERPL-MCNC: 1.77 MG/DL — HIGH (ref 0.5–1.3)
EGFR: 27 ML/MIN/1.73M2 — LOW
GLUCOSE BLDC GLUCOMTR-MCNC: 143 MG/DL — HIGH (ref 70–99)
GLUCOSE BLDC GLUCOMTR-MCNC: 158 MG/DL — HIGH (ref 70–99)
GLUCOSE BLDC GLUCOMTR-MCNC: 177 MG/DL — HIGH (ref 70–99)
GLUCOSE BLDC GLUCOMTR-MCNC: 343 MG/DL — HIGH (ref 70–99)
GLUCOSE SERPL-MCNC: 142 MG/DL — HIGH (ref 70–99)
HCT VFR BLD CALC: 27.2 % — LOW (ref 34.5–45)
HGB BLD-MCNC: 8.9 G/DL — LOW (ref 11.5–15.5)
INR BLD: 0.95 RATIO — SIGNIFICANT CHANGE UP (ref 0.88–1.16)
MCHC RBC-ENTMCNC: 21.5 PG — LOW (ref 27–34)
MCHC RBC-ENTMCNC: 32.7 GM/DL — SIGNIFICANT CHANGE UP (ref 32–36)
MCV RBC AUTO: 65.7 FL — LOW (ref 80–100)
NRBC # BLD: 0 /100 WBCS — SIGNIFICANT CHANGE UP (ref 0–0)
PLATELET # BLD AUTO: 247 K/UL — SIGNIFICANT CHANGE UP (ref 150–400)
POTASSIUM SERPL-MCNC: 4.8 MMOL/L — SIGNIFICANT CHANGE UP (ref 3.5–5.3)
POTASSIUM SERPL-SCNC: 4.8 MMOL/L — SIGNIFICANT CHANGE UP (ref 3.5–5.3)
PROTHROM AB SERPL-ACNC: 11 SEC — SIGNIFICANT CHANGE UP (ref 10.5–13.4)
RBC # BLD: 4.14 M/UL — SIGNIFICANT CHANGE UP (ref 3.8–5.2)
RBC # FLD: 19.2 % — HIGH (ref 10.3–14.5)
SARS-COV-2 RNA SPEC QL NAA+PROBE: SIGNIFICANT CHANGE UP
SODIUM SERPL-SCNC: 134 MMOL/L — LOW (ref 135–145)
WBC # BLD: 9.82 K/UL — SIGNIFICANT CHANGE UP (ref 3.8–10.5)
WBC # FLD AUTO: 9.82 K/UL — SIGNIFICANT CHANGE UP (ref 3.8–10.5)

## 2023-04-14 PROCEDURE — 99233 SBSQ HOSP IP/OBS HIGH 50: CPT

## 2023-04-14 PROCEDURE — 78580 LUNG PERFUSION IMAGING: CPT | Mod: 26

## 2023-04-14 PROCEDURE — 93010 ELECTROCARDIOGRAM REPORT: CPT

## 2023-04-14 RX ORDER — APIXABAN 2.5 MG/1
2.5 TABLET, FILM COATED ORAL
Refills: 0 | Status: DISCONTINUED | OUTPATIENT
Start: 2023-04-14 | End: 2023-04-15

## 2023-04-14 RX ADMIN — PANTOPRAZOLE SODIUM 40 MILLIGRAM(S): 20 TABLET, DELAYED RELEASE ORAL at 11:49

## 2023-04-14 RX ADMIN — Medication 8: at 11:52

## 2023-04-14 RX ADMIN — HEPARIN SODIUM 650 UNIT(S)/HR: 5000 INJECTION INTRAVENOUS; SUBCUTANEOUS at 07:22

## 2023-04-14 RX ADMIN — Medication 0.2 MILLIGRAM(S): at 17:26

## 2023-04-14 RX ADMIN — Medication 1 MILLIGRAM(S): at 11:49

## 2023-04-14 RX ADMIN — Medication 0.2 MILLIGRAM(S): at 05:48

## 2023-04-14 RX ADMIN — INSULIN GLARGINE 5 UNIT(S): 100 INJECTION, SOLUTION SUBCUTANEOUS at 21:34

## 2023-04-14 RX ADMIN — SENNA PLUS 2 TABLET(S): 8.6 TABLET ORAL at 21:19

## 2023-04-14 RX ADMIN — BUDESONIDE AND FORMOTEROL FUMARATE DIHYDRATE 2 PUFF(S): 160; 4.5 AEROSOL RESPIRATORY (INHALATION) at 08:45

## 2023-04-14 RX ADMIN — Medication 325 MILLIGRAM(S): at 11:49

## 2023-04-14 RX ADMIN — AMLODIPINE BESYLATE 5 MILLIGRAM(S): 2.5 TABLET ORAL at 05:48

## 2023-04-14 RX ADMIN — BUDESONIDE AND FORMOTEROL FUMARATE DIHYDRATE 2 PUFF(S): 160; 4.5 AEROSOL RESPIRATORY (INHALATION) at 20:32

## 2023-04-14 RX ADMIN — Medication 2: at 17:26

## 2023-04-14 RX ADMIN — APIXABAN 2.5 MILLIGRAM(S): 2.5 TABLET, FILM COATED ORAL at 17:26

## 2023-04-14 RX ADMIN — Medication 25 MICROGRAM(S): at 05:49

## 2023-04-14 RX ADMIN — ATORVASTATIN CALCIUM 20 MILLIGRAM(S): 80 TABLET, FILM COATED ORAL at 21:19

## 2023-04-14 NOTE — PROGRESS NOTE ADULT - ASSESSMENT
89F with PMH HTN, asthma, Type 2 DM, hypothyroidism, recent hospitalization in [place] for "trouble breathing", returned from [place] 5 days prior to admission, comes to the ED with SOB. Admitted for asthma exacerbation secondary to RSV.    #Asthma exacerbation  #RSV infection  -CT chest non-contrast shows trace bilateral pleural effusions   -Continue steroid taper  -Continue Albuterol PRN, Symbicort    #Acute on chronic blood loss anemia   -s/p 1 unit pRBC with appropriate increase, per PCP hemoglobin ~ 9.0   -Drop in H/H likely from AC  -GI consulted, recommendations appreciated. Plan for outpatient EGD/colonoscopy  -Continue heparin gtt, plan to switch to Eliquis today   -Monitor H/H  -Low iron and folate, Continue supplements    #New onset A-fib  -Was started on Eliquis, but with worsening anemia, FOBT +, now holding Eliquis   -Continue heparin gtt for AC - plan to transition to Eliquis if H/H remains stable  -ASA stopped for now, no hx of CAD or stroke, risk of bleeding on ASA + Eliquis > benefits  -Echo: LVEF 55-60%, moderate to severe aortic stenosis, grade 3 diastolic dysfunction, severe pulm htn  -troponin negative   -Cardio consulted, recommendations appreciated  -d-dimer positive, ordered V/Q as pt had tachycardia and was recently on plane ride from [place], but EKG and echo not significant for RV strain, doppler LE negative for DVT, tachycardia resolved as asthma exacerbation improving    #PREM on CKD Stage 4  Per chart review Cr baseline ~1.8  Cr 1.64 on 4/7/23  -Renal sono with no hydro  -Gentle PO hydration encouraged  -Avoid nephrotoxic medications  -Follow up AM BMP    #Essential HTN  -Continue Norvasc, Clonidine  -Monitor vitals    #Hypothyroidism  -Continue Synthroid  -TSH reviewed wnl    #HLD  -Continue Lipitor    #DM Type 2  A1C 7.2  -hold oral meds (Metformin, Glimepiride)  -Continue Lantus 5 units qHS, moderate dose insulin sliding scale  -Hypoglycemia protocol, accu-checks  -Blood glucose goal 100-180 in hospital setting    #Prophylactic Measure  -DVT ppx: Heparin gtt  -GI ppx: Protonix  -Bowel regimen - constipation resolving   -PT eval - [facility]. Family interested in [facility]      PMD, Dr. Merchant, [phone], involved in her care and would appreciate any updates    Daughter in law Anna     Code Status: DNR  89F with PMH HTN, asthma, Type 2 DM, hypothyroidism, recent hospitalization in [place] for "trouble breathing", returned from [place] 5 days prior to admission, comes to the ED with SOB. Admitted for asthma exacerbation secondary to RSV.    #Asthma exacerbation  #RSV infection  -CT chest non-contrast shows trace bilateral pleural effusions   -Continue steroid taper  -Continue Albuterol PRN, Symbicort    #Acute on chronic blood loss anemia   -s/p 1 unit pRBC with appropriate increase, per PCP hemoglobin ~ 9.0   -Drop in H/H likely from AC  -GI consulted, recommendations appreciated. Plan for outpatient EGD/colonoscopy  -Continue heparin gtt, plan to switch to Eliquis today   -Monitor H/H  -Low iron and folate, Continue supplements    #New onset A-fib  -Was started on Eliquis, but with worsening anemia, FOBT +, now holding Eliquis   -Continue heparin gtt for AC - plan to transition to Eliquis if H/H remains stable  -ASA stopped for now, no hx of CAD or stroke, risk of bleeding on ASA + Eliquis > benefits  -Echo: LVEF 55-60%, moderate to severe aortic stenosis, grade 3 diastolic dysfunction, severe pulm htn  -troponin negative   -Cardio consulted, recommendations appreciated  -d-dimer positive, ordered V/Q as pt had tachycardia and was recently on plane ride from [place], but EKG and echo not significant for RV strain, doppler LE negative for DVT, tachycardia resolved as asthma exacerbation improving    #PREM on CKD Stage 4  Per chart review Cr baseline ~1.8  Cr 1.64 on 4/7/23  -Renal sono with no hydro  -Gentle PO hydration encouraged  -Avoid nephrotoxic medications  -Follow up AM BMP    #Essential HTN  -Continue Norvasc, Clonidine  -Monitor vitals    #Hypothyroidism  -Continue Synthroid  -TSH reviewed wnl    #HLD  -Continue Lipitor    #DM Type 2  A1C 7.2  -hold oral meds (Metformin, Glimepiride)  -Continue Lantus 5 units qHS, moderate dose insulin sliding scale  -Hypoglycemia protocol, accu-checks  -Blood glucose goal 100-180 in hospital setting    #Prophylactic Measure  -DVT ppx: Heparin gtt  -GI ppx: Protonix  -Bowel regimen - constipation resolving   -PT eval - [facility]. Family interested in [facility]      PMD, Dr. Merchant, [phone], involved in her care and would appreciate any updates    Daughter in law Anna     Code Status: DNR  89F with PMH HTN, asthma, Type 2 DM, hypothyroidism, recent hospitalization in [place] for "trouble breathing", returned from [place] 5 days prior to admission, comes to the ED with SOB. Admitted for asthma exacerbation secondary to RSV.    #Asthma exacerbation  #RSV infection  -CT chest non-contrast shows trace bilateral pleural effusions   -Continue steroid taper  -Continue Albuterol PRN, Symbicort    #Acute on chronic blood loss anemia   -s/p 1 unit pRBC with appropriate increase, per PCP hemoglobin ~ 9.0   -Drop in H/H likely from AC  -GI consulted, recommendations appreciated. Plan for outpatient EGD/colonoscopy  -Continue heparin gtt, plan to switch to Eliquis today   -Monitor H/H  -Low iron and folate, Continue supplements    #New onset A-fib  -Was started on Eliquis, but with worsening anemia, FOBT +, now holding Eliquis   -Continue heparin gtt for AC - plan to transition to Eliquis if H/H remains stable  -ASA stopped for now, no hx of CAD or stroke, risk of bleeding on ASA + Eliquis > benefits  -Echo: LVEF 55-60%, moderate to severe aortic stenosis, grade 3 diastolic dysfunction, severe pulm htn  -troponin negative   -Cardio consulted, recommendations appreciated  -d-dimer positive, ordered V/Q as pt had tachycardia and was recently on plane ride from [place], but EKG and echo not significant for RV strain, doppler LE negative for DVT, tachycardia resolved as asthma exacerbation improving    #PREM on CKD Stage 4  Per chart review Cr baseline ~1.8  Cr 1.64 on 4/7/23  -Renal sono with no hydro  -Gentle PO hydration encouraged  -Avoid nephrotoxic medications  -Follow up AM BMP    #Essential HTN  -Continue Norvasc, Clonidine  -Monitor vitals    #Hypothyroidism  -Continue Synthroid  -TSH reviewed wnl    #HLD  -Continue Lipitor    #DM Type 2  A1C 7.2  -hold oral meds (Metformin, Glimepiride)  -Continue Lantus 5 units qHS, moderate dose insulin sliding scale  -Hypoglycemia protocol, accu-checks  -Blood glucose goal 100-180 in hospital setting    #Prophylactic Measure  -DVT ppx: Heparin gtt  -GI ppx: Protonix  -Bowel regimen - constipation resolving   -PT eval - [facility]. Family interested in [facility]      PMD, Dr. Merchant, [phone], involved in her care and would appreciate any updates    Daughter in law Anna     Code Status: DNR  89F with PMH HTN, asthma, Type 2 DM, hypothyroidism, recent hospitalization in [place] for "trouble breathing", returned from [place] 5 days prior to admission, comes to the ED with SOB. Admitted for asthma exacerbation secondary to RSV.    #Asthma exacerbation  #RSV infection  -CT chest non-contrast shows trace bilateral pleural effusions   -Continue steroid taper  -Continue Albuterol PRN, Symbicort    #Acute on chronic blood loss anemia   -s/p 1 unit pRBC with appropriate increase, per PCP hemoglobin ~ 9.0   -Drop in H/H likely from AC  -GI consulted, recommendations appreciated. Plan for outpatient EGD/colonoscopy  -Continue heparin gtt, plan to switch to Eliquis today   -Monitor H/H  -Low iron and folate, Continue supplements    #New onset A-fib  -Was started on Eliquis, but with worsening anemia, FOBT +, now holding Eliquis   -Continue heparin gtt for AC - plan to transition to Eliquis if H/H remains stable  -ASA stopped for now, no hx of CAD or stroke, risk of bleeding on ASA + Eliquis > benefits  -Echo: LVEF 55-60%, moderate to severe aortic stenosis, grade 3 diastolic dysfunction, severe pulm htn  -troponin negative   -Cardio consulted, recommendations appreciated  -d-dimer positive, ordered V/Q as pt had tachycardia and was recently on plane ride from [place], but EKG and echo not significant for RV strain, doppler LE negative for DVT, tachycardia resolved as asthma exacerbation improving - low probability for PE    #PREM on CKD Stage 4  Per chart review Cr baseline ~1.8  Cr 1.64 on 4/7/23  -Renal sono with no hydro  -Gentle PO hydration encouraged  -Avoid nephrotoxic medications  -Follow up AM BMP    #Essential HTN  -Continue Norvasc, Clonidine  -Monitor vitals    #Hypothyroidism  -Continue Synthroid  -TSH reviewed wnl    #HLD  -Continue Lipitor    #DM Type 2  A1C 7.2  -hold oral meds (Metformin, Glimepiride)  -Continue Lantus 5 units qHS, moderate dose insulin sliding scale  -Hypoglycemia protocol, accu-checks  -Blood glucose goal 100-180 in hospital setting    #Prophylactic Measure  -DVT ppx: Heparin gtt  -GI ppx: Protonix  -Bowel regimen - constipation resolving   -PT eval - [facility]. Family interested in [facility]      PMD, Dr. Merchant, [phone], involved in her care and would appreciate any updates    Left message for daughter in law Anna  awaiting call back    Code Status: DNR     Dispo: Eliquis started today, if H/H stable plan to DC to [facility]. 89F with PMH HTN, asthma, Type 2 DM, hypothyroidism, recent hospitalization in [place] for "trouble breathing", returned from [place] 5 days prior to admission, comes to the ED with SOB. Admitted for asthma exacerbation secondary to RSV.    #Asthma exacerbation  #RSV infection  -CT chest non-contrast shows trace bilateral pleural effusions   -Continue steroid taper  -Continue Albuterol PRN, Symbicort    #Acute on chronic blood loss anemia   -s/p 1 unit pRBC with appropriate increase, per PCP hemoglobin ~ 9.0   -Drop in H/H likely from AC  -GI consulted, recommendations appreciated. Plan for outpatient EGD/colonoscopy  -Continue heparin gtt, plan to switch to Eliquis today   -Monitor H/H  -Low iron and folate, Continue supplements    #New onset A-fib  -Was started on Eliquis, but with worsening anemia, FOBT +, now holding Eliquis   -Continue heparin gtt for AC - plan to transition to Eliquis if H/H remains stable  -ASA stopped for now, no hx of CAD or stroke, risk of bleeding on ASA + Eliquis > benefits  -Echo: LVEF 55-60%, moderate to severe aortic stenosis, grade 3 diastolic dysfunction, severe pulm htn  -troponin negative   -Cardio consulted, recommendations appreciated  -d-dimer positive, ordered V/Q as pt had tachycardia and was recently on plane ride from [place], but EKG and echo not significant for RV strain, doppler LE negative for DVT, tachycardia resolved as asthma exacerbation improving - low probability for PE    #PREM on CKD Stage 4  Per chart review Cr baseline ~1.8  Cr 1.64 on 4/7/23  -Renal sono with no hydro  -Gentle PO hydration encouraged  -Avoid nephrotoxic medications  -Follow up AM BMP    #Essential HTN  -Continue Norvasc, Clonidine  -Monitor vitals    #Hypothyroidism  -Continue Synthroid  -TSH reviewed wnl    #HLD  -Continue Lipitor    #DM Type 2  A1C 7.2  -hold oral meds (Metformin, Glimepiride)  -Continue Lantus 5 units qHS, moderate dose insulin sliding scale  -Hypoglycemia protocol, accu-checks  -Blood glucose goal 100-180 in hospital setting    #Prophylactic Measure  -DVT ppx: Heparin gtt  -GI ppx: Protonix  -Bowel regimen - constipation resolving   -PT eval - [facility]. Family interested in [facility]      PMD, Dr. Merchant, [phone], involved in her care and would appreciate any updates    Left message for daughter in law Anna  awaiting call back    Code Status: DNR     Dispo: Eliquis started today, if H/H stable plan to DC to [facility]. 89F with PMH HTN, asthma, Type 2 DM, hypothyroidism, recent hospitalization in [place] for "trouble breathing", returned from [place] 5 days prior to admission, comes to the ED with SOB. Admitted for asthma exacerbation secondary to RSV.    #Asthma exacerbation  #RSV infection  -CT chest non-contrast shows trace bilateral pleural effusions   -Continue steroid taper  -Continue Albuterol PRN, Symbicort    #Acute on chronic blood loss anemia   -s/p 1 unit pRBC with appropriate increase, per PCP hemoglobin ~ 9.0   -Drop in H/H likely from AC  -GI consulted, recommendations appreciated. Plan for outpatient EGD/colonoscopy  -Continue heparin gtt, plan to switch to Eliquis today   -Monitor H/H  -Low iron and folate, Continue supplements    #New onset A-fib  -Was started on Eliquis, but with worsening anemia, FOBT +, now holding Eliquis   -Continue heparin gtt for AC - plan to transition to Eliquis if H/H remains stable  -ASA stopped for now, no hx of CAD or stroke, risk of bleeding on ASA + Eliquis > benefits  -Echo: LVEF 55-60%, moderate to severe aortic stenosis, grade 3 diastolic dysfunction, severe pulm htn  -troponin negative   -Cardio consulted, recommendations appreciated  -d-dimer positive, ordered V/Q as pt had tachycardia and was recently on plane ride from [place], but EKG and echo not significant for RV strain, doppler LE negative for DVT, tachycardia resolved as asthma exacerbation improving - low probability for PE    #PREM on CKD Stage 4  Per chart review Cr baseline ~1.8  Cr 1.64 on 4/7/23  -Renal sono with no hydro  -Gentle PO hydration encouraged  -Avoid nephrotoxic medications  -Follow up AM BMP    #Essential HTN  -Continue Norvasc, Clonidine  -Monitor vitals    #Hypothyroidism  -Continue Synthroid  -TSH reviewed wnl    #HLD  -Continue Lipitor    #DM Type 2  A1C 7.2  -hold oral meds (Metformin, Glimepiride)  -Continue Lantus 5 units qHS, moderate dose insulin sliding scale  -Hypoglycemia protocol, accu-checks  -Blood glucose goal 100-180 in hospital setting    #Prophylactic Measure  -DVT ppx: Heparin gtt  -GI ppx: Protonix  -Bowel regimen - constipation resolving   -PT eval - [facility]. Family interested in [facility]      PMD, Dr. Merchant, [phone], involved in her care and would appreciate any updates    Left message for daughter in law Anna  awaiting call back    Code Status: DNR     Dispo: Eliquis started today, if H/H stable plan to DC to [facility].

## 2023-04-14 NOTE — PROGRESS NOTE ADULT - SUBJECTIVE AND OBJECTIVE BOX
Patient is a 89y old  Female who presents with a chief complaint of Shortness of Breath (14 Apr 2023 11:57)      INTERVAL HPI/OVERNIGHT EVENTS: Patient seen and examined at bedside. No overnight events.    MEDICATIONS  (STANDING):  amLODIPine   Tablet 5 milliGRAM(s) Oral daily  atorvastatin 20 milliGRAM(s) Oral at bedtime  budesonide  80 MICROgram(s)/formoterol 4.5 MICROgram(s) Inhaler 2 Puff(s) Inhalation two times a day  cloNIDine 0.2 milliGRAM(s) Oral two times a day  dextrose 5%. 1000 milliLiter(s) (100 mL/Hr) IV Continuous <Continuous>  dextrose 5%. 1000 milliLiter(s) (50 mL/Hr) IV Continuous <Continuous>  dextrose 50% Injectable 25 Gram(s) IV Push once  dextrose 50% Injectable 12.5 Gram(s) IV Push once  dextrose 50% Injectable 25 Gram(s) IV Push once  ferrous    sulfate 325 milliGRAM(s) Oral daily  folic acid 1 milliGRAM(s) Oral daily  glucagon  Injectable 1 milliGRAM(s) IntraMuscular once  heparin  Infusion.  Unit(s)/Hr (6.5 mL/Hr) IV Continuous <Continuous>  insulin glargine Injectable (LANTUS) 5 Unit(s) SubCutaneous at bedtime  insulin lispro (ADMELOG) corrective regimen sliding scale   SubCutaneous three times a day before meals  insulin lispro (ADMELOG) corrective regimen sliding scale   SubCutaneous at bedtime  levothyroxine 25 MICROGram(s) Oral daily  pantoprazole   Suspension 40 milliGRAM(s) Oral daily  senna 2 Tablet(s) Oral at bedtime    MEDICATIONS  (PRN):  acetaminophen     Tablet .. 650 milliGRAM(s) Oral every 6 hours PRN Temp greater or equal to 38C (100.4F), Mild Pain (1 - 3)  albuterol    0.083% 2.5 milliGRAM(s) Nebulizer every 6 hours PRN Shortness of Breath and/or Wheezing  dextrose Oral Gel 15 Gram(s) Oral once PRN Blood Glucose LESS THAN 70 milliGRAM(s)/deciliter  heparin   Injectable 3200 Unit(s) IV Push every 6 hours PRN For aPTT less than 40  melatonin 3 milliGRAM(s) Oral at bedtime PRN Insomnia  ondansetron Injectable 4 milliGRAM(s) IV Push every 8 hours PRN Nausea and/or Vomiting  polyethylene glycol 3350 17 Gram(s) Oral daily PRN Constipation      Allergies    No Known Allergies    Intolerances        REVIEW OF SYSTEMS:  CONSTITUTIONAL: No fever or chills  CARDIOVASCULAR: No chest pain, palpitations    Vital Signs Last 24 Hrs  T(C): 36.3 (14 Apr 2023 06:07), Max: 36.3 (13 Apr 2023 16:42)  T(F): 97.3 (14 Apr 2023 06:07), Max: 97.3 (13 Apr 2023 16:42)  HR: 100 (14 Apr 2023 06:07) (78 - 100)  BP: 167/96 (14 Apr 2023 06:07) (144/61 - 167/96)  BP(mean): --  RR: 16 (14 Apr 2023 06:07) (16 - 17)  SpO2: 98% (14 Apr 2023 10:38) (94% - 100%)    Parameters below as of 14 Apr 2023 06:07  Patient On (Oxygen Delivery Method): room air      I&O's Summary    13 Apr 2023 07:01  -  14 Apr 2023 07:00  --------------------------------------------------------  IN: 84.5 mL / OUT: 1275 mL / NET: -1190.5 mL          PHYSICAL EXAM:  GENERAL: NAD, frail elderly female  HEENT:  AT/NC, anicteric, moist mucous membranes, EOMI, PERRL, no lid-lag, conjunctiva and sclera clear  CHEST/LUNG:  CTA b/l, no rales, wheezes, or rhonchi,  normal respiratory effort, no intercostal retractions  HEART:  IRRR, tachycardia S1, S2, 2/6 systolic murmurs; no pitting edema  ABDOMEN:  BS+, soft, nontender, nondistended  MSK/EXTREMITIES: palpable peripheral pulses, no clubbing or cyanosis  NERVOUS SYSTEM: Alert, follows simple commands, grossly moves all extremities    LABS: Personally reviewed                        8.9    9.82  )-----------( 247      ( 14 Apr 2023 05:00 )             27.2     04-14    134  |  96  |  61  ----------------------------<  142  4.8   |  32  |  1.77    Ca    8.5      14 Apr 2023 05:00            PT/INR - ( 14 Apr 2023 05:00 )   PT: 11.0 sec;   INR: 0.95 ratio         PTT - ( 14 Apr 2023 06:05 )  PTT:69.1 sec                        POCT Blood Glucose.: 143 mg/dL (14 Apr 2023 07:45)  POCT Blood Glucose.: 154 mg/dL (13 Apr 2023 21:16)  POCT Blood Glucose.: 201 mg/dL (13 Apr 2023 17:12)  POCT Blood Glucose.: 279 mg/dL (13 Apr 2023 12:19)          Culture - Urine (collected 07 Apr 2023 18:55)  Source: Clean Catch Clean Catch (Midstream)  Final Report (09 Apr 2023 11:03):    >=3 organisms. Probable collection contamination.    Culture - Blood (collected 07 Apr 2023 15:30)  Source: .Blood Blood-Peripheral  Final Report (12 Apr 2023 22:00):    No Growth Final    Culture - Blood (collected 07 Apr 2023 15:30)  Source: .Blood Blood-Peripheral  Final Report (12 Apr 2023 22:00):    No Growth Final      COVID-19 PCR: NotDetec (04-12-23 @ 23:12)        Culture - Urine (collected 04-07-23 @ 18:55)  Source: Clean Catch Clean Catch (Midstream)  Final Report (04-09-23 @ 11:03):    >=3 organisms. Probable collection contamination.    Culture - Blood (collected 04-07-23 @ 15:30)  Source: .Blood Blood-Peripheral  Final Report (04-12-23 @ 22:00):    No Growth Final    Culture - Blood (collected 04-07-23 @ 15:30)  Source: .Blood Blood-Peripheral  Final Report (04-12-23 @ 22:00):    No Growth Final        RADIOLOGY & ADDITIONAL TESTS: Personally reviewed.   < from: 12 Lead ECG (04.14.23 @ 07:59) >  Ventricular Rate 99 BPM    Atrial Rate 115 BPM    QRS Duration 80 ms    Q-T Interval 354 ms    QTC Calculation(Bazett) 454 ms    R Axis 51 degrees    T Axis -38 degrees    Diagnosis Line Atrial fibrillation  Nonspecific T wave abnormality  AbnormalECG  When compared with ECG of 07-APR-2023 15:10,  No significant change was found    Confirmed by NATHAN RUBY MD ([id number]) on 4/14/2023 9:06:47 AM    < end of copied text >  personally reviewed EKG a fib 115    < from: NM Pulmonary Perfusion Scan (04.14.23 @ 10:18) >    ACC: [id number] EXAM:  NM PULM PERFUSION IMG   ORDERED BY: KEN LEGGETT     PROCEDURE DATE:  04/14/2023          INTERPRETATION:  RADIOPHARMACEUTICAL:  3.6 mCi Tc-99m-MAA, I.V.    CLINICAL INFORMATION: 89 year old female with tachycardia; referred to   evaluate for pulmonary embolism.    TECHNIQUE:  Perfusion images of the lungs were obtained following   administration of Tc-99m-MAA. Images were obtained in the anterior,   posterior, both lateral, and all 4 oblique projections. The study was   interpreted in conjunction with a chest radiograph of 4/13/2020.    COMPARISON: No previous lung scans were available for comparison.    FINDINGS: There is heterogeneous distribution of radiopharmaceutical in   both lungs on the perfusion images.There are no segmental perfusion   defects in either lung.    IMPRESSION: Very low probability of pulmonary embolus.    --- End of Report ---            SYLVIE RAMIREZ MD; Attending Nuclear Medicine  This document has been electronically signed. Apr 14 2023 10:24AM    < end of copied text >    Consultant(s) Notes Reviewed:  [x] YES  [ ] NO   Discussed with TRESA/SINDHU, RN     Patient is a 89y old  Female who presents with a chief complaint of Shortness of Breath (14 Apr 2023 11:57)      INTERVAL HPI/OVERNIGHT EVENTS: Patient seen and examined at bedside. No overnight events.    MEDICATIONS  (STANDING):  amLODIPine   Tablet 5 milliGRAM(s) Oral daily  atorvastatin 20 milliGRAM(s) Oral at bedtime  budesonide  80 MICROgram(s)/formoterol 4.5 MICROgram(s) Inhaler 2 Puff(s) Inhalation two times a day  cloNIDine 0.2 milliGRAM(s) Oral two times a day  dextrose 5%. 1000 milliLiter(s) (100 mL/Hr) IV Continuous <Continuous>  dextrose 5%. 1000 milliLiter(s) (50 mL/Hr) IV Continuous <Continuous>  dextrose 50% Injectable 25 Gram(s) IV Push once  dextrose 50% Injectable 12.5 Gram(s) IV Push once  dextrose 50% Injectable 25 Gram(s) IV Push once  ferrous    sulfate 325 milliGRAM(s) Oral daily  folic acid 1 milliGRAM(s) Oral daily  glucagon  Injectable 1 milliGRAM(s) IntraMuscular once  heparin  Infusion.  Unit(s)/Hr (6.5 mL/Hr) IV Continuous <Continuous>  insulin glargine Injectable (LANTUS) 5 Unit(s) SubCutaneous at bedtime  insulin lispro (ADMELOG) corrective regimen sliding scale   SubCutaneous three times a day before meals  insulin lispro (ADMELOG) corrective regimen sliding scale   SubCutaneous at bedtime  levothyroxine 25 MICROGram(s) Oral daily  pantoprazole   Suspension 40 milliGRAM(s) Oral daily  senna 2 Tablet(s) Oral at bedtime    MEDICATIONS  (PRN):  acetaminophen     Tablet .. 650 milliGRAM(s) Oral every 6 hours PRN Temp greater or equal to 38C (100.4F), Mild Pain (1 - 3)  albuterol    0.083% 2.5 milliGRAM(s) Nebulizer every 6 hours PRN Shortness of Breath and/or Wheezing  dextrose Oral Gel 15 Gram(s) Oral once PRN Blood Glucose LESS THAN 70 milliGRAM(s)/deciliter  heparin   Injectable 3200 Unit(s) IV Push every 6 hours PRN For aPTT less than 40  melatonin 3 milliGRAM(s) Oral at bedtime PRN Insomnia  ondansetron Injectable 4 milliGRAM(s) IV Push every 8 hours PRN Nausea and/or Vomiting  polyethylene glycol 3350 17 Gram(s) Oral daily PRN Constipation      Allergies    No Known Allergies    Intolerances        REVIEW OF SYSTEMS:  CONSTITUTIONAL: No fever or chills  CARDIOVASCULAR: No chest pain, palpitations    Vital Signs Last 24 Hrs  T(C): 36.3 (14 Apr 2023 06:07), Max: 36.3 (13 Apr 2023 16:42)  T(F): 97.3 (14 Apr 2023 06:07), Max: 97.3 (13 Apr 2023 16:42)  HR: 100 (14 Apr 2023 06:07) (78 - 100)  BP: 167/96 (14 Apr 2023 06:07) (144/61 - 167/96)  BP(mean): --  RR: 16 (14 Apr 2023 06:07) (16 - 17)  SpO2: 98% (14 Apr 2023 10:38) (94% - 100%)    Parameters below as of 14 Apr 2023 06:07  Patient On (Oxygen Delivery Method): room air      I&O's Summary    13 Apr 2023 07:01  -  14 Apr 2023 07:00  --------------------------------------------------------  IN: 84.5 mL / OUT: 1275 mL / NET: -1190.5 mL          PHYSICAL EXAM:  GENERAL: NAD, frail elderly female  HEENT:  AT/NC, anicteric, moist mucous membranes, EOMI, PERRL, no lid-lag, conjunctiva and sclera clear  CHEST/LUNG:  CTA b/l, no rales, wheezes, or rhonchi,  normal respiratory effort, no intercostal retractions  HEART:  IRRR, tachycardia S1, S2, 2/6 systolic murmurs; no pitting edema  ABDOMEN:  BS+, soft, nontender, nondistended  MSK/EXTREMITIES: palpable peripheral pulses, no clubbing or cyanosis  NERVOUS SYSTEM: Alert, follows simple commands, grossly moves all extremities    LABS: Personally reviewed                        8.9    9.82  )-----------( 247      ( 14 Apr 2023 05:00 )             27.2     04-14    134  |  96  |  61  ----------------------------<  142  4.8   |  32  |  1.77    Ca    8.5      14 Apr 2023 05:00            PT/INR - ( 14 Apr 2023 05:00 )   PT: 11.0 sec;   INR: 0.95 ratio         PTT - ( 14 Apr 2023 06:05 )  PTT:69.1 sec                        POCT Blood Glucose.: 143 mg/dL (14 Apr 2023 07:45)  POCT Blood Glucose.: 154 mg/dL (13 Apr 2023 21:16)  POCT Blood Glucose.: 201 mg/dL (13 Apr 2023 17:12)  POCT Blood Glucose.: 279 mg/dL (13 Apr 2023 12:19)          Culture - Urine (collected 07 Apr 2023 18:55)  Source: Clean Catch Clean Catch (Midstream)  Final Report (09 Apr 2023 11:03):    >=3 organisms. Probable collection contamination.    Culture - Blood (collected 07 Apr 2023 15:30)  Source: .Blood Blood-Peripheral  Final Report (12 Apr 2023 22:00):    No Growth Final    Culture - Blood (collected 07 Apr 2023 15:30)  Source: .Blood Blood-Peripheral  Final Report (12 Apr 2023 22:00):    No Growth Final      COVID-19 PCR: NotDetec (04-12-23 @ 23:12)        Culture - Urine (collected 04-07-23 @ 18:55)  Source: Clean Catch Clean Catch (Midstream)  Final Report (04-09-23 @ 11:03):    >=3 organisms. Probable collection contamination.    Culture - Blood (collected 04-07-23 @ 15:30)  Source: .Blood Blood-Peripheral  Final Report (04-12-23 @ 22:00):    No Growth Final    Culture - Blood (collected 04-07-23 @ 15:30)  Source: .Blood Blood-Peripheral  Final Report (04-12-23 @ 22:00):    No Growth Final        RADIOLOGY & ADDITIONAL TESTS: Personally reviewed.   < from: 12 Lead ECG (04.14.23 @ 07:59) >  Ventricular Rate 99 BPM    Atrial Rate 115 BPM    QRS Duration 80 ms    Q-T Interval 354 ms    QTC Calculation(Bazett) 454 ms    R Axis 51 degrees    T Axis -38 degrees    Diagnosis Line Atrial fibrillation  Nonspecific T wave abnormality  AbnormalECG  When compared with ECG of 07-APR-2023 15:10,  No significant change was found    Confirmed by NATHAN RUBY MD ([id number]) on 4/14/2023 9:06:47 AM    < end of copied text >  personally reviewed EKG a fib 115    < from: NM Pulmonary Perfusion Scan (04.14.23 @ 10:18) >    ACC: [id number] EXAM:  NM PULM PERFUSION IMG   ORDERED BY: KEN LEGGETT     PROCEDURE DATE:  04/14/2023          INTERPRETATION:  RADIOPHARMACEUTICAL:  3.6 mCi Tc-99m-MAA, I.V.    CLINICAL INFORMATION: 89 year old female with tachycardia; referred to   evaluate for pulmonary embolism.    TECHNIQUE:  Perfusion images of the lungs were obtained following   administration of Tc-99m-MAA. Images were obtained in the anterior,   posterior, both lateral, and all 4 oblique projections. The study was   interpreted in conjunction with a chest radiograph of 4/13/2020.    COMPARISON: No previous lung scans were available for comparison.    FINDINGS: There is heterogeneous distribution of radiopharmaceutical in   both lungs on the perfusion images.There are no segmental perfusion   defects in either lung.    IMPRESSION: Very low probability of pulmonary embolus.    --- End of Report ---            SYLVIE RAMIREZ MD; Attending Nuclear Medicine  This document has been electronically signed. Apr 14 2023 10:24AM    < end of copied text >    Consultant(s) Notes Reviewed:  [x] YES  [ ] NO   Discussed with TRESA/SINDHU, RN     Patient is a 89y old  Female who presents with a chief complaint of Shortness of Breath (14 Apr 2023 11:57)      INTERVAL HPI/OVERNIGHT EVENTS: Patient seen and examined at bedside. No overnight events.    MEDICATIONS  (STANDING):  amLODIPine   Tablet 5 milliGRAM(s) Oral daily  atorvastatin 20 milliGRAM(s) Oral at bedtime  budesonide  80 MICROgram(s)/formoterol 4.5 MICROgram(s) Inhaler 2 Puff(s) Inhalation two times a day  cloNIDine 0.2 milliGRAM(s) Oral two times a day  dextrose 5%. 1000 milliLiter(s) (100 mL/Hr) IV Continuous <Continuous>  dextrose 5%. 1000 milliLiter(s) (50 mL/Hr) IV Continuous <Continuous>  dextrose 50% Injectable 25 Gram(s) IV Push once  dextrose 50% Injectable 12.5 Gram(s) IV Push once  dextrose 50% Injectable 25 Gram(s) IV Push once  ferrous    sulfate 325 milliGRAM(s) Oral daily  folic acid 1 milliGRAM(s) Oral daily  glucagon  Injectable 1 milliGRAM(s) IntraMuscular once  heparin  Infusion.  Unit(s)/Hr (6.5 mL/Hr) IV Continuous <Continuous>  insulin glargine Injectable (LANTUS) 5 Unit(s) SubCutaneous at bedtime  insulin lispro (ADMELOG) corrective regimen sliding scale   SubCutaneous three times a day before meals  insulin lispro (ADMELOG) corrective regimen sliding scale   SubCutaneous at bedtime  levothyroxine 25 MICROGram(s) Oral daily  pantoprazole   Suspension 40 milliGRAM(s) Oral daily  senna 2 Tablet(s) Oral at bedtime    MEDICATIONS  (PRN):  acetaminophen     Tablet .. 650 milliGRAM(s) Oral every 6 hours PRN Temp greater or equal to 38C (100.4F), Mild Pain (1 - 3)  albuterol    0.083% 2.5 milliGRAM(s) Nebulizer every 6 hours PRN Shortness of Breath and/or Wheezing  dextrose Oral Gel 15 Gram(s) Oral once PRN Blood Glucose LESS THAN 70 milliGRAM(s)/deciliter  heparin   Injectable 3200 Unit(s) IV Push every 6 hours PRN For aPTT less than 40  melatonin 3 milliGRAM(s) Oral at bedtime PRN Insomnia  ondansetron Injectable 4 milliGRAM(s) IV Push every 8 hours PRN Nausea and/or Vomiting  polyethylene glycol 3350 17 Gram(s) Oral daily PRN Constipation      Allergies    No Known Allergies    Intolerances        REVIEW OF SYSTEMS:  CONSTITUTIONAL: No fever or chills  CARDIOVASCULAR: No chest pain, palpitations    Vital Signs Last 24 Hrs  T(C): 36.3 (14 Apr 2023 06:07), Max: 36.3 (13 Apr 2023 16:42)  T(F): 97.3 (14 Apr 2023 06:07), Max: 97.3 (13 Apr 2023 16:42)  HR: 100 (14 Apr 2023 06:07) (78 - 100)  BP: 167/96 (14 Apr 2023 06:07) (144/61 - 167/96)  BP(mean): --  RR: 16 (14 Apr 2023 06:07) (16 - 17)  SpO2: 98% (14 Apr 2023 10:38) (94% - 100%)    Parameters below as of 14 Apr 2023 06:07  Patient On (Oxygen Delivery Method): room air      I&O's Summary    13 Apr 2023 07:01  -  14 Apr 2023 07:00  --------------------------------------------------------  IN: 84.5 mL / OUT: 1275 mL / NET: -1190.5 mL          PHYSICAL EXAM:  GENERAL: NAD, frail elderly female  HEENT:  AT/NC, anicteric, moist mucous membranes, EOMI, PERRL, no lid-lag, conjunctiva and sclera clear  CHEST/LUNG:  CTA b/l, no rales, wheezes, or rhonchi,  normal respiratory effort, no intercostal retractions  HEART:  IRRR, tachycardia S1, S2, 2/6 systolic murmurs; no pitting edema  ABDOMEN:  BS+, soft, nontender, nondistended  MSK/EXTREMITIES: palpable peripheral pulses, no clubbing or cyanosis  NERVOUS SYSTEM: Alert, follows simple commands, grossly moves all extremities    LABS: Personally reviewed                        8.9    9.82  )-----------( 247      ( 14 Apr 2023 05:00 )             27.2     04-14    134  |  96  |  61  ----------------------------<  142  4.8   |  32  |  1.77    Ca    8.5      14 Apr 2023 05:00            PT/INR - ( 14 Apr 2023 05:00 )   PT: 11.0 sec;   INR: 0.95 ratio         PTT - ( 14 Apr 2023 06:05 )  PTT:69.1 sec                        POCT Blood Glucose.: 143 mg/dL (14 Apr 2023 07:45)  POCT Blood Glucose.: 154 mg/dL (13 Apr 2023 21:16)  POCT Blood Glucose.: 201 mg/dL (13 Apr 2023 17:12)  POCT Blood Glucose.: 279 mg/dL (13 Apr 2023 12:19)          Culture - Urine (collected 07 Apr 2023 18:55)  Source: Clean Catch Clean Catch (Midstream)  Final Report (09 Apr 2023 11:03):    >=3 organisms. Probable collection contamination.    Culture - Blood (collected 07 Apr 2023 15:30)  Source: .Blood Blood-Peripheral  Final Report (12 Apr 2023 22:00):    No Growth Final    Culture - Blood (collected 07 Apr 2023 15:30)  Source: .Blood Blood-Peripheral  Final Report (12 Apr 2023 22:00):    No Growth Final      COVID-19 PCR: NotDetec (04-12-23 @ 23:12)        Culture - Urine (collected 04-07-23 @ 18:55)  Source: Clean Catch Clean Catch (Midstream)  Final Report (04-09-23 @ 11:03):    >=3 organisms. Probable collection contamination.    Culture - Blood (collected 04-07-23 @ 15:30)  Source: .Blood Blood-Peripheral  Final Report (04-12-23 @ 22:00):    No Growth Final    Culture - Blood (collected 04-07-23 @ 15:30)  Source: .Blood Blood-Peripheral  Final Report (04-12-23 @ 22:00):    No Growth Final        RADIOLOGY & ADDITIONAL TESTS: Personally reviewed.   < from: 12 Lead ECG (04.14.23 @ 07:59) >  Ventricular Rate 99 BPM    Atrial Rate 115 BPM    QRS Duration 80 ms    Q-T Interval 354 ms    QTC Calculation(Bazett) 454 ms    R Axis 51 degrees    T Axis -38 degrees    Diagnosis Line Atrial fibrillation  Nonspecific T wave abnormality  AbnormalECG  When compared with ECG of 07-APR-2023 15:10,  No significant change was found    Confirmed by NATHAN RUBY MD ([id number]) on 4/14/2023 9:06:47 AM    < end of copied text >  personally reviewed EKG a fib 115    < from: NM Pulmonary Perfusion Scan (04.14.23 @ 10:18) >    ACC: [id number] EXAM:  NM PULM PERFUSION IMG   ORDERED BY: KEN LEGGETT     PROCEDURE DATE:  04/14/2023          INTERPRETATION:  RADIOPHARMACEUTICAL:  3.6 mCi Tc-99m-MAA, I.V.    CLINICAL INFORMATION: 89 year old female with tachycardia; referred to   evaluate for pulmonary embolism.    TECHNIQUE:  Perfusion images of the lungs were obtained following   administration of Tc-99m-MAA. Images were obtained in the anterior,   posterior, both lateral, and all 4 oblique projections. The study was   interpreted in conjunction with a chest radiograph of 4/13/2020.    COMPARISON: No previous lung scans were available for comparison.    FINDINGS: There is heterogeneous distribution of radiopharmaceutical in   both lungs on the perfusion images.There are no segmental perfusion   defects in either lung.    IMPRESSION: Very low probability of pulmonary embolus.    --- End of Report ---            SYLVIE RAMIREZ MD; Attending Nuclear Medicine  This document has been electronically signed. Apr 14 2023 10:24AM    < end of copied text >    Consultant(s) Notes Reviewed:  [x] YES  [ ] NO   Discussed with TRESA/SINDHU, RN     Patient is a 89y old  Female who presents with a chief complaint of Shortness of Breath (14 Apr 2023 11:57)      INTERVAL HPI/OVERNIGHT EVENTS: Patient seen and examined at bedside. No overnight events.    MEDICATIONS  (STANDING):  amLODIPine   Tablet 5 milliGRAM(s) Oral daily  atorvastatin 20 milliGRAM(s) Oral at bedtime  budesonide  80 MICROgram(s)/formoterol 4.5 MICROgram(s) Inhaler 2 Puff(s) Inhalation two times a day  cloNIDine 0.2 milliGRAM(s) Oral two times a day  dextrose 5%. 1000 milliLiter(s) (100 mL/Hr) IV Continuous <Continuous>  dextrose 5%. 1000 milliLiter(s) (50 mL/Hr) IV Continuous <Continuous>  dextrose 50% Injectable 25 Gram(s) IV Push once  dextrose 50% Injectable 12.5 Gram(s) IV Push once  dextrose 50% Injectable 25 Gram(s) IV Push once  ferrous    sulfate 325 milliGRAM(s) Oral daily  folic acid 1 milliGRAM(s) Oral daily  glucagon  Injectable 1 milliGRAM(s) IntraMuscular once  heparin  Infusion.  Unit(s)/Hr (6.5 mL/Hr) IV Continuous <Continuous>  insulin glargine Injectable (LANTUS) 5 Unit(s) SubCutaneous at bedtime  insulin lispro (ADMELOG) corrective regimen sliding scale   SubCutaneous three times a day before meals  insulin lispro (ADMELOG) corrective regimen sliding scale   SubCutaneous at bedtime  levothyroxine 25 MICROGram(s) Oral daily  pantoprazole   Suspension 40 milliGRAM(s) Oral daily  senna 2 Tablet(s) Oral at bedtime    MEDICATIONS  (PRN):  acetaminophen     Tablet .. 650 milliGRAM(s) Oral every 6 hours PRN Temp greater or equal to 38C (100.4F), Mild Pain (1 - 3)  albuterol    0.083% 2.5 milliGRAM(s) Nebulizer every 6 hours PRN Shortness of Breath and/or Wheezing  dextrose Oral Gel 15 Gram(s) Oral once PRN Blood Glucose LESS THAN 70 milliGRAM(s)/deciliter  heparin   Injectable 3200 Unit(s) IV Push every 6 hours PRN For aPTT less than 40  melatonin 3 milliGRAM(s) Oral at bedtime PRN Insomnia  ondansetron Injectable 4 milliGRAM(s) IV Push every 8 hours PRN Nausea and/or Vomiting  polyethylene glycol 3350 17 Gram(s) Oral daily PRN Constipation      Allergies    No Known Allergies    Intolerances        REVIEW OF SYSTEMS:  CONSTITUTIONAL: No fever or chills  CARDIOVASCULAR: No chest pain, palpitations    Vital Signs Last 24 Hrs  T(C): 36.3 (14 Apr 2023 06:07), Max: 36.3 (13 Apr 2023 16:42)  T(F): 97.3 (14 Apr 2023 06:07), Max: 97.3 (13 Apr 2023 16:42)  HR: 100 (14 Apr 2023 06:07) (78 - 100)  BP: 167/96 (14 Apr 2023 06:07) (144/61 - 167/96)  BP(mean): --  RR: 16 (14 Apr 2023 06:07) (16 - 17)  SpO2: 98% (14 Apr 2023 10:38) (94% - 100%)    Parameters below as of 14 Apr 2023 06:07  Patient On (Oxygen Delivery Method): room air      I&O's Summary    13 Apr 2023 07:01  -  14 Apr 2023 07:00  --------------------------------------------------------  IN: 84.5 mL / OUT: 1275 mL / NET: -1190.5 mL          PHYSICAL EXAM:  GENERAL: NAD, frail elderly female  HEENT:  AT/NC, anicteric, moist mucous membranes, EOMI, PERRL, no lid-lag, conjunctiva and sclera clear  CHEST/LUNG:  CTA b/l, no rales, wheezes, or rhonchi,  normal respiratory effort, no intercostal retractions  HEART:  IRRR, tachycardia S1, S2, 2/6 systolic murmurs; no pitting edema  ABDOMEN:  BS+, soft, nontender, nondistended  MSK/EXTREMITIES: palpable peripheral pulses, no clubbing or cyanosis  NERVOUS SYSTEM: Alert, follows simple commands, grossly moves all extremities    LABS: Personally reviewed                        8.9    9.82  )-----------( 247      ( 14 Apr 2023 05:00 )             27.2     04-14    134  |  96  |  61  ----------------------------<  142  4.8   |  32  |  1.77    Ca    8.5      14 Apr 2023 05:00            PT/INR - ( 14 Apr 2023 05:00 )   PT: 11.0 sec;   INR: 0.95 ratio         PTT - ( 14 Apr 2023 06:05 )  PTT:69.1 sec                        POCT Blood Glucose.: 143 mg/dL (14 Apr 2023 07:45)  POCT Blood Glucose.: 154 mg/dL (13 Apr 2023 21:16)  POCT Blood Glucose.: 201 mg/dL (13 Apr 2023 17:12)  POCT Blood Glucose.: 279 mg/dL (13 Apr 2023 12:19)          Culture - Urine (collected 07 Apr 2023 18:55)  Source: Clean Catch Clean Catch (Midstream)  Final Report (09 Apr 2023 11:03):    >=3 organisms. Probable collection contamination.    Culture - Blood (collected 07 Apr 2023 15:30)  Source: .Blood Blood-Peripheral  Final Report (12 Apr 2023 22:00):    No Growth Final    Culture - Blood (collected 07 Apr 2023 15:30)  Source: .Blood Blood-Peripheral  Final Report (12 Apr 2023 22:00):    No Growth Final      COVID-19 PCR: NotDetec (04-12-23 @ 23:12)        Culture - Urine (collected 04-07-23 @ 18:55)  Source: Clean Catch Clean Catch (Midstream)  Final Report (04-09-23 @ 11:03):    >=3 organisms. Probable collection contamination.    Culture - Blood (collected 04-07-23 @ 15:30)  Source: .Blood Blood-Peripheral  Final Report (04-12-23 @ 22:00):    No Growth Final    Culture - Blood (collected 04-07-23 @ 15:30)  Source: .Blood Blood-Peripheral  Final Report (04-12-23 @ 22:00):    No Growth Final        RADIOLOGY & ADDITIONAL TESTS: Personally reviewed.   < from: 12 Lead ECG (04.14.23 @ 07:59) >  Ventricular Rate 99 BPM    Atrial Rate 115 BPM    QRS Duration 80 ms    Q-T Interval 354 ms    QTC Calculation(Bazett) 454 ms    R Axis 51 degrees    T Axis -38 degrees    Diagnosis Line Atrial fibrillation  Nonspecific T wave abnormality  AbnormalECG  When compared with ECG of 07-APR-2023 15:10,  No significant change was found    Confirmed by NATHAN RUBY MD ([id number]) on 4/14/2023 9:06:47 AM    < end of copied text >  personally reviewed EKG a fib 115    < from: NM Pulmonary Perfusion Scan (04.14.23 @ 10:18) >    ACC: [id number] EXAM:  NM PULM PERFUSION IMG   ORDERED BY: KEN LEGGETT     PROCEDURE DATE:  04/14/2023          INTERPRETATION:  RADIOPHARMACEUTICAL:  3.6 mCi Tc-99m-MAA, I.V.    CLINICAL INFORMATION: 89 year old female with tachycardia; referred to   evaluate for pulmonary embolism.    TECHNIQUE:  Perfusion images of the lungs were obtained following   administration of Tc-99m-MAA. Images were obtained in the anterior,   posterior, both lateral, and all 4 oblique projections. The study was   interpreted in conjunction with a chest radiograph of 4/13/2020.    COMPARISON: No previous lung scans were available for comparison.    FINDINGS: There is heterogeneous distribution of radiopharmaceutical in   both lungs on the perfusion images.There are no segmental perfusion   defects in either lung.    IMPRESSION: Very low probability of pulmonary embolus.    --- End of Report ---            SYLVIE RAMIREZ MD; Attending Nuclear Medicine  This document has been electronically signed. Apr 14 2023 10:24AM    < end of copied text >    Consultant(s) Notes Reviewed:  [x] YES  [ ] NO discussed with Milagros FLORES (GI) plan to restart AC with close monitoring. Continue PPI.   Discussed with TRESA/SINDHU, RN     Patient is a 89y old  Female who presents with a chief complaint of Shortness of Breath (14 Apr 2023 11:57)      INTERVAL HPI/OVERNIGHT EVENTS: Patient seen and examined at bedside. No overnight events.    MEDICATIONS  (STANDING):  amLODIPine   Tablet 5 milliGRAM(s) Oral daily  atorvastatin 20 milliGRAM(s) Oral at bedtime  budesonide  80 MICROgram(s)/formoterol 4.5 MICROgram(s) Inhaler 2 Puff(s) Inhalation two times a day  cloNIDine 0.2 milliGRAM(s) Oral two times a day  dextrose 5%. 1000 milliLiter(s) (100 mL/Hr) IV Continuous <Continuous>  dextrose 5%. 1000 milliLiter(s) (50 mL/Hr) IV Continuous <Continuous>  dextrose 50% Injectable 25 Gram(s) IV Push once  dextrose 50% Injectable 12.5 Gram(s) IV Push once  dextrose 50% Injectable 25 Gram(s) IV Push once  ferrous    sulfate 325 milliGRAM(s) Oral daily  folic acid 1 milliGRAM(s) Oral daily  glucagon  Injectable 1 milliGRAM(s) IntraMuscular once  heparin  Infusion.  Unit(s)/Hr (6.5 mL/Hr) IV Continuous <Continuous>  insulin glargine Injectable (LANTUS) 5 Unit(s) SubCutaneous at bedtime  insulin lispro (ADMELOG) corrective regimen sliding scale   SubCutaneous three times a day before meals  insulin lispro (ADMELOG) corrective regimen sliding scale   SubCutaneous at bedtime  levothyroxine 25 MICROGram(s) Oral daily  pantoprazole   Suspension 40 milliGRAM(s) Oral daily  senna 2 Tablet(s) Oral at bedtime    MEDICATIONS  (PRN):  acetaminophen     Tablet .. 650 milliGRAM(s) Oral every 6 hours PRN Temp greater or equal to 38C (100.4F), Mild Pain (1 - 3)  albuterol    0.083% 2.5 milliGRAM(s) Nebulizer every 6 hours PRN Shortness of Breath and/or Wheezing  dextrose Oral Gel 15 Gram(s) Oral once PRN Blood Glucose LESS THAN 70 milliGRAM(s)/deciliter  heparin   Injectable 3200 Unit(s) IV Push every 6 hours PRN For aPTT less than 40  melatonin 3 milliGRAM(s) Oral at bedtime PRN Insomnia  ondansetron Injectable 4 milliGRAM(s) IV Push every 8 hours PRN Nausea and/or Vomiting  polyethylene glycol 3350 17 Gram(s) Oral daily PRN Constipation      Allergies    No Known Allergies    Intolerances        REVIEW OF SYSTEMS:  CONSTITUTIONAL: No fever or chills  CARDIOVASCULAR: No chest pain, palpitations    Vital Signs Last 24 Hrs  T(C): 36.3 (14 Apr 2023 06:07), Max: 36.3 (13 Apr 2023 16:42)  T(F): 97.3 (14 Apr 2023 06:07), Max: 97.3 (13 Apr 2023 16:42)  HR: 100 (14 Apr 2023 06:07) (78 - 100)  BP: 167/96 (14 Apr 2023 06:07) (144/61 - 167/96)  BP(mean): --  RR: 16 (14 Apr 2023 06:07) (16 - 17)  SpO2: 98% (14 Apr 2023 10:38) (94% - 100%)    Parameters below as of 14 Apr 2023 06:07  Patient On (Oxygen Delivery Method): room air      I&O's Summary    13 Apr 2023 07:01  -  14 Apr 2023 07:00  --------------------------------------------------------  IN: 84.5 mL / OUT: 1275 mL / NET: -1190.5 mL          PHYSICAL EXAM:  GENERAL: NAD, frail elderly female  HEENT:  AT/NC, anicteric, moist mucous membranes, EOMI, PERRL, no lid-lag, conjunctiva and sclera clear  CHEST/LUNG:  CTA b/l, no rales, wheezes, or rhonchi,  normal respiratory effort, no intercostal retractions  HEART:  IRRR, tachycardia S1, S2, 2/6 systolic murmurs; no pitting edema  ABDOMEN:  BS+, soft, nontender, nondistended  MSK/EXTREMITIES: palpable peripheral pulses, no clubbing or cyanosis  NERVOUS SYSTEM: Alert, follows simple commands, grossly moves all extremities    LABS: Personally reviewed                        8.9    9.82  )-----------( 247      ( 14 Apr 2023 05:00 )             27.2     04-14    134  |  96  |  61  ----------------------------<  142  4.8   |  32  |  1.77    Ca    8.5      14 Apr 2023 05:00            PT/INR - ( 14 Apr 2023 05:00 )   PT: 11.0 sec;   INR: 0.95 ratio         PTT - ( 14 Apr 2023 06:05 )  PTT:69.1 sec                        POCT Blood Glucose.: 143 mg/dL (14 Apr 2023 07:45)  POCT Blood Glucose.: 154 mg/dL (13 Apr 2023 21:16)  POCT Blood Glucose.: 201 mg/dL (13 Apr 2023 17:12)  POCT Blood Glucose.: 279 mg/dL (13 Apr 2023 12:19)          Culture - Urine (collected 07 Apr 2023 18:55)  Source: Clean Catch Clean Catch (Midstream)  Final Report (09 Apr 2023 11:03):    >=3 organisms. Probable collection contamination.    Culture - Blood (collected 07 Apr 2023 15:30)  Source: .Blood Blood-Peripheral  Final Report (12 Apr 2023 22:00):    No Growth Final    Culture - Blood (collected 07 Apr 2023 15:30)  Source: .Blood Blood-Peripheral  Final Report (12 Apr 2023 22:00):    No Growth Final      COVID-19 PCR: NotDetec (04-12-23 @ 23:12)        Culture - Urine (collected 04-07-23 @ 18:55)  Source: Clean Catch Clean Catch (Midstream)  Final Report (04-09-23 @ 11:03):    >=3 organisms. Probable collection contamination.    Culture - Blood (collected 04-07-23 @ 15:30)  Source: .Blood Blood-Peripheral  Final Report (04-12-23 @ 22:00):    No Growth Final    Culture - Blood (collected 04-07-23 @ 15:30)  Source: .Blood Blood-Peripheral  Final Report (04-12-23 @ 22:00):    No Growth Final        RADIOLOGY & ADDITIONAL TESTS: Personally reviewed.   < from: 12 Lead ECG (04.14.23 @ 07:59) >  Ventricular Rate 99 BPM    Atrial Rate 115 BPM    QRS Duration 80 ms    Q-T Interval 354 ms    QTC Calculation(Bazett) 454 ms    R Axis 51 degrees    T Axis -38 degrees    Diagnosis Line Atrial fibrillation  Nonspecific T wave abnormality  AbnormalECG  When compared with ECG of 07-APR-2023 15:10,  No significant change was found    Confirmed by NATHAN RUBY MD ([id number]) on 4/14/2023 9:06:47 AM    < end of copied text >  personally reviewed EKG a fib 115    < from: NM Pulmonary Perfusion Scan (04.14.23 @ 10:18) >    ACC: [id number] EXAM:  NM PULM PERFUSION IMG   ORDERED BY: KEN LEGGETT     PROCEDURE DATE:  04/14/2023          INTERPRETATION:  RADIOPHARMACEUTICAL:  3.6 mCi Tc-99m-MAA, I.V.    CLINICAL INFORMATION: 89 year old female with tachycardia; referred to   evaluate for pulmonary embolism.    TECHNIQUE:  Perfusion images of the lungs were obtained following   administration of Tc-99m-MAA. Images were obtained in the anterior,   posterior, both lateral, and all 4 oblique projections. The study was   interpreted in conjunction with a chest radiograph of 4/13/2020.    COMPARISON: No previous lung scans were available for comparison.    FINDINGS: There is heterogeneous distribution of radiopharmaceutical in   both lungs on the perfusion images.There are no segmental perfusion   defects in either lung.    IMPRESSION: Very low probability of pulmonary embolus.    --- End of Report ---            SYLVIE RAMIREZ MD; Attending Nuclear Medicine  This document has been electronically signed. Apr 14 2023 10:24AM    < end of copied text >    Consultant(s) Notes Reviewed:  [x] YES  [ ] NO discussed with Milagros FLORES (GI) plan to restart AC with close monitoring. Continue PPI.   Discussed with TRESA/SINDHU, RN     Patient is a 89y old  Female who presents with a chief complaint of Shortness of Breath (14 Apr 2023 11:57)      INTERVAL HPI/OVERNIGHT EVENTS: Patient seen and examined at bedside. No overnight events.    MEDICATIONS  (STANDING):  amLODIPine   Tablet 5 milliGRAM(s) Oral daily  atorvastatin 20 milliGRAM(s) Oral at bedtime  budesonide  80 MICROgram(s)/formoterol 4.5 MICROgram(s) Inhaler 2 Puff(s) Inhalation two times a day  cloNIDine 0.2 milliGRAM(s) Oral two times a day  dextrose 5%. 1000 milliLiter(s) (100 mL/Hr) IV Continuous <Continuous>  dextrose 5%. 1000 milliLiter(s) (50 mL/Hr) IV Continuous <Continuous>  dextrose 50% Injectable 25 Gram(s) IV Push once  dextrose 50% Injectable 12.5 Gram(s) IV Push once  dextrose 50% Injectable 25 Gram(s) IV Push once  ferrous    sulfate 325 milliGRAM(s) Oral daily  folic acid 1 milliGRAM(s) Oral daily  glucagon  Injectable 1 milliGRAM(s) IntraMuscular once  heparin  Infusion.  Unit(s)/Hr (6.5 mL/Hr) IV Continuous <Continuous>  insulin glargine Injectable (LANTUS) 5 Unit(s) SubCutaneous at bedtime  insulin lispro (ADMELOG) corrective regimen sliding scale   SubCutaneous three times a day before meals  insulin lispro (ADMELOG) corrective regimen sliding scale   SubCutaneous at bedtime  levothyroxine 25 MICROGram(s) Oral daily  pantoprazole   Suspension 40 milliGRAM(s) Oral daily  senna 2 Tablet(s) Oral at bedtime    MEDICATIONS  (PRN):  acetaminophen     Tablet .. 650 milliGRAM(s) Oral every 6 hours PRN Temp greater or equal to 38C (100.4F), Mild Pain (1 - 3)  albuterol    0.083% 2.5 milliGRAM(s) Nebulizer every 6 hours PRN Shortness of Breath and/or Wheezing  dextrose Oral Gel 15 Gram(s) Oral once PRN Blood Glucose LESS THAN 70 milliGRAM(s)/deciliter  heparin   Injectable 3200 Unit(s) IV Push every 6 hours PRN For aPTT less than 40  melatonin 3 milliGRAM(s) Oral at bedtime PRN Insomnia  ondansetron Injectable 4 milliGRAM(s) IV Push every 8 hours PRN Nausea and/or Vomiting  polyethylene glycol 3350 17 Gram(s) Oral daily PRN Constipation      Allergies    No Known Allergies    Intolerances        REVIEW OF SYSTEMS:  CONSTITUTIONAL: No fever or chills  CARDIOVASCULAR: No chest pain, palpitations    Vital Signs Last 24 Hrs  T(C): 36.3 (14 Apr 2023 06:07), Max: 36.3 (13 Apr 2023 16:42)  T(F): 97.3 (14 Apr 2023 06:07), Max: 97.3 (13 Apr 2023 16:42)  HR: 100 (14 Apr 2023 06:07) (78 - 100)  BP: 167/96 (14 Apr 2023 06:07) (144/61 - 167/96)  BP(mean): --  RR: 16 (14 Apr 2023 06:07) (16 - 17)  SpO2: 98% (14 Apr 2023 10:38) (94% - 100%)    Parameters below as of 14 Apr 2023 06:07  Patient On (Oxygen Delivery Method): room air      I&O's Summary    13 Apr 2023 07:01  -  14 Apr 2023 07:00  --------------------------------------------------------  IN: 84.5 mL / OUT: 1275 mL / NET: -1190.5 mL          PHYSICAL EXAM:  GENERAL: NAD, frail elderly female  HEENT:  AT/NC, anicteric, moist mucous membranes, EOMI, PERRL, no lid-lag, conjunctiva and sclera clear  CHEST/LUNG:  CTA b/l, no rales, wheezes, or rhonchi,  normal respiratory effort, no intercostal retractions  HEART:  IRRR, tachycardia S1, S2, 2/6 systolic murmurs; no pitting edema  ABDOMEN:  BS+, soft, nontender, nondistended  MSK/EXTREMITIES: palpable peripheral pulses, no clubbing or cyanosis  NERVOUS SYSTEM: Alert, follows simple commands, grossly moves all extremities    LABS: Personally reviewed                        8.9    9.82  )-----------( 247      ( 14 Apr 2023 05:00 )             27.2     04-14    134  |  96  |  61  ----------------------------<  142  4.8   |  32  |  1.77    Ca    8.5      14 Apr 2023 05:00            PT/INR - ( 14 Apr 2023 05:00 )   PT: 11.0 sec;   INR: 0.95 ratio         PTT - ( 14 Apr 2023 06:05 )  PTT:69.1 sec                        POCT Blood Glucose.: 143 mg/dL (14 Apr 2023 07:45)  POCT Blood Glucose.: 154 mg/dL (13 Apr 2023 21:16)  POCT Blood Glucose.: 201 mg/dL (13 Apr 2023 17:12)  POCT Blood Glucose.: 279 mg/dL (13 Apr 2023 12:19)          Culture - Urine (collected 07 Apr 2023 18:55)  Source: Clean Catch Clean Catch (Midstream)  Final Report (09 Apr 2023 11:03):    >=3 organisms. Probable collection contamination.    Culture - Blood (collected 07 Apr 2023 15:30)  Source: .Blood Blood-Peripheral  Final Report (12 Apr 2023 22:00):    No Growth Final    Culture - Blood (collected 07 Apr 2023 15:30)  Source: .Blood Blood-Peripheral  Final Report (12 Apr 2023 22:00):    No Growth Final      COVID-19 PCR: NotDetec (04-12-23 @ 23:12)        Culture - Urine (collected 04-07-23 @ 18:55)  Source: Clean Catch Clean Catch (Midstream)  Final Report (04-09-23 @ 11:03):    >=3 organisms. Probable collection contamination.    Culture - Blood (collected 04-07-23 @ 15:30)  Source: .Blood Blood-Peripheral  Final Report (04-12-23 @ 22:00):    No Growth Final    Culture - Blood (collected 04-07-23 @ 15:30)  Source: .Blood Blood-Peripheral  Final Report (04-12-23 @ 22:00):    No Growth Final        RADIOLOGY & ADDITIONAL TESTS: Personally reviewed.   < from: 12 Lead ECG (04.14.23 @ 07:59) >  Ventricular Rate 99 BPM    Atrial Rate 115 BPM    QRS Duration 80 ms    Q-T Interval 354 ms    QTC Calculation(Bazett) 454 ms    R Axis 51 degrees    T Axis -38 degrees    Diagnosis Line Atrial fibrillation  Nonspecific T wave abnormality  AbnormalECG  When compared with ECG of 07-APR-2023 15:10,  No significant change was found    Confirmed by NATHAN RUBY MD ([id number]) on 4/14/2023 9:06:47 AM    < end of copied text >  personally reviewed EKG a fib 115    < from: NM Pulmonary Perfusion Scan (04.14.23 @ 10:18) >    ACC: [id number] EXAM:  NM PULM PERFUSION IMG   ORDERED BY: KEN LEGGETT     PROCEDURE DATE:  04/14/2023          INTERPRETATION:  RADIOPHARMACEUTICAL:  3.6 mCi Tc-99m-MAA, I.V.    CLINICAL INFORMATION: 89 year old female with tachycardia; referred to   evaluate for pulmonary embolism.    TECHNIQUE:  Perfusion images of the lungs were obtained following   administration of Tc-99m-MAA. Images were obtained in the anterior,   posterior, both lateral, and all 4 oblique projections. The study was   interpreted in conjunction with a chest radiograph of 4/13/2020.    COMPARISON: No previous lung scans were available for comparison.    FINDINGS: There is heterogeneous distribution of radiopharmaceutical in   both lungs on the perfusion images.There are no segmental perfusion   defects in either lung.    IMPRESSION: Very low probability of pulmonary embolus.    --- End of Report ---            SYLVIE RAMIREZ MD; Attending Nuclear Medicine  This document has been electronically signed. Apr 14 2023 10:24AM    < end of copied text >    Consultant(s) Notes Reviewed:  [x] YES  [ ] NO discussed with Milagros FLORES (GI) plan to restart AC with close monitoring. Continue PPI.   Discussed with TRESA/SINDHU, RN

## 2023-04-14 NOTE — PROGRESS NOTE ADULT - SUBJECTIVE AND OBJECTIVE BOX
INTERVAL HPI/OVERNIGHT EVENTS:  HPI:    88 y/o female admitted with SOB. Patient seen and examined. No reports of melena, BRBPR, hematemesis or coffee ground emesis. No abdominal pain.     MEDICATIONS  (STANDING):  amLODIPine   Tablet 5 milliGRAM(s) Oral daily  atorvastatin 20 milliGRAM(s) Oral at bedtime  budesonide  80 MICROgram(s)/formoterol 4.5 MICROgram(s) Inhaler 2 Puff(s) Inhalation two times a day  cloNIDine 0.2 milliGRAM(s) Oral two times a day  dextrose 5%. 1000 milliLiter(s) (50 mL/Hr) IV Continuous <Continuous>  dextrose 5%. 1000 milliLiter(s) (100 mL/Hr) IV Continuous <Continuous>  dextrose 50% Injectable 25 Gram(s) IV Push once  dextrose 50% Injectable 12.5 Gram(s) IV Push once  dextrose 50% Injectable 25 Gram(s) IV Push once  ferrous    sulfate 325 milliGRAM(s) Oral daily  folic acid 1 milliGRAM(s) Oral daily  glucagon  Injectable 1 milliGRAM(s) IntraMuscular once  heparin  Infusion.  Unit(s)/Hr (6.5 mL/Hr) IV Continuous <Continuous>  insulin glargine Injectable (LANTUS) 5 Unit(s) SubCutaneous at bedtime  insulin lispro (ADMELOG) corrective regimen sliding scale   SubCutaneous three times a day before meals  insulin lispro (ADMELOG) corrective regimen sliding scale   SubCutaneous at bedtime  levothyroxine 25 MICROGram(s) Oral daily  pantoprazole   Suspension 40 milliGRAM(s) Oral daily  senna 2 Tablet(s) Oral at bedtime    MEDICATIONS  (PRN):  acetaminophen     Tablet .. 650 milliGRAM(s) Oral every 6 hours PRN Temp greater or equal to 38C (100.4F), Mild Pain (1 - 3)  albuterol    0.083% 2.5 milliGRAM(s) Nebulizer every 6 hours PRN Shortness of Breath and/or Wheezing  dextrose Oral Gel 15 Gram(s) Oral once PRN Blood Glucose LESS THAN 70 milliGRAM(s)/deciliter  heparin   Injectable 3200 Unit(s) IV Push every 6 hours PRN For aPTT less than 40  melatonin 3 milliGRAM(s) Oral at bedtime PRN Insomnia  ondansetron Injectable 4 milliGRAM(s) IV Push every 8 hours PRN Nausea and/or Vomiting  polyethylene glycol 3350 17 Gram(s) Oral daily PRN Constipation      Allergies    No Known Allergies    Intolerances        PAST MEDICAL & SURGICAL HISTORY:  Moderate asthma      Hypertension      PHYSICAL EXAM:   Vital Signs:  Vital Signs Last 24 Hrs  T(C): 36.3 (14 Apr 2023 06:07), Max: 36.3 (13 Apr 2023 16:42)  T(F): 97.3 (14 Apr 2023 06:07), Max: 97.3 (13 Apr 2023 16:42)  HR: 100 (14 Apr 2023 06:07) (78 - 100)  BP: 167/96 (14 Apr 2023 06:07) (144/61 - 167/96)  BP(mean): --  RR: 16 (14 Apr 2023 06:07) (16 - 17)  SpO2: 98% (14 Apr 2023 10:38) (94% - 100%)    Parameters below as of 14 Apr 2023 06:07  Patient On (Oxygen Delivery Method): room air      Daily     Daily I&O's Summary    13 Apr 2023 07:01  -  14 Apr 2023 07:00  --------------------------------------------------------  IN: 84.5 mL / OUT: 1275 mL / NET: -1190.5 mL        GENERAL:  Appears stated age,  HEENT:  NC/AT,  conjunctivae clear and pink,  CHEST:  Full & symmetric excursion, no increased effort, breath sounds clear  HEART:  Regular rhythm, S1, S2, no murmur  ABDOMEN:  Soft, non-tender, non-distended, normoactive bowel sounds  EXTEREMITIES:  no edema  SKIN:  No rash/warm/dry  NEURO:  Alert,       LABS:                        8.9    9.82  )-----------( 247      ( 14 Apr 2023 05:00 )             27.2     04-14    134<L>  |  96  |  61<H>  ----------------------------<  142<H>  4.8   |  32<H>  |  1.77<H>    Ca    8.5      14 Apr 2023 05:00      PT/INR - ( 14 Apr 2023 05:00 )   PT: 11.0 sec;   INR: 0.95 ratio         PTT - ( 14 Apr 2023 06:05 )  PTT:69.1 sec    amylase   lipase  RADIOLOGY & ADDITIONAL TESTS:   INTERVAL HPI/OVERNIGHT EVENTS:  HPI:    89 y/o female admitted with SOB. Patient seen and examined. No reports of melena, BRBPR, hematemesis or coffee ground emesis. No abdominal pain.     MEDICATIONS  (STANDING):  amLODIPine   Tablet 5 milliGRAM(s) Oral daily  atorvastatin 20 milliGRAM(s) Oral at bedtime  budesonide  80 MICROgram(s)/formoterol 4.5 MICROgram(s) Inhaler 2 Puff(s) Inhalation two times a day  cloNIDine 0.2 milliGRAM(s) Oral two times a day  dextrose 5%. 1000 milliLiter(s) (50 mL/Hr) IV Continuous <Continuous>  dextrose 5%. 1000 milliLiter(s) (100 mL/Hr) IV Continuous <Continuous>  dextrose 50% Injectable 25 Gram(s) IV Push once  dextrose 50% Injectable 12.5 Gram(s) IV Push once  dextrose 50% Injectable 25 Gram(s) IV Push once  ferrous    sulfate 325 milliGRAM(s) Oral daily  folic acid 1 milliGRAM(s) Oral daily  glucagon  Injectable 1 milliGRAM(s) IntraMuscular once  heparin  Infusion.  Unit(s)/Hr (6.5 mL/Hr) IV Continuous <Continuous>  insulin glargine Injectable (LANTUS) 5 Unit(s) SubCutaneous at bedtime  insulin lispro (ADMELOG) corrective regimen sliding scale   SubCutaneous three times a day before meals  insulin lispro (ADMELOG) corrective regimen sliding scale   SubCutaneous at bedtime  levothyroxine 25 MICROGram(s) Oral daily  pantoprazole   Suspension 40 milliGRAM(s) Oral daily  senna 2 Tablet(s) Oral at bedtime    MEDICATIONS  (PRN):  acetaminophen     Tablet .. 650 milliGRAM(s) Oral every 6 hours PRN Temp greater or equal to 38C (100.4F), Mild Pain (1 - 3)  albuterol    0.083% 2.5 milliGRAM(s) Nebulizer every 6 hours PRN Shortness of Breath and/or Wheezing  dextrose Oral Gel 15 Gram(s) Oral once PRN Blood Glucose LESS THAN 70 milliGRAM(s)/deciliter  heparin   Injectable 3200 Unit(s) IV Push every 6 hours PRN For aPTT less than 40  melatonin 3 milliGRAM(s) Oral at bedtime PRN Insomnia  ondansetron Injectable 4 milliGRAM(s) IV Push every 8 hours PRN Nausea and/or Vomiting  polyethylene glycol 3350 17 Gram(s) Oral daily PRN Constipation      Allergies    No Known Allergies    Intolerances        PAST MEDICAL & SURGICAL HISTORY:  Moderate asthma      Hypertension      PHYSICAL EXAM:   Vital Signs:  Vital Signs Last 24 Hrs  T(C): 36.3 (14 Apr 2023 06:07), Max: 36.3 (13 Apr 2023 16:42)  T(F): 97.3 (14 Apr 2023 06:07), Max: 97.3 (13 Apr 2023 16:42)  HR: 100 (14 Apr 2023 06:07) (78 - 100)  BP: 167/96 (14 Apr 2023 06:07) (144/61 - 167/96)  BP(mean): --  RR: 16 (14 Apr 2023 06:07) (16 - 17)  SpO2: 98% (14 Apr 2023 10:38) (94% - 100%)    Parameters below as of 14 Apr 2023 06:07  Patient On (Oxygen Delivery Method): room air      Daily     Daily I&O's Summary    13 Apr 2023 07:01  -  14 Apr 2023 07:00  --------------------------------------------------------  IN: 84.5 mL / OUT: 1275 mL / NET: -1190.5 mL        GENERAL:  Appears stated age,  HEENT:  NC/AT,  conjunctivae clear and pink,  CHEST:  Full & symmetric excursion, no increased effort, breath sounds clear  HEART:  Regular rhythm, S1, S2, no murmur  ABDOMEN:  Soft, non-tender, non-distended, normoactive bowel sounds  EXTEREMITIES:  no edema  SKIN:  No rash/warm/dry  NEURO:  Alert,       LABS:                        8.9    9.82  )-----------( 247      ( 14 Apr 2023 05:00 )             27.2     04-14    134<L>  |  96  |  61<H>  ----------------------------<  142<H>  4.8   |  32<H>  |  1.77<H>    Ca    8.5      14 Apr 2023 05:00      PT/INR - ( 14 Apr 2023 05:00 )   PT: 11.0 sec;   INR: 0.95 ratio         PTT - ( 14 Apr 2023 06:05 )  PTT:69.1 sec    amylase   lipase  RADIOLOGY & ADDITIONAL TESTS:       89 y/o female admitted with SOB. Patient seen and examined. No reports of melena, BRBPR, hematemesis or coffee ground emesis. No abdominal pain.     MEDICATIONS  (STANDING):  amLODIPine   Tablet 5 milliGRAM(s) Oral daily  atorvastatin 20 milliGRAM(s) Oral at bedtime  budesonide  80 MICROgram(s)/formoterol 4.5 MICROgram(s) Inhaler 2 Puff(s) Inhalation two times a day  cloNIDine 0.2 milliGRAM(s) Oral two times a day  dextrose 5%. 1000 milliLiter(s) (50 mL/Hr) IV Continuous <Continuous>  dextrose 5%. 1000 milliLiter(s) (100 mL/Hr) IV Continuous <Continuous>  dextrose 50% Injectable 25 Gram(s) IV Push once  dextrose 50% Injectable 12.5 Gram(s) IV Push once  dextrose 50% Injectable 25 Gram(s) IV Push once  ferrous    sulfate 325 milliGRAM(s) Oral daily  folic acid 1 milliGRAM(s) Oral daily  glucagon  Injectable 1 milliGRAM(s) IntraMuscular once  heparin  Infusion.  Unit(s)/Hr (6.5 mL/Hr) IV Continuous <Continuous>  insulin glargine Injectable (LANTUS) 5 Unit(s) SubCutaneous at bedtime  insulin lispro (ADMELOG) corrective regimen sliding scale   SubCutaneous three times a day before meals  insulin lispro (ADMELOG) corrective regimen sliding scale   SubCutaneous at bedtime  levothyroxine 25 MICROGram(s) Oral daily  pantoprazole   Suspension 40 milliGRAM(s) Oral daily  senna 2 Tablet(s) Oral at bedtime    MEDICATIONS  (PRN):  acetaminophen     Tablet .. 650 milliGRAM(s) Oral every 6 hours PRN Temp greater or equal to 38C (100.4F), Mild Pain (1 - 3)  albuterol    0.083% 2.5 milliGRAM(s) Nebulizer every 6 hours PRN Shortness of Breath and/or Wheezing  dextrose Oral Gel 15 Gram(s) Oral once PRN Blood Glucose LESS THAN 70 milliGRAM(s)/deciliter  heparin   Injectable 3200 Unit(s) IV Push every 6 hours PRN For aPTT less than 40  melatonin 3 milliGRAM(s) Oral at bedtime PRN Insomnia  ondansetron Injectable 4 milliGRAM(s) IV Push every 8 hours PRN Nausea and/or Vomiting  polyethylene glycol 3350 17 Gram(s) Oral daily PRN Constipation      Allergies    No Known Allergies    Intolerances        PAST MEDICAL & SURGICAL HISTORY:  Moderate asthma      Hypertension      PHYSICAL EXAM:   Vital Signs:  Vital Signs Last 24 Hrs  T(C): 36.3 (14 Apr 2023 06:07), Max: 36.3 (13 Apr 2023 16:42)  T(F): 97.3 (14 Apr 2023 06:07), Max: 97.3 (13 Apr 2023 16:42)  HR: 100 (14 Apr 2023 06:07) (78 - 100)  BP: 167/96 (14 Apr 2023 06:07) (144/61 - 167/96)  BP(mean): --  RR: 16 (14 Apr 2023 06:07) (16 - 17)  SpO2: 98% (14 Apr 2023 10:38) (94% - 100%)    Parameters below as of 14 Apr 2023 06:07  Patient On (Oxygen Delivery Method): room air      Daily     Daily I&O's Summary    13 Apr 2023 07:01  -  14 Apr 2023 07:00  --------------------------------------------------------  IN: 84.5 mL / OUT: 1275 mL / NET: -1190.5 mL        GENERAL:  Appears stated age,  HEENT:  NC/AT,  conjunctivae clear and pink,  CHEST:  Full & symmetric excursion, no increased effort, breath sounds clear  HEART:  Regular rhythm, S1, S2, no murmur  ABDOMEN:  Soft, non-tender, non-distended, normoactive bowel sounds  EXTREMITIES:  no edema  SKIN:  No rash/warm/dry  NEURO:  Alert,       LABS:                        8.9    9.82  )-----------( 247      ( 14 Apr 2023 05:00 )             27.2     04-14    134<L>  |  96  |  61<H>  ----------------------------<  142<H>  4.8   |  32<H>  |  1.77<H>    Ca    8.5      14 Apr 2023 05:00      PT/INR - ( 14 Apr 2023 05:00 )   PT: 11.0 sec;   INR: 0.95 ratio         PTT - ( 14 Apr 2023 06:05 )  PTT:69.1 sec    amylase   lipase  RADIOLOGY & ADDITIONAL TESTS:       88 y/o female admitted with SOB. Patient seen and examined. No reports of melena, BRBPR, hematemesis or coffee ground emesis. No abdominal pain.     MEDICATIONS  (STANDING):  amLODIPine   Tablet 5 milliGRAM(s) Oral daily  atorvastatin 20 milliGRAM(s) Oral at bedtime  budesonide  80 MICROgram(s)/formoterol 4.5 MICROgram(s) Inhaler 2 Puff(s) Inhalation two times a day  cloNIDine 0.2 milliGRAM(s) Oral two times a day  dextrose 5%. 1000 milliLiter(s) (50 mL/Hr) IV Continuous <Continuous>  dextrose 5%. 1000 milliLiter(s) (100 mL/Hr) IV Continuous <Continuous>  dextrose 50% Injectable 25 Gram(s) IV Push once  dextrose 50% Injectable 12.5 Gram(s) IV Push once  dextrose 50% Injectable 25 Gram(s) IV Push once  ferrous    sulfate 325 milliGRAM(s) Oral daily  folic acid 1 milliGRAM(s) Oral daily  glucagon  Injectable 1 milliGRAM(s) IntraMuscular once  heparin  Infusion.  Unit(s)/Hr (6.5 mL/Hr) IV Continuous <Continuous>  insulin glargine Injectable (LANTUS) 5 Unit(s) SubCutaneous at bedtime  insulin lispro (ADMELOG) corrective regimen sliding scale   SubCutaneous three times a day before meals  insulin lispro (ADMELOG) corrective regimen sliding scale   SubCutaneous at bedtime  levothyroxine 25 MICROGram(s) Oral daily  pantoprazole   Suspension 40 milliGRAM(s) Oral daily  senna 2 Tablet(s) Oral at bedtime    MEDICATIONS  (PRN):  acetaminophen     Tablet .. 650 milliGRAM(s) Oral every 6 hours PRN Temp greater or equal to 38C (100.4F), Mild Pain (1 - 3)  albuterol    0.083% 2.5 milliGRAM(s) Nebulizer every 6 hours PRN Shortness of Breath and/or Wheezing  dextrose Oral Gel 15 Gram(s) Oral once PRN Blood Glucose LESS THAN 70 milliGRAM(s)/deciliter  heparin   Injectable 3200 Unit(s) IV Push every 6 hours PRN For aPTT less than 40  melatonin 3 milliGRAM(s) Oral at bedtime PRN Insomnia  ondansetron Injectable 4 milliGRAM(s) IV Push every 8 hours PRN Nausea and/or Vomiting  polyethylene glycol 3350 17 Gram(s) Oral daily PRN Constipation      Allergies    No Known Allergies    Intolerances        PAST MEDICAL & SURGICAL HISTORY:  Moderate asthma      Hypertension      PHYSICAL EXAM:   Vital Signs:  Vital Signs Last 24 Hrs  T(C): 36.3 (14 Apr 2023 06:07), Max: 36.3 (13 Apr 2023 16:42)  T(F): 97.3 (14 Apr 2023 06:07), Max: 97.3 (13 Apr 2023 16:42)  HR: 100 (14 Apr 2023 06:07) (78 - 100)  BP: 167/96 (14 Apr 2023 06:07) (144/61 - 167/96)  BP(mean): --  RR: 16 (14 Apr 2023 06:07) (16 - 17)  SpO2: 98% (14 Apr 2023 10:38) (94% - 100%)    Parameters below as of 14 Apr 2023 06:07  Patient On (Oxygen Delivery Method): room air      Daily     Daily I&O's Summary    13 Apr 2023 07:01  -  14 Apr 2023 07:00  --------------------------------------------------------  IN: 84.5 mL / OUT: 1275 mL / NET: -1190.5 mL        GENERAL:  Appears stated age,  HEENT:  NC/AT,  conjunctivae clear and pink,  CHEST:  Full & symmetric excursion, no increased effort, breath sounds clear  HEART:  Regular rhythm, S1, S2, no murmur  ABDOMEN:  Soft, non-tender, non-distended, normoactive bowel sounds  EXTREMITIES:  no edema  SKIN:  No rash/warm/dry  NEURO:  Alert,       LABS:                        8.9    9.82  )-----------( 247      ( 14 Apr 2023 05:00 )             27.2     04-14    134<L>  |  96  |  61<H>  ----------------------------<  142<H>  4.8   |  32<H>  |  1.77<H>    Ca    8.5      14 Apr 2023 05:00      PT/INR - ( 14 Apr 2023 05:00 )   PT: 11.0 sec;   INR: 0.95 ratio         PTT - ( 14 Apr 2023 06:05 )  PTT:69.1 sec    amylase   lipase  RADIOLOGY & ADDITIONAL TESTS:

## 2023-04-14 NOTE — PROGRESS NOTE ADULT - ASSESSMENT
PREM CKD 3; Rt atrophic kidney: hypervolemic hypernatremia; hyperkalemia   HTN and diabetes; risk for hyperkalemic tendency  Anemia, GI bleed  Dyspnea: Asthma      Improving renal indices. To continue current meds. Monitor BP trend. Titrate BP meds as needed.   Monitor blood sugar levels. Insulin coverage as needed. Dietary restriction. Monitor h/h trend. Transfuse PRBC's PRN.   Avoid nephrotoxic meds as possible. Will follow electrolytes and renal function trend.

## 2023-04-14 NOTE — PROGRESS NOTE ADULT - SUBJECTIVE AND OBJECTIVE BOX
Patient is a 89y old  Female who presents with a chief complaint of Shortness of Breath (14 Apr 2023 07:26)    Patient seen in follow up for hyponatremia, PREM.        PAST MEDICAL HISTORY:  Moderate asthma    Hypertension      MEDICATIONS  (STANDING):  amLODIPine   Tablet 5 milliGRAM(s) Oral daily  atorvastatin 20 milliGRAM(s) Oral at bedtime  budesonide  80 MICROgram(s)/formoterol 4.5 MICROgram(s) Inhaler 2 Puff(s) Inhalation two times a day  cloNIDine 0.2 milliGRAM(s) Oral two times a day  dextrose 5%. 1000 milliLiter(s) (100 mL/Hr) IV Continuous <Continuous>  dextrose 5%. 1000 milliLiter(s) (50 mL/Hr) IV Continuous <Continuous>  dextrose 50% Injectable 25 Gram(s) IV Push once  dextrose 50% Injectable 12.5 Gram(s) IV Push once  dextrose 50% Injectable 25 Gram(s) IV Push once  ferrous    sulfate 325 milliGRAM(s) Oral daily  folic acid 1 milliGRAM(s) Oral daily  glucagon  Injectable 1 milliGRAM(s) IntraMuscular once  heparin  Infusion.  Unit(s)/Hr (6.5 mL/Hr) IV Continuous <Continuous>  insulin glargine Injectable (LANTUS) 5 Unit(s) SubCutaneous at bedtime  insulin lispro (ADMELOG) corrective regimen sliding scale   SubCutaneous three times a day before meals  insulin lispro (ADMELOG) corrective regimen sliding scale   SubCutaneous at bedtime  levothyroxine 25 MICROGram(s) Oral daily  pantoprazole   Suspension 40 milliGRAM(s) Oral daily  senna 2 Tablet(s) Oral at bedtime    MEDICATIONS  (PRN):  acetaminophen     Tablet .. 650 milliGRAM(s) Oral every 6 hours PRN Temp greater or equal to 38C (100.4F), Mild Pain (1 - 3)  albuterol    0.083% 2.5 milliGRAM(s) Nebulizer every 6 hours PRN Shortness of Breath and/or Wheezing  dextrose Oral Gel 15 Gram(s) Oral once PRN Blood Glucose LESS THAN 70 milliGRAM(s)/deciliter  heparin   Injectable 3200 Unit(s) IV Push every 6 hours PRN For aPTT less than 40  melatonin 3 milliGRAM(s) Oral at bedtime PRN Insomnia  ondansetron Injectable 4 milliGRAM(s) IV Push every 8 hours PRN Nausea and/or Vomiting  polyethylene glycol 3350 17 Gram(s) Oral daily PRN Constipation    T(C): 36.3 (04-14-23 @ 06:07), Max: 36.4 (04-13-23 @ 05:45)  HR: 100 (04-14-23 @ 06:07) (64 - 100)  BP: 167/96 (04-14-23 @ 06:07) (141/99 - 167/96)  RR: 16 (04-14-23 @ 06:07) (16 - 18)  SpO2: 98% (04-14-23 @ 10:38) (94% - 100%)  Wt(kg): --  I&O's Detail    13 Apr 2023 07:01  -  14 Apr 2023 07:00  --------------------------------------------------------  IN:    Heparin Infusion: 84.5 mL  Total IN: 84.5 mL    OUT:    Incontinent per Collection Bag (mL): 1275 mL  Total OUT: 1275 mL    Total NET: -1190.5 mL          PHYSICAL EXAM:  General: No distress  Respiratory: b/l air entry  Cardiovascular: S1 S2  Gastrointestinal: soft  Extremities:  edema                              8.9    9.82  )-----------( 247      ( 14 Apr 2023 05:00 )             27.2     04-14    134<L>  |  96  |  61<H>  ----------------------------<  142<H>  4.8   |  32<H>  |  1.77<H>    Ca    8.5      14 Apr 2023 05:00                Sodium, Serum: 134 (04-14 @ 05:00)  Sodium, Serum: 132 (04-13 @ 07:00)  Sodium, Serum: 129 (04-12 @ 06:09)  Sodium, Serum: 126 (04-11 @ 09:10)    Creatinine, Serum: 1.77 (04-14 @ 05:00)  Creatinine, Serum: 1.97 (04-13 @ 07:00)  Creatinine, Serum: 2.01 (04-12 @ 06:09)  Creatinine, Serum: 2.03 (04-11 @ 09:10)    Potassium, Serum: 4.8 (04-14 @ 05:00)  Potassium, Serum: 4.8 (04-13 @ 07:00)  Potassium, Serum: 5.6 (04-12 @ 06:09)  Potassium, Serum: 5.7 (04-11 @ 09:10)    Hemoglobin: 8.9 (04-14 @ 05:00)  Hemoglobin: 8.7 (04-13 @ 23:00)  Hemoglobin: 8.7 (04-13 @ 14:15)  Hemoglobin: 8.5 (04-13 @ 07:00)        < from: US Renal (04.12.23 @ 10:02) >    ACC: [id number] EXAM:  US KIDNEY(S)   ORDERED BY: HOLLY OVALLE     PROCEDURE DATE:  04/12/2023          INTERPRETATION:  CLINICAL INFORMATION: Acute kidney injury    COMPARISON: None available.    TECHNIQUE: Sonography of the kidneys and bladder.    FINDINGS:  Right kidney: 8.0 cm. Atrophic and increased echogenicity. No renal mass,   hydronephrosis or calculi.    Left kidney: 12.6 cm. No renal mass, hydronephrosis or calculi.    Urinary bladder: Within normal limits. Volume 190 mL.    Other: Bilateral pleural effusions.    IMPRESSION:  No hydronephrosis.        --- End of Report ---            SUSHIL OCONNELL MD; Attending Radiologist  This document has been electronically signed. Apr 12 2023 10:05AM    < end of copied text >         Patient is a 89y old  Female who presents with a chief complaint of Shortness of Breath (14 Apr 2023 07:26)    Patient seen in follow up for hyponatremia, PREM.        PAST MEDICAL HISTORY:  Moderate asthma    Hypertension      MEDICATIONS  (STANDING):  amLODIPine   Tablet 5 milliGRAM(s) Oral daily  atorvastatin 20 milliGRAM(s) Oral at bedtime  budesonide  80 MICROgram(s)/formoterol 4.5 MICROgram(s) Inhaler 2 Puff(s) Inhalation two times a day  cloNIDine 0.2 milliGRAM(s) Oral two times a day  dextrose 5%. 1000 milliLiter(s) (100 mL/Hr) IV Continuous <Continuous>  dextrose 5%. 1000 milliLiter(s) (50 mL/Hr) IV Continuous <Continuous>  dextrose 50% Injectable 25 Gram(s) IV Push once  dextrose 50% Injectable 12.5 Gram(s) IV Push once  dextrose 50% Injectable 25 Gram(s) IV Push once  ferrous    sulfate 325 milliGRAM(s) Oral daily  folic acid 1 milliGRAM(s) Oral daily  glucagon  Injectable 1 milliGRAM(s) IntraMuscular once  heparin  Infusion.  Unit(s)/Hr (6.5 mL/Hr) IV Continuous <Continuous>  insulin glargine Injectable (LANTUS) 5 Unit(s) SubCutaneous at bedtime  insulin lispro (ADMELOG) corrective regimen sliding scale   SubCutaneous three times a day before meals  insulin lispro (ADMELOG) corrective regimen sliding scale   SubCutaneous at bedtime  levothyroxine 25 MICROGram(s) Oral daily  pantoprazole   Suspension 40 milliGRAM(s) Oral daily  senna 2 Tablet(s) Oral at bedtime    MEDICATIONS  (PRN):  acetaminophen     Tablet .. 650 milliGRAM(s) Oral every 6 hours PRN Temp greater or equal to 38C (100.4F), Mild Pain (1 - 3)  albuterol    0.083% 2.5 milliGRAM(s) Nebulizer every 6 hours PRN Shortness of Breath and/or Wheezing  dextrose Oral Gel 15 Gram(s) Oral once PRN Blood Glucose LESS THAN 70 milliGRAM(s)/deciliter  heparin   Injectable 3200 Unit(s) IV Push every 6 hours PRN For aPTT less than 40  melatonin 3 milliGRAM(s) Oral at bedtime PRN Insomnia  ondansetron Injectable 4 milliGRAM(s) IV Push every 8 hours PRN Nausea and/or Vomiting  polyethylene glycol 3350 17 Gram(s) Oral daily PRN Constipation    T(C): 36.3 (04-14-23 @ 06:07), Max: 36.4 (04-13-23 @ 05:45)  HR: 100 (04-14-23 @ 06:07) (64 - 100)  BP: 167/96 (04-14-23 @ 06:07) (141/99 - 167/96)  RR: 16 (04-14-23 @ 06:07) (16 - 18)  SpO2: 98% (04-14-23 @ 10:38) (94% - 100%)  Wt(kg): --  I&O's Detail    13 Apr 2023 07:01  -  14 Apr 2023 07:00  --------------------------------------------------------  IN:    Heparin Infusion: 84.5 mL  Total IN: 84.5 mL    OUT:    Incontinent per Collection Bag (mL): 1275 mL  Total OUT: 1275 mL    Total NET: -1190.5 mL          PHYSICAL EXAM:  General: No distress  Respiratory: b/l air entry  Cardiovascular: S1 S2  Gastrointestinal: soft  Extremities:  edema                              8.9    9.82  )-----------( 247      ( 14 Apr 2023 05:00 )             27.2     04-14    134<L>  |  96  |  61<H>  ----------------------------<  142<H>  4.8   |  32<H>  |  1.77<H>    Ca    8.5      14 Apr 2023 05:00                Sodium, Serum: 134 (04-14 @ 05:00)  Sodium, Serum: 132 (04-13 @ 07:00)  Sodium, Serum: 129 (04-12 @ 06:09)  Sodium, Serum: 126 (04-11 @ 09:10)    Creatinine, Serum: 1.77 (04-14 @ 05:00)  Creatinine, Serum: 1.97 (04-13 @ 07:00)  Creatinine, Serum: 2.01 (04-12 @ 06:09)  Creatinine, Serum: 2.03 (04-11 @ 09:10)    Potassium, Serum: 4.8 (04-14 @ 05:00)  Potassium, Serum: 4.8 (04-13 @ 07:00)  Potassium, Serum: 5.6 (04-12 @ 06:09)  Potassium, Serum: 5.7 (04-11 @ 09:10)    Hemoglobin: 8.9 (04-14 @ 05:00)  Hemoglobin: 8.7 (04-13 @ 23:00)  Hemoglobin: 8.7 (04-13 @ 14:15)  Hemoglobin: 8.5 (04-13 @ 07:00)        < from: US Renal (04.12.23 @ 10:02) >    ACC: [id number] EXAM:  US KIDNEY(S)   ORDERED BY: HOLLY OVALLE     PROCEDURE DATE:  04/12/2023          INTERPRETATION:  CLINICAL INFORMATION: Acute kidney injury    COMPARISON: None available.    TECHNIQUE: Sonography of the kidneys and bladder.    FINDINGS:  Right kidney: 8.0 cm. Atrophic and increased echogenicity. No renal mass,   hydronephrosis or calculi.    Left kidney: 12.6 cm. No renal mass, hydronephrosis or calculi.    Urinary bladder: Within normal limits. Volume 190 mL.    Other: Bilateral pleural effusions.    IMPRESSION:  No hydronephrosis.        --- End of Report ---            SUSHIL OCONNELL MD; Attending Radiologist  This document has been electronically signed. Apr 12 2023 10:05AM    < end of copied text >         Patient is a 89y old  Female who presents with a chief complaint of Shortness of Breath (14 Apr 2023 07:26)    Patient seen in follow up for hyponatremia, PREM.        PAST MEDICAL HISTORY:  Moderate asthma    Hypertension      MEDICATIONS  (STANDING):  amLODIPine   Tablet 5 milliGRAM(s) Oral daily  atorvastatin 20 milliGRAM(s) Oral at bedtime  budesonide  80 MICROgram(s)/formoterol 4.5 MICROgram(s) Inhaler 2 Puff(s) Inhalation two times a day  cloNIDine 0.2 milliGRAM(s) Oral two times a day  dextrose 5%. 1000 milliLiter(s) (100 mL/Hr) IV Continuous <Continuous>  dextrose 5%. 1000 milliLiter(s) (50 mL/Hr) IV Continuous <Continuous>  dextrose 50% Injectable 25 Gram(s) IV Push once  dextrose 50% Injectable 12.5 Gram(s) IV Push once  dextrose 50% Injectable 25 Gram(s) IV Push once  ferrous    sulfate 325 milliGRAM(s) Oral daily  folic acid 1 milliGRAM(s) Oral daily  glucagon  Injectable 1 milliGRAM(s) IntraMuscular once  heparin  Infusion.  Unit(s)/Hr (6.5 mL/Hr) IV Continuous <Continuous>  insulin glargine Injectable (LANTUS) 5 Unit(s) SubCutaneous at bedtime  insulin lispro (ADMELOG) corrective regimen sliding scale   SubCutaneous three times a day before meals  insulin lispro (ADMELOG) corrective regimen sliding scale   SubCutaneous at bedtime  levothyroxine 25 MICROGram(s) Oral daily  pantoprazole   Suspension 40 milliGRAM(s) Oral daily  senna 2 Tablet(s) Oral at bedtime    MEDICATIONS  (PRN):  acetaminophen     Tablet .. 650 milliGRAM(s) Oral every 6 hours PRN Temp greater or equal to 38C (100.4F), Mild Pain (1 - 3)  albuterol    0.083% 2.5 milliGRAM(s) Nebulizer every 6 hours PRN Shortness of Breath and/or Wheezing  dextrose Oral Gel 15 Gram(s) Oral once PRN Blood Glucose LESS THAN 70 milliGRAM(s)/deciliter  heparin   Injectable 3200 Unit(s) IV Push every 6 hours PRN For aPTT less than 40  melatonin 3 milliGRAM(s) Oral at bedtime PRN Insomnia  ondansetron Injectable 4 milliGRAM(s) IV Push every 8 hours PRN Nausea and/or Vomiting  polyethylene glycol 3350 17 Gram(s) Oral daily PRN Constipation    T(C): 36.3 (04-14-23 @ 06:07), Max: 36.4 (04-13-23 @ 05:45)  HR: 100 (04-14-23 @ 06:07) (64 - 100)  BP: 167/96 (04-14-23 @ 06:07) (141/99 - 167/96)  RR: 16 (04-14-23 @ 06:07) (16 - 18)  SpO2: 98% (04-14-23 @ 10:38) (94% - 100%)  Wt(kg): --  I&O's Detail    13 Apr 2023 07:01  -  14 Apr 2023 07:00  --------------------------------------------------------  IN:    Heparin Infusion: 84.5 mL  Total IN: 84.5 mL    OUT:    Incontinent per Collection Bag (mL): 1275 mL  Total OUT: 1275 mL    Total NET: -1190.5 mL          PHYSICAL EXAM:  General: No distress  Respiratory: b/l air entry  Cardiovascular: S1 S2  Gastrointestinal: soft  Extremities:  edema                              8.9    9.82  )-----------( 247      ( 14 Apr 2023 05:00 )             27.2     04-14    134<L>  |  96  |  61<H>  ----------------------------<  142<H>  4.8   |  32<H>  |  1.77<H>    Ca    8.5      14 Apr 2023 05:00                Sodium, Serum: 134 (04-14 @ 05:00)  Sodium, Serum: 132 (04-13 @ 07:00)  Sodium, Serum: 129 (04-12 @ 06:09)  Sodium, Serum: 126 (04-11 @ 09:10)    Creatinine, Serum: 1.77 (04-14 @ 05:00)  Creatinine, Serum: 1.97 (04-13 @ 07:00)  Creatinine, Serum: 2.01 (04-12 @ 06:09)  Creatinine, Serum: 2.03 (04-11 @ 09:10)    Potassium, Serum: 4.8 (04-14 @ 05:00)  Potassium, Serum: 4.8 (04-13 @ 07:00)  Potassium, Serum: 5.6 (04-12 @ 06:09)  Potassium, Serum: 5.7 (04-11 @ 09:10)    Hemoglobin: 8.9 (04-14 @ 05:00)  Hemoglobin: 8.7 (04-13 @ 23:00)  Hemoglobin: 8.7 (04-13 @ 14:15)  Hemoglobin: 8.5 (04-13 @ 07:00)        < from: US Renal (04.12.23 @ 10:02) >    ACC: [id number] EXAM:  US KIDNEY(S)   ORDERED BY: HOLLY OVALLE     PROCEDURE DATE:  04/12/2023          INTERPRETATION:  CLINICAL INFORMATION: Acute kidney injury    COMPARISON: None available.    TECHNIQUE: Sonography of the kidneys and bladder.    FINDINGS:  Right kidney: 8.0 cm. Atrophic and increased echogenicity. No renal mass,   hydronephrosis or calculi.    Left kidney: 12.6 cm. No renal mass, hydronephrosis or calculi.    Urinary bladder: Within normal limits. Volume 190 mL.    Other: Bilateral pleural effusions.    IMPRESSION:  No hydronephrosis.        --- End of Report ---            SUSHIL OCONNELL MD; Attending Radiologist  This document has been electronically signed. Apr 12 2023 10:05AM    < end of copied text >

## 2023-04-15 ENCOUNTER — TRANSCRIPTION ENCOUNTER (OUTPATIENT)
Age: 88
End: 2023-04-15

## 2023-04-15 VITALS
SYSTOLIC BLOOD PRESSURE: 157 MMHG | TEMPERATURE: 97 F | RESPIRATION RATE: 17 BRPM | HEART RATE: 82 BPM | OXYGEN SATURATION: 99 % | DIASTOLIC BLOOD PRESSURE: 87 MMHG

## 2023-04-15 LAB
APTT BLD: 31.9 SEC — SIGNIFICANT CHANGE UP (ref 27.5–35.5)
GLUCOSE BLDC GLUCOMTR-MCNC: 111 MG/DL — HIGH (ref 70–99)
GLUCOSE BLDC GLUCOMTR-MCNC: 229 MG/DL — HIGH (ref 70–99)
GLUCOSE BLDC GLUCOMTR-MCNC: 53 MG/DL — CRITICAL LOW (ref 70–99)
GLUCOSE BLDC GLUCOMTR-MCNC: 88 MG/DL — SIGNIFICANT CHANGE UP (ref 70–99)
HCT VFR BLD CALC: 26.7 % — LOW (ref 34.5–45)
HGB BLD-MCNC: 8.6 G/DL — LOW (ref 11.5–15.5)
MCHC RBC-ENTMCNC: 21.4 PG — LOW (ref 27–34)
MCHC RBC-ENTMCNC: 32.2 GM/DL — SIGNIFICANT CHANGE UP (ref 32–36)
MCV RBC AUTO: 66.6 FL — LOW (ref 80–100)
NRBC # BLD: 0 /100 WBCS — SIGNIFICANT CHANGE UP (ref 0–0)
PLATELET # BLD AUTO: 274 K/UL — SIGNIFICANT CHANGE UP (ref 150–400)
RBC # BLD: 4.01 M/UL — SIGNIFICANT CHANGE UP (ref 3.8–5.2)
RBC # FLD: 19.4 % — HIGH (ref 10.3–14.5)
WBC # BLD: 8.6 K/UL — SIGNIFICANT CHANGE UP (ref 3.8–10.5)
WBC # FLD AUTO: 8.6 K/UL — SIGNIFICANT CHANGE UP (ref 3.8–10.5)

## 2023-04-15 PROCEDURE — 96374 THER/PROPH/DIAG INJ IV PUSH: CPT

## 2023-04-15 PROCEDURE — 94760 N-INVAS EAR/PLS OXIMETRY 1: CPT

## 2023-04-15 PROCEDURE — 99285 EMERGENCY DEPT VISIT HI MDM: CPT | Mod: 25

## 2023-04-15 PROCEDURE — 36415 COLL VENOUS BLD VENIPUNCTURE: CPT

## 2023-04-15 PROCEDURE — 92610 EVALUATE SWALLOWING FUNCTION: CPT

## 2023-04-15 PROCEDURE — 87635 SARS-COV-2 COVID-19 AMP PRB: CPT

## 2023-04-15 PROCEDURE — 93970 EXTREMITY STUDY: CPT

## 2023-04-15 PROCEDURE — 36600 WITHDRAWAL OF ARTERIAL BLOOD: CPT

## 2023-04-15 PROCEDURE — 83880 ASSAY OF NATRIURETIC PEPTIDE: CPT

## 2023-04-15 PROCEDURE — 83550 IRON BINDING TEST: CPT

## 2023-04-15 PROCEDURE — 71045 X-RAY EXAM CHEST 1 VIEW: CPT

## 2023-04-15 PROCEDURE — 82272 OCCULT BLD FECES 1-3 TESTS: CPT

## 2023-04-15 PROCEDURE — 82607 VITAMIN B-12: CPT

## 2023-04-15 PROCEDURE — 85379 FIBRIN DEGRADATION QUANT: CPT

## 2023-04-15 PROCEDURE — 82746 ASSAY OF FOLIC ACID SERUM: CPT

## 2023-04-15 PROCEDURE — 86901 BLOOD TYPING SEROLOGIC RH(D): CPT

## 2023-04-15 PROCEDURE — 85025 COMPLETE CBC W/AUTO DIFF WBC: CPT

## 2023-04-15 PROCEDURE — 78580 LUNG PERFUSION IMAGING: CPT

## 2023-04-15 PROCEDURE — 87086 URINE CULTURE/COLONY COUNT: CPT

## 2023-04-15 PROCEDURE — 86900 BLOOD TYPING SEROLOGIC ABO: CPT

## 2023-04-15 PROCEDURE — 76775 US EXAM ABDO BACK WALL LIM: CPT

## 2023-04-15 PROCEDURE — 83605 ASSAY OF LACTIC ACID: CPT

## 2023-04-15 PROCEDURE — 84484 ASSAY OF TROPONIN QUANT: CPT

## 2023-04-15 PROCEDURE — 82803 BLOOD GASES ANY COMBINATION: CPT

## 2023-04-15 PROCEDURE — 80053 COMPREHEN METABOLIC PANEL: CPT

## 2023-04-15 PROCEDURE — 99233 SBSQ HOSP IP/OBS HIGH 50: CPT

## 2023-04-15 PROCEDURE — 94660 CPAP INITIATION&MGMT: CPT

## 2023-04-15 PROCEDURE — 84443 ASSAY THYROID STIM HORMONE: CPT

## 2023-04-15 PROCEDURE — 82728 ASSAY OF FERRITIN: CPT

## 2023-04-15 PROCEDURE — 80048 BASIC METABOLIC PNL TOTAL CA: CPT

## 2023-04-15 PROCEDURE — 85730 THROMBOPLASTIN TIME PARTIAL: CPT

## 2023-04-15 PROCEDURE — 81001 URINALYSIS AUTO W/SCOPE: CPT

## 2023-04-15 PROCEDURE — 93306 TTE W/DOPPLER COMPLETE: CPT

## 2023-04-15 PROCEDURE — 86923 COMPATIBILITY TEST ELECTRIC: CPT

## 2023-04-15 PROCEDURE — 83735 ASSAY OF MAGNESIUM: CPT

## 2023-04-15 PROCEDURE — 85610 PROTHROMBIN TIME: CPT

## 2023-04-15 PROCEDURE — 93005 ELECTROCARDIOGRAM TRACING: CPT

## 2023-04-15 PROCEDURE — 36430 TRANSFUSION BLD/BLD COMPNT: CPT

## 2023-04-15 PROCEDURE — 83540 ASSAY OF IRON: CPT

## 2023-04-15 PROCEDURE — 96375 TX/PRO/DX INJ NEW DRUG ADDON: CPT

## 2023-04-15 PROCEDURE — 71250 CT THORAX DX C-: CPT

## 2023-04-15 PROCEDURE — 83036 HEMOGLOBIN GLYCOSYLATED A1C: CPT

## 2023-04-15 PROCEDURE — 92526 ORAL FUNCTION THERAPY: CPT

## 2023-04-15 PROCEDURE — 87637 SARSCOV2&INF A&B&RSV AMP PRB: CPT

## 2023-04-15 PROCEDURE — A9540: CPT

## 2023-04-15 PROCEDURE — 82962 GLUCOSE BLOOD TEST: CPT

## 2023-04-15 PROCEDURE — 84100 ASSAY OF PHOSPHORUS: CPT

## 2023-04-15 PROCEDURE — 87040 BLOOD CULTURE FOR BACTERIA: CPT

## 2023-04-15 PROCEDURE — 94640 AIRWAY INHALATION TREATMENT: CPT

## 2023-04-15 PROCEDURE — 99239 HOSP IP/OBS DSCHRG MGMT >30: CPT

## 2023-04-15 PROCEDURE — 86850 RBC ANTIBODY SCREEN: CPT

## 2023-04-15 PROCEDURE — P9040: CPT

## 2023-04-15 PROCEDURE — 97162 PT EVAL MOD COMPLEX 30 MIN: CPT

## 2023-04-15 PROCEDURE — 85027 COMPLETE CBC AUTOMATED: CPT

## 2023-04-15 RX ORDER — PANTOPRAZOLE SODIUM 20 MG/1
0 TABLET, DELAYED RELEASE ORAL
Qty: 0 | Refills: 0 | DISCHARGE
Start: 2023-04-15

## 2023-04-15 RX ORDER — POLYETHYLENE GLYCOL 3350 17 G/17G
17 POWDER, FOR SOLUTION ORAL
Qty: 0 | Refills: 0 | DISCHARGE
Start: 2023-04-15

## 2023-04-15 RX ORDER — SENNA PLUS 8.6 MG/1
2 TABLET ORAL
Qty: 0 | Refills: 0 | DISCHARGE
Start: 2023-04-15

## 2023-04-15 RX ORDER — FOLIC ACID 0.8 MG
1 TABLET ORAL
Qty: 0 | Refills: 0 | DISCHARGE
Start: 2023-04-15

## 2023-04-15 RX ORDER — FERROUS SULFATE 325(65) MG
1 TABLET ORAL
Qty: 0 | Refills: 0 | DISCHARGE
Start: 2023-04-15

## 2023-04-15 RX ORDER — ACETAMINOPHEN 500 MG
2 TABLET ORAL
Qty: 0 | Refills: 0 | DISCHARGE
Start: 2023-04-15

## 2023-04-15 RX ORDER — APIXABAN 2.5 MG/1
1 TABLET, FILM COATED ORAL
Qty: 0 | Refills: 0 | DISCHARGE
Start: 2023-04-15

## 2023-04-15 RX ADMIN — BUDESONIDE AND FORMOTEROL FUMARATE DIHYDRATE 2 PUFF(S): 160; 4.5 AEROSOL RESPIRATORY (INHALATION) at 14:05

## 2023-04-15 RX ADMIN — Medication 25 MICROGRAM(S): at 05:55

## 2023-04-15 RX ADMIN — Medication 325 MILLIGRAM(S): at 12:11

## 2023-04-15 RX ADMIN — Medication 0.2 MILLIGRAM(S): at 06:18

## 2023-04-15 RX ADMIN — AMLODIPINE BESYLATE 5 MILLIGRAM(S): 2.5 TABLET ORAL at 06:18

## 2023-04-15 RX ADMIN — PANTOPRAZOLE SODIUM 40 MILLIGRAM(S): 20 TABLET, DELAYED RELEASE ORAL at 12:11

## 2023-04-15 RX ADMIN — Medication 1 MILLIGRAM(S): at 12:11

## 2023-04-15 RX ADMIN — Medication 4: at 12:12

## 2023-04-15 RX ADMIN — APIXABAN 2.5 MILLIGRAM(S): 2.5 TABLET, FILM COATED ORAL at 06:18

## 2023-04-15 RX ADMIN — POLYETHYLENE GLYCOL 3350 17 GRAM(S): 17 POWDER, FOR SOLUTION ORAL at 12:11

## 2023-04-15 NOTE — DISCHARGE NOTE NURSING/CASE MANAGEMENT/SOCIAL WORK - PATIENT PORTAL LINK FT
You can access the FollowMyHealth Patient Portal offered by [facility] by registering at the following website: [email or web address]. By joining [employer]’s FollowMyHealth portal, you will also be able to view your health information using other applications (apps) compatible with our system. You can access the FollowMyHealth Patient Portal offered by [facility] by registering at the following website: [email or web address]. By joining [employer]’s FollowMyHealth portal, you will also be able to view your health information using other applications (apps) compatible with our system. You can access the FollowMyHealth Patient Portal offered by [facility] by registering at the following website: [email or web address]. By joining [employer]’s FollowMyHealth portal, you will also be able to view your health information using other applications (apps) compatible with our system.

## 2023-04-15 NOTE — PROVIDER CONTACT NOTE (HYPOGLYCEMIA EVENT) - NS PROVIDER CONTACT BACKGROUND-HYPO
Age: 89y    Gender: Female    POCT Blood Glucose:  111 mg/dL (04-15-23 @ 08:53)  88 mg/dL (04-15-23 @ 08:28)  53 mg/dL (04-15-23 @ 08:04)  53 mg/dL (04-15-23 @ 08:03)  177 mg/dL (04-14-23 @ 21:21)  158 mg/dL (04-14-23 @ 17:25)  343 mg/dL (04-14-23 @ 11:47)      eMAR:atorvastatin   20 milliGRAM(s) Oral (04-14-23 @ 21:19)    insulin glargine Injectable (LANTUS)   5 Unit(s) SubCutaneous (04-14-23 @ 21:34)    insulin lispro (ADMELOG) corrective regimen sliding scale   2 Unit(s) SubCutaneous (04-14-23 @ 17:26)   8 Unit(s) SubCutaneous (04-14-23 @ 11:52)    levothyroxine   25 MICROGram(s) Oral (04-15-23 @ 05:55)

## 2023-04-15 NOTE — PROGRESS NOTE ADULT - NS ATTEND AMEND GEN_ALL_CORE FT
Agree with the assessment and plan of SANDRA Johnson.  Plan for outpatient GI evaluation for anemia.  Continue bowel regimen.

## 2023-04-15 NOTE — DISCHARGE NOTE NURSING/CASE MANAGEMENT/SOCIAL WORK - CAREGIVER ADDRESS
[address] [facility] [id number] [address] [facility] [id number] [address] [facility] [id number]

## 2023-04-15 NOTE — PROGRESS NOTE ADULT - PROBLEM SELECTOR PLAN 1
H&H stable S/P 1 Unit PRBC on admission   Keep Active T&C  Monitor CBC  Transfuse if Symptomatic or Hb less than 7   Monitor stool  Consider outpatient EGD/Colonoscopy, outpatient GI follow up  Restart anticoagulation with close monitoring  Continue PPI while on anticoagulation
H&H stable S/P 1 Unite PRBC   Keep Active T&C  Monitor CBC  Transfuse if Symptomatic or Hb less than 7   Monitor Stool color  Will discuss EGD/colonoscopy either in patient or out patient.
H&H stable S/P 1 Unite PRBC on admission   Keep Active T&C  Monitor CBC  Transfuse if Symptomatic or Hb less than 7   Monitor stool  Consider outpatient EGD/Colonoscopy  Restart anticoagulation with close monitoring  Continue PPI while on anticoagulation
H&H stable S/P 1 Unite PRBC   Keep Active T&C  Monitor CBC  Transfuse if Symptomatic or Hb less than 7   Monitor Stool color  Will discuss EGD/colonoscopy either in patient or out patient.
H&H stable S/P 1 Unite PRBC   Keep Active T&C  Monitor CBC  Transfuse if Symptomatic or Hb less than 7   Monitor Stool color  Consider outpatient EGD/Colonoscopy  Restart anticoagulation with close monitoring

## 2023-04-15 NOTE — PROGRESS NOTE ADULT - ASSESSMENT
89F with PMH HTN, asthma, Type 2 DM, hypothyroidism, recent hospitalization in [place] for "trouble breathing", returned from [place] 5 days prior to admission, comes to the ED with SOB. Admitted for asthma exacerbation secondary to RSV.    #Asthma exacerbation  #RSV infection  -CT chest non-contrast shows trace bilateral pleural effusions   -Completed steroid taper  -Continue Albuterol PRN, Symbicort  -Spo2 appropriate on RA    #Acute on chronic blood loss anemia   -s/p 1 unit pRBC with appropriate increase, per PCP hemoglobin ~ 9.0   -Drop in H/H likely from AC  -GI consulted, recommendations appreciated. Consider outpatient EGD/colonoscopy  -Off heparin gtt, on Eliquis H/H stable  -Low iron and folate, Continue supplements  -Continue Protonix while on AC    #New onset A-fib  -Was started on Eliquis, but with worsening anemia, FOBT +, now holding Eliquis   -Continue heparin gtt for AC - plan to transition to Eliquis if H/H remains stable  -ASA stopped for now, no hx of CAD or stroke, risk of bleeding on ASA + Eliquis > benefits  -Echo: LVEF 55-60%, moderate to severe aortic stenosis, grade 3 diastolic dysfunction, severe pulm htn  -troponin negative   -Cardio consulted, recommendations appreciated  -d-dimer positive, V/Q low probability for PE    #PREM on CKD Stage 4  Per chart review Cr baseline ~1.8  Cr 1.64 on 4/7/23  -Renal sono with no hydro  -Gentle PO hydration encouraged  -Avoid nephrotoxic medications  -Follow up AM BMP    #Essential HTN  -Continue Norvasc, Clonidine  -Monitor vitals    #Hypothyroidism  -Continue Synthroid  -TSH reviewed wnl    #HLD  -Continue Lipitor    #DM Type 2  A1C 7.2  -hold oral meds (Metformin, Glimepiride)  -Stop Lantus (hypoglycemic this AM)  -Continue moderate dose insulin sliding scale  -Hypoglycemia protocol, accu-checks  -Blood glucose goal 100-180 in hospital setting    #Prophylactic Measure  -DVT ppx: Eliquis  -GI ppx: Protonix  -Bowel regimen - constipation resolving   -PT eval - [facility]. Family interested in [facility]      Discussed above with PMD, Dr. Merchant, [phone], aware and in agreement with above. DC faxed to her office [phone]    Left message for daughter in law Anna  awaiting call back    Code Status: DNR     Dispo: DC to [facility] today.   89F with PMH HTN, asthma, Type 2 DM, hypothyroidism, recent hospitalization in [place] for "trouble breathing", returned from [place] 5 days prior to admission, comes to the ED with SOB. Admitted for asthma exacerbation secondary to RSV.    #Asthma exacerbation  #RSV infection  -CT chest non-contrast shows trace bilateral pleural effusions   -Completed steroid taper  -Continue Albuterol PRN, Symbicort  -Spo2 appropriate on RA    #Acute on chronic blood loss anemia   -s/p 1 unit pRBC with appropriate increase, per PCP hemoglobin ~ 9.0   -Drop in H/H likely from AC  -GI consulted, recommendations appreciated. Consider outpatient EGD/colonoscopy  -Off heparin gtt, on Eliquis H/H stable  -Low iron and folate, Continue supplements  -Continue Protonix while on AC    #New onset A-fib  -Was started on Eliquis, but with worsening anemia, FOBT +, now holding Eliquis   -Continue heparin gtt for AC - plan to transition to Eliquis if H/H remains stable  -ASA stopped for now, no hx of CAD or stroke, risk of bleeding on ASA + Eliquis > benefits  -Echo: LVEF 55-60%, moderate to severe aortic stenosis, grade 3 diastolic dysfunction, severe pulm htn  -troponin negative   -Cardio consulted, recommendations appreciated  -d-dimer positive, V/Q low probability for PE    #PREM on CKD Stage 4  Per chart review Cr baseline ~1.8  Cr 1.64 on 4/7/23  -Renal sono with no hydro  -Gentle PO hydration encouraged  -Avoid nephrotoxic medications  -Follow up AM BMP    #Essential HTN  -Continue Norvasc, Clonidine  -Monitor vitals    #Hypothyroidism  -Continue Synthroid  -TSH reviewed wnl    #HLD  -Continue Lipitor    #DM Type 2  A1C 7.2  -hold oral meds (Metformin, Glimepiride)  -Stop Lantus (hypoglycemic this AM)  -Continue moderate dose insulin sliding scale  -Hypoglycemia protocol, accu-checks  -Blood glucose goal 100-180 in hospital setting    #Prophylactic Measure  -DVT ppx: Eliquis  -GI ppx: Protonix  -Bowel regimen - constipation resolving   -PT eval - [facility]. Family interested in [facility]      Discussed above with PMD, Dr. Merchant, [phone], aware and in agreement with above. DC faxed to her office [phone]    Left message for daughter in law Anna  awaiting call back    Code Status: DNR     Dispo: DC to [facility] today.   89F with PMH HTN, asthma, Type 2 DM, hypothyroidism, recent hospitalization in [place] for "trouble breathing", returned from [place] 5 days prior to admission, comes to the ED with SOB. Admitted for asthma exacerbation secondary to RSV.    #Asthma exacerbation  #RSV infection  -CT chest non-contrast shows trace bilateral pleural effusions   -Completed steroid taper  -Continue Albuterol PRN, Symbicort  -Spo2 appropriate on RA    #Acute on chronic blood loss anemia   -s/p 1 unit pRBC with appropriate increase, per PCP hemoglobin ~ 9.0   -Drop in H/H likely from AC  -GI consulted, recommendations appreciated. Consider outpatient EGD/colonoscopy  -Off heparin gtt, on Eliquis H/H stable  -Low iron and folate, Continue supplements  -Continue Protonix while on AC    #New onset A-fib  -Was started on Eliquis, but with worsening anemia, FOBT +, now holding Eliquis   -Continue heparin gtt for AC - plan to transition to Eliquis if H/H remains stable  -ASA stopped for now, no hx of CAD or stroke, risk of bleeding on ASA + Eliquis > benefits  -Echo: LVEF 55-60%, moderate to severe aortic stenosis, grade 3 diastolic dysfunction, severe pulm htn  -troponin negative   -Cardio consulted, recommendations appreciated  -d-dimer positive, V/Q low probability for PE    #PREM on CKD Stage 4  Per chart review Cr baseline ~1.8  Cr 1.64 on 4/7/23  -Renal sono with no hydro  -Gentle PO hydration encouraged  -Avoid nephrotoxic medications  -Follow up AM BMP    #Essential HTN  -Continue Norvasc, Clonidine  -Monitor vitals    #Hypothyroidism  -Continue Synthroid  -TSH reviewed wnl    #HLD  -Continue Lipitor    #DM Type 2  A1C 7.2  -hold oral meds (Metformin, Glimepiride)  -Stop Lantus (hypoglycemic this AM)  -Continue moderate dose insulin sliding scale  -Hypoglycemia protocol, accu-checks  -Blood glucose goal 100-180 in hospital setting    #Prophylactic Measure  -DVT ppx: Eliquis  -GI ppx: Protonix  -Bowel regimen - constipation resolving   -PT eval - [facility]. Family interested in [facility]      Discussed above with PMD, Dr. Merchant, [phone], aware and in agreement with above. DC faxed to her office [phone]    Left message for daughter in law Anna  awaiting call back    Code Status: DNR     Dispo: [facility] to [facility] today.   89F with PMH HTN, asthma, Type 2 DM, hypothyroidism, recent hospitalization in [place] for "trouble breathing", returned from [place] 5 days prior to admission, comes to the ED with SOB. Admitted for asthma exacerbation secondary to RSV.    #Asthma exacerbation  #RSV infection  -CT chest non-contrast shows trace bilateral pleural effusions   -Completed steroid taper  -Continue Albuterol PRN, Symbicort  -Spo2 appropriate on RA    #Acute on chronic blood loss anemia   -s/p 1 unit pRBC with appropriate increase, per PCP hemoglobin ~ 9.0   -Drop in H/H likely from AC  -GI consulted, recommendations appreciated. Consider outpatient EGD/colonoscopy  -Off heparin gtt, on Eliquis H/H stable  -Low iron and folate, Continue supplements  -Continue Protonix while on AC    #New onset A-fib  -Was started on Eliquis, but with worsening anemia, FOBT +, now holding Eliquis   -Continue heparin gtt for AC - plan to transition to Eliquis if H/H remains stable  -ASA stopped for now, no hx of CAD or stroke, risk of bleeding on ASA + Eliquis > benefits  -Echo: LVEF 55-60%, moderate to severe aortic stenosis, grade 3 diastolic dysfunction, severe pulm htn  -troponin negative   -Cardio consulted, recommendations appreciated  -d-dimer positive, V/Q low probability for PE    #PREM on CKD Stage 4  Per chart review Cr baseline ~1.8  Cr 1.64 on 4/7/23  -Renal sono with no hydro  -Gentle PO hydration encouraged  -Avoid nephrotoxic medications  -Follow up AM BMP    #Essential HTN  -Continue Norvasc, Clonidine  -Monitor vitals    #Hypothyroidism  -Continue Synthroid  -TSH reviewed wnl    #HLD  -Continue Lipitor    #DM Type 2  A1C 7.2  -hold oral meds (Metformin, Glimepiride)  -Stop Lantus (hypoglycemic this AM)  -Continue moderate dose insulin sliding scale  -Hypoglycemia protocol, accu-checks  -Blood glucose goal 100-180 in hospital setting    #Prophylactic Measure  -DVT ppx: Eliquis  -GI ppx: Protonix  -Bowel regimen - constipation resolving   -PT eval - [facility]. Family interested in [facility]      Discussed above with PMD, Dr. Merchant, [phone], aware and in agreement with above. DC faxed to her office [phone]    Discussed with daughter in law Castillo  aware and in agreement with above.     Code Status: DNR     Dispo: [facility] to [facility] today.   89F with PMH HTN, asthma, Type 2 DM, hypothyroidism, recent hospitalization in [place] for "trouble breathing", returned from [place] 5 days prior to admission, comes to the ED with SOB. Admitted for asthma exacerbation secondary to RSV.    #Asthma exacerbation  #RSV infection  -CT chest non-contrast shows trace bilateral pleural effusions   -Completed steroid taper  -Continue Albuterol PRN, Symbicort  -Spo2 appropriate on RA    #Acute on chronic blood loss anemia   -s/p 1 unit pRBC with appropriate increase, per PCP hemoglobin ~ 9.0   -Drop in H/H likely from AC  -GI consulted, recommendations appreciated. Consider outpatient EGD/colonoscopy  -Off heparin gtt, on Eliquis H/H stable  -Low iron and folate, Continue supplements  -Continue Protonix while on AC    #New onset A-fib  -Was started on Eliquis, but with worsening anemia, FOBT +, now holding Eliquis   -Continue heparin gtt for AC - plan to transition to Eliquis if H/H remains stable  -ASA stopped for now, no hx of CAD or stroke, risk of bleeding on ASA + Eliquis > benefits  -Echo: LVEF 55-60%, moderate to severe aortic stenosis, grade 3 diastolic dysfunction, severe pulm htn  -troponin negative   -Cardio consulted, recommendations appreciated  -d-dimer positive, V/Q low probability for PE    #PREM on CKD Stage 4  Per chart review Cr baseline ~1.8  Cr 1.64 on 4/7/23  -Renal sono with no hydro  -Gentle PO hydration encouraged  -Avoid nephrotoxic medications  -Follow up AM BMP    #Essential HTN  -Continue Norvasc, Clonidine  -Monitor vitals    #Hypothyroidism  -Continue Synthroid  -TSH reviewed wnl    #HLD  -Continue Lipitor    #DM Type 2  A1C 7.2  -hold oral meds (Metformin, Glimepiride)  -Stop Lantus (hypoglycemic this AM)  -Continue moderate dose insulin sliding scale  -Hypoglycemia protocol, accu-checks  -Blood glucose goal 100-180 in hospital setting    #Prophylactic Measure  -DVT ppx: Eliquis  -GI ppx: Protonix  -Bowel regimen - constipation resolving   -PT eval - [facility]. Family interested in [facility]      Discussed above with PMD, Dr. Merchant, [phone], aware and in agreement with above. DC faxed to her office [phone]    Discussed with daughter in law Castillo  aware and in agreement with above.     Code Status: DNR     Dispo: [facility] to [facility] today.   89F with PMH HTN, asthma, Type 2 DM, hypothyroidism, recent hospitalization in [place] for "trouble breathing", returned from [place] 5 days prior to admission, comes to the ED with SOB. Admitted for asthma exacerbation secondary to RSV.    #Asthma exacerbation  #RSV infection  -CT chest non-contrast shows trace bilateral pleural effusions   -Completed steroid taper  -Continue Albuterol PRN, Symbicort  -Spo2 appropriate on RA    #Acute on chronic blood loss anemia   -s/p 1 unit pRBC with appropriate increase, per PCP hemoglobin ~ 9.0   -Drop in H/H likely from AC  -GI consulted, recommendations appreciated. Consider outpatient EGD/colonoscopy  -Off heparin gtt, on Eliquis H/H stable  -Low iron and folate, Continue supplements  -Continue Protonix while on AC    #New onset A-fib  -Was started on Eliquis, but with worsening anemia, FOBT +, now holding Eliquis   -Continue heparin gtt for AC - plan to transition to Eliquis if H/H remains stable  -ASA stopped for now, no hx of CAD or stroke, risk of bleeding on ASA + Eliquis > benefits  -Echo: LVEF 55-60%, moderate to severe aortic stenosis, grade 3 diastolic dysfunction, severe pulm htn  -troponin negative   -Cardio consulted, recommendations appreciated  -d-dimer positive, V/Q low probability for PE    #PREM on CKD Stage 4  Per chart review Cr baseline ~1.8  Cr 1.64 on 4/7/23  -Renal sono with no hydro  -Gentle PO hydration encouraged  -Avoid nephrotoxic medications  -Follow up AM BMP    #Essential HTN  -Continue Norvasc, Clonidine  -Monitor vitals    #Hypothyroidism  -Continue Synthroid  -TSH reviewed wnl    #HLD  -Continue Lipitor    #DM Type 2  A1C 7.2  -hold oral meds (Metformin, Glimepiride)  -Stop Lantus (hypoglycemic this AM)  -Continue moderate dose insulin sliding scale  -Hypoglycemia protocol, accu-checks  -Blood glucose goal 100-180 in hospital setting    #Prophylactic Measure  -DVT ppx: Eliquis  -GI ppx: Protonix  -Bowel regimen - constipation resolving   -PT eval - [facility]. Family interested in [facility]      Discussed above with PMD, Dr. Merchant, [phone], aware and in agreement with above. DC faxed to her office [phone]    Discussed with daughter in law Castillo  aware and in agreement with above.     Code Status: DNR     Dispo: DC to [facility] today.

## 2023-04-15 NOTE — PROGRESS NOTE ADULT - NS ATTEND OPT1 GEN_ALL_CORE
I independently performed the documented:
I attest my time as attending is greater than 50% of the total combined time spent on qualifying patient care activities by the PA/NP and attending.
I independently performed the documented:
I attest my time as attending is greater than 50% of the total combined time spent on qualifying patient care activities by the PA/NP and attending.

## 2023-04-15 NOTE — PROGRESS NOTE ADULT - PROBLEM SELECTOR PLAN 2
Keep patient well hydrated  T & P in bed   Continue bowel regimen  Miralax daily.  Senna every night
Keep patient well hydrated  Continue bowel regimen  Miralax daily.  Senna
Keep patient well hydrated  T & P in bed   Continue bowel regimen  Miralax daily.  Senna
(2) very limited

## 2023-04-15 NOTE — PROGRESS NOTE ADULT - SUBJECTIVE AND OBJECTIVE BOX
MEDICATIONS  (STANDING):  amLODIPine   Tablet 5 milliGRAM(s) Oral daily  apixaban 2.5 milliGRAM(s) Oral two times a day  atorvastatin 20 milliGRAM(s) Oral at bedtime  budesonide  80 MICROgram(s)/formoterol 4.5 MICROgram(s) Inhaler 2 Puff(s) Inhalation two times a day  cloNIDine 0.2 milliGRAM(s) Oral two times a day  dextrose 5%. 1000 milliLiter(s) (100 mL/Hr) IV Continuous <Continuous>  dextrose 5%. 1000 milliLiter(s) (50 mL/Hr) IV Continuous <Continuous>  dextrose 50% Injectable 25 Gram(s) IV Push once  dextrose 50% Injectable 12.5 Gram(s) IV Push once  dextrose 50% Injectable 25 Gram(s) IV Push once  ferrous    sulfate 325 milliGRAM(s) Oral daily  folic acid 1 milliGRAM(s) Oral daily  glucagon  Injectable 1 milliGRAM(s) IntraMuscular once  insulin lispro (ADMELOG) corrective regimen sliding scale   SubCutaneous three times a day before meals  insulin lispro (ADMELOG) corrective regimen sliding scale   SubCutaneous at bedtime  levothyroxine 25 MICROGram(s) Oral daily  pantoprazole   Suspension 40 milliGRAM(s) Oral daily  senna 2 Tablet(s) Oral at bedtime    MEDICATIONS  (PRN):  acetaminophen     Tablet .. 650 milliGRAM(s) Oral every 6 hours PRN Temp greater or equal to 38C (100.4F), Mild Pain (1 - 3)  albuterol    0.083% 2.5 milliGRAM(s) Nebulizer every 6 hours PRN Shortness of Breath and/or Wheezing  dextrose Oral Gel 15 Gram(s) Oral once PRN Blood Glucose LESS THAN 70 milliGRAM(s)/deciliter  melatonin 3 milliGRAM(s) Oral at bedtime PRN Insomnia  ondansetron Injectable 4 milliGRAM(s) IV Push every 8 hours PRN Nausea and/or Vomiting  polyethylene glycol 3350 17 Gram(s) Oral daily PRN Constipation      PAST MEDICAL & SURGICAL HISTORY:  Moderate asthma  Hypertension    Vital Signs Last 24 Hrs  T(C): 36 (15 Apr 2023 05:51), Max: 36.7 (14 Apr 2023 19:52)  T(F): 96.8 (15 Apr 2023 05:51), Max: 98 (14 Apr 2023 19:52)  HR: 82 (15 Apr 2023 05:51) (77 - 86)  BP: 157/87 (15 Apr 2023 05:51) (133/73 - 157/87)  RR: 17 (15 Apr 2023 05:51) (17 - 17)  SpO2: 99% (15 Apr 2023 05:51) (97% - 100%)    Parameters below as of 15 Apr 2023 05:51  Patient On (Oxygen Delivery Method): room air        I&O's Summary    14 Apr 2023 07:01  -  15 Apr 2023 07:00  --------------------------------------------------------  IN: 0 mL / OUT: 1350 mL / NET: -1350 mL                              8.6    8.60  )-----------( 274      ( 15 Apr 2023 05:00 )             26.7     04-14    134<L>  |  96  |  61<H>  ----------------------------<  142<H>  4.8   |  32<H>  |  1.77<H>    Ca    8.5      14 Apr 2023 05:00

## 2023-04-15 NOTE — PROGRESS NOTE ADULT - REASON FOR ADMISSION
Shortness of Breath

## 2023-04-15 NOTE — PROGRESS NOTE ADULT - SUBJECTIVE AND OBJECTIVE BOX
Patient is a 89y old  Female who presents with a chief complaint of Shortness of Breath (14 Apr 2023 11:57)      INTERVAL HPI/OVERNIGHT EVENTS: Patient seen and examined at bedside. No overnight events.    MEDICATIONS  (STANDING):  amLODIPine   Tablet 5 milliGRAM(s) Oral daily  apixaban 2.5 milliGRAM(s) Oral two times a day  atorvastatin 20 milliGRAM(s) Oral at bedtime  budesonide  80 MICROgram(s)/formoterol 4.5 MICROgram(s) Inhaler 2 Puff(s) Inhalation two times a day  cloNIDine 0.2 milliGRAM(s) Oral two times a day  dextrose 5%. 1000 milliLiter(s) (50 mL/Hr) IV Continuous <Continuous>  dextrose 5%. 1000 milliLiter(s) (100 mL/Hr) IV Continuous <Continuous>  dextrose 50% Injectable 25 Gram(s) IV Push once  dextrose 50% Injectable 12.5 Gram(s) IV Push once  dextrose 50% Injectable 25 Gram(s) IV Push once  ferrous    sulfate 325 milliGRAM(s) Oral daily  folic acid 1 milliGRAM(s) Oral daily  glucagon  Injectable 1 milliGRAM(s) IntraMuscular once  insulin glargine Injectable (LANTUS) 5 Unit(s) SubCutaneous at bedtime  insulin lispro (ADMELOG) corrective regimen sliding scale   SubCutaneous three times a day before meals  insulin lispro (ADMELOG) corrective regimen sliding scale   SubCutaneous at bedtime  levothyroxine 25 MICROGram(s) Oral daily  pantoprazole   Suspension 40 milliGRAM(s) Oral daily  senna 2 Tablet(s) Oral at bedtime    MEDICATIONS  (PRN):  acetaminophen     Tablet .. 650 milliGRAM(s) Oral every 6 hours PRN Temp greater or equal to 38C (100.4F), Mild Pain (1 - 3)  albuterol    0.083% 2.5 milliGRAM(s) Nebulizer every 6 hours PRN Shortness of Breath and/or Wheezing  dextrose Oral Gel 15 Gram(s) Oral once PRN Blood Glucose LESS THAN 70 milliGRAM(s)/deciliter  melatonin 3 milliGRAM(s) Oral at bedtime PRN Insomnia  ondansetron Injectable 4 milliGRAM(s) IV Push every 8 hours PRN Nausea and/or Vomiting  polyethylene glycol 3350 17 Gram(s) Oral daily PRN Constipation      Allergies    No Known Allergies    Intolerances        REVIEW OF SYSTEMS:  CONSTITUTIONAL: No fever or chills  CARDIOVASCULAR: No chest pain, palpitations    Vital Signs Last 24 Hrs  T(C): 36 (15 Apr 2023 05:51), Max: 36.7 (14 Apr 2023 19:52)  T(F): 96.8 (15 Apr 2023 05:51), Max: 98 (14 Apr 2023 19:52)  HR: 82 (15 Apr 2023 05:51) (77 - 86)  BP: 157/87 (15 Apr 2023 05:51) (133/73 - 157/87)  BP(mean): --  RR: 17 (15 Apr 2023 05:51) (17 - 17)  SpO2: 99% (15 Apr 2023 05:51) (97% - 100%)    Parameters below as of 15 Apr 2023 05:51  Patient On (Oxygen Delivery Method): room air      I&O's Summary    14 Apr 2023 07:01  -  15 Apr 2023 07:00  --------------------------------------------------------  IN: 0 mL / OUT: 1350 mL / NET: -1350 mL          PHYSICAL EXAM:  GENERAL: NAD  HEENT:  AT/NC, anicteric, moist mucous membranes, EOMI, PERRL, no lid-lag, conjunctiva and sclera clear  CHEST/LUNG: overall CTA b/l, no rales, wheezes, or rhonchi,  normal respiratory effort, no intercostal retractions  HEART: IRRR, S1, S2, 2/6 systolic murmurs; no pitting edema  ABDOMEN:  BS+, soft, nontender, nondistended  MSK/EXTREMITIES: palpable peripheral pulses, no clubbing or cyanosis  NERVOUS SYSTEM: follows commands appropriately, A&Ox2 grossly moves all extremities   PSYCH: Appropriate affect, Alert & Awake; poor judgement      LABS: Personally reviewed                        8.6    8.60  )-----------( 274      ( 15 Apr 2023 05:00 )             26.7     04-14    134  |  96  |  61  ----------------------------<  142  4.8   |  32  |  1.77    Ca    8.5      14 Apr 2023 05:00            PT/INR - ( 14 Apr 2023 05:00 )   PT: 11.0 sec;   INR: 0.95 ratio         PTT - ( 15 Apr 2023 05:00 )  PTT:31.9 sec                        POCT Blood Glucose.: 111 mg/dL (15 Apr 2023 08:53)  POCT Blood Glucose.: 88 mg/dL (15 Apr 2023 08:28)  POCT Blood Glucose.: 53 mg/dL (15 Apr 2023 08:04)  POCT Blood Glucose.: 53 mg/dL (15 Apr 2023 08:03)  POCT Blood Glucose.: 177 mg/dL (14 Apr 2023 21:21)  POCT Blood Glucose.: 158 mg/dL (14 Apr 2023 17:25)  POCT Blood Glucose.: 343 mg/dL (14 Apr 2023 11:47)          COVID-19 PCR: NotDetec (04-14-23 @ 17:30)  COVID-19 PCR: NotDetec (04-12-23 @ 23:12)          RADIOLOGY & ADDITIONAL TESTS: Personally reviewed.     Consultant(s) Notes Reviewed:  [x] YES  [ ] NO   Discussed with TRESA/SINDHU, RN

## 2023-04-15 NOTE — DISCHARGE NOTE NURSING/CASE MANAGEMENT/SOCIAL WORK - NSDCPEFALRISK_GEN_ALL_CORE
For information on Fall & Injury Prevention, visit: [email or web address] OR  [email or web address] OR  https://www.cdc.gov/steadi/patient.html For information on Fall & Injury Prevention, visit: [email or web address] OR  [email or web address] OR  https://www.cdc.gov/steadi/patient.html For information on Fall & Injury Prevention, visit: [email or web address]://[email or web address] OR  [email or web address]://[email or web address] OR  https://www.cdc.gov/steadi/patient.html

## 2023-04-21 ENCOUNTER — TRANSCRIPTION ENCOUNTER (OUTPATIENT)
Age: 88
End: 2023-04-21

## 2023-04-28 ENCOUNTER — TRANSCRIPTION ENCOUNTER (OUTPATIENT)
Age: 88
End: 2023-04-28

## 2023-05-05 ENCOUNTER — TRANSCRIPTION ENCOUNTER (OUTPATIENT)
Age: 88
End: 2023-05-05

## 2023-05-12 ENCOUNTER — TRANSCRIPTION ENCOUNTER (OUTPATIENT)
Age: 88
End: 2023-05-12

## 2023-05-31 ENCOUNTER — INPATIENT (INPATIENT)
Facility: HOSPITAL | Age: 88
LOS: 0 days | End: 2023-06-01
Attending: HOSPITALIST | Admitting: HOSPITALIST
Payer: MEDICARE

## 2023-05-31 VITALS — RESPIRATION RATE: 30 BRPM

## 2023-05-31 DIAGNOSIS — I48.20 CHRONIC ATRIAL FIBRILLATION, UNSPECIFIED: ICD-10-CM

## 2023-05-31 DIAGNOSIS — E87.5 HYPERKALEMIA: ICD-10-CM

## 2023-05-31 DIAGNOSIS — U07.1 COVID-19: ICD-10-CM

## 2023-05-31 DIAGNOSIS — J96.02 ACUTE RESPIRATORY FAILURE WITH HYPERCAPNIA: ICD-10-CM

## 2023-05-31 DIAGNOSIS — I10 ESSENTIAL (PRIMARY) HYPERTENSION: ICD-10-CM

## 2023-05-31 DIAGNOSIS — Z29.9 ENCOUNTER FOR PROPHYLACTIC MEASURES, UNSPECIFIED: ICD-10-CM

## 2023-05-31 DIAGNOSIS — E03.9 HYPOTHYROIDISM, UNSPECIFIED: ICD-10-CM

## 2023-05-31 DIAGNOSIS — I50.23 ACUTE ON CHRONIC SYSTOLIC (CONGESTIVE) HEART FAILURE: ICD-10-CM

## 2023-05-31 DIAGNOSIS — D64.9 ANEMIA, UNSPECIFIED: ICD-10-CM

## 2023-05-31 DIAGNOSIS — A41.9 SEPSIS, UNSPECIFIED ORGANISM: ICD-10-CM

## 2023-05-31 DIAGNOSIS — E11.9 TYPE 2 DIABETES MELLITUS WITHOUT COMPLICATIONS: ICD-10-CM

## 2023-05-31 DIAGNOSIS — J96.01 ACUTE RESPIRATORY FAILURE WITH HYPOXIA: ICD-10-CM

## 2023-05-31 PROBLEM — I35.0 NONRHEUMATIC AORTIC (VALVE) STENOSIS: Chronic | Status: ACTIVE | Noted: 2017-05-10

## 2023-05-31 LAB
ALBUMIN SERPL ELPH-MCNC: 3.9 G/DL — SIGNIFICANT CHANGE UP (ref 3.3–5)
ALP SERPL-CCNC: 48 U/L — SIGNIFICANT CHANGE UP (ref 40–120)
ALT FLD-CCNC: 8 U/L — SIGNIFICANT CHANGE UP (ref 4–33)
ANION GAP SERPL CALC-SCNC: 12 MMOL/L — SIGNIFICANT CHANGE UP (ref 7–14)
APTT BLD: 30.3 SEC — SIGNIFICANT CHANGE UP (ref 27–36.3)
AST SERPL-CCNC: 6 U/L — SIGNIFICANT CHANGE UP (ref 4–32)
B PERT DNA SPEC QL NAA+PROBE: SIGNIFICANT CHANGE UP
B PERT+PARAPERT DNA PNL SPEC NAA+PROBE: SIGNIFICANT CHANGE UP
BASE EXCESS BLDV CALC-SCNC: 0 MMOL/L — SIGNIFICANT CHANGE UP (ref -2–3)
BASOPHILS # BLD AUTO: 0.02 K/UL — SIGNIFICANT CHANGE UP (ref 0–0.2)
BASOPHILS NFR BLD AUTO: 0.3 % — SIGNIFICANT CHANGE UP (ref 0–2)
BILIRUB SERPL-MCNC: 0.2 MG/DL — SIGNIFICANT CHANGE UP (ref 0.2–1.2)
BLD GP AB SCN SERPL QL: NEGATIVE — SIGNIFICANT CHANGE UP
BLOOD GAS VENOUS COMPREHENSIVE RESULT: SIGNIFICANT CHANGE UP
BORDETELLA PARAPERTUSSIS (RAPRVP): SIGNIFICANT CHANGE UP
BUN SERPL-MCNC: 32 MG/DL — HIGH (ref 7–23)
C PNEUM DNA SPEC QL NAA+PROBE: SIGNIFICANT CHANGE UP
CALCIUM SERPL-MCNC: 8.8 MG/DL — SIGNIFICANT CHANGE UP (ref 8.4–10.5)
CHLORIDE BLDV-SCNC: 96 MMOL/L — SIGNIFICANT CHANGE UP (ref 96–108)
CHLORIDE SERPL-SCNC: 93 MMOL/L — LOW (ref 98–107)
CO2 BLDV-SCNC: 35.1 MMOL/L — HIGH (ref 22–26)
CO2 SERPL-SCNC: 25 MMOL/L — SIGNIFICANT CHANGE UP (ref 22–31)
CREAT SERPL-MCNC: 2.29 MG/DL — HIGH (ref 0.5–1.3)
EGFR: 20 ML/MIN/1.73M2 — LOW
EOSINOPHIL # BLD AUTO: 0 K/UL — SIGNIFICANT CHANGE UP (ref 0–0.5)
EOSINOPHIL NFR BLD AUTO: 0 % — SIGNIFICANT CHANGE UP (ref 0–6)
FLUAV SUBTYP SPEC NAA+PROBE: SIGNIFICANT CHANGE UP
FLUBV RNA SPEC QL NAA+PROBE: SIGNIFICANT CHANGE UP
GAS PNL BLDA: SIGNIFICANT CHANGE UP
GAS PNL BLDV: 130 MMOL/L — LOW (ref 136–145)
GLUCOSE BLDV-MCNC: 252 MG/DL — HIGH (ref 70–99)
GLUCOSE SERPL-MCNC: 247 MG/DL — HIGH (ref 70–99)
HADV DNA SPEC QL NAA+PROBE: SIGNIFICANT CHANGE UP
HCO3 BLDV-SCNC: 32 MMOL/L — HIGH (ref 22–29)
HCOV 229E RNA SPEC QL NAA+PROBE: SIGNIFICANT CHANGE UP
HCOV HKU1 RNA SPEC QL NAA+PROBE: SIGNIFICANT CHANGE UP
HCOV NL63 RNA SPEC QL NAA+PROBE: SIGNIFICANT CHANGE UP
HCOV OC43 RNA SPEC QL NAA+PROBE: SIGNIFICANT CHANGE UP
HCT VFR BLD CALC: 31 % — LOW (ref 34.5–45)
HCT VFR BLDA CALC: 26 % — LOW (ref 34.5–46.5)
HGB BLD CALC-MCNC: 8.8 G/DL — LOW (ref 11.7–16.1)
HGB BLD-MCNC: 8.8 G/DL — LOW (ref 11.5–15.5)
HMPV RNA SPEC QL NAA+PROBE: SIGNIFICANT CHANGE UP
HPIV1 RNA SPEC QL NAA+PROBE: SIGNIFICANT CHANGE UP
HPIV2 RNA SPEC QL NAA+PROBE: SIGNIFICANT CHANGE UP
HPIV3 RNA SPEC QL NAA+PROBE: SIGNIFICANT CHANGE UP
HPIV4 RNA SPEC QL NAA+PROBE: SIGNIFICANT CHANGE UP
IANC: 5.72 K/UL — SIGNIFICANT CHANGE UP (ref 1.8–7.4)
IMM GRANULOCYTES NFR BLD AUTO: 1.6 % — HIGH (ref 0–0.9)
INR BLD: 1.26 RATIO — HIGH (ref 0.88–1.16)
LACTATE BLDV-MCNC: 1.2 MMOL/L — SIGNIFICANT CHANGE UP (ref 0.5–2)
LYMPHOCYTES # BLD AUTO: 0.46 K/UL — LOW (ref 1–3.3)
LYMPHOCYTES # BLD AUTO: 6.5 % — LOW (ref 13–44)
M PNEUMO DNA SPEC QL NAA+PROBE: SIGNIFICANT CHANGE UP
MCHC RBC-ENTMCNC: 20.7 PG — LOW (ref 27–34)
MCHC RBC-ENTMCNC: 28.4 GM/DL — LOW (ref 32–36)
MCV RBC AUTO: 72.9 FL — LOW (ref 80–100)
MONOCYTES # BLD AUTO: 0.76 K/UL — SIGNIFICANT CHANGE UP (ref 0–0.9)
MONOCYTES NFR BLD AUTO: 10.7 % — SIGNIFICANT CHANGE UP (ref 2–14)
NEUTROPHILS # BLD AUTO: 5.72 K/UL — SIGNIFICANT CHANGE UP (ref 1.8–7.4)
NEUTROPHILS NFR BLD AUTO: 80.9 % — HIGH (ref 43–77)
NRBC # BLD: 4 /100 WBCS — HIGH (ref 0–0)
NRBC # FLD: 0.29 K/UL — HIGH (ref 0–0)
NT-PROBNP SERPL-SCNC: HIGH PG/ML
PCO2 BLDV: 112 MMHG — HIGH (ref 39–52)
PH BLDV: 7.06 — LOW (ref 7.32–7.43)
PLATELET # BLD AUTO: 474 K/UL — HIGH (ref 150–400)
PO2 BLDV: 65 MMHG — HIGH (ref 25–45)
POTASSIUM BLDV-SCNC: 5.9 MMOL/L — HIGH (ref 3.5–5.1)
POTASSIUM SERPL-MCNC: 5.8 MMOL/L — HIGH (ref 3.5–5.3)
POTASSIUM SERPL-SCNC: 5.8 MMOL/L — HIGH (ref 3.5–5.3)
PROT SERPL-MCNC: 6.9 G/DL — SIGNIFICANT CHANGE UP (ref 6–8.3)
PROTHROM AB SERPL-ACNC: 14.7 SEC — HIGH (ref 10.5–13.4)
RAPID RVP RESULT: DETECTED
RBC # BLD: 4.25 M/UL — SIGNIFICANT CHANGE UP (ref 3.8–5.2)
RBC # FLD: 21 % — HIGH (ref 10.3–14.5)
RH IG SCN BLD-IMP: NEGATIVE — SIGNIFICANT CHANGE UP
RSV RNA SPEC QL NAA+PROBE: SIGNIFICANT CHANGE UP
RV+EV RNA SPEC QL NAA+PROBE: SIGNIFICANT CHANGE UP
SAO2 % BLDV: 88.6 % — HIGH (ref 67–88)
SARS-COV-2 RNA SPEC QL NAA+PROBE: DETECTED
SODIUM SERPL-SCNC: 130 MMOL/L — LOW (ref 135–145)
TROPONIN T, HIGH SENSITIVITY RESULT: 64 NG/L — CRITICAL HIGH
WBC # BLD: 7.07 K/UL — SIGNIFICANT CHANGE UP (ref 3.8–10.5)
WBC # FLD AUTO: 7.07 K/UL — SIGNIFICANT CHANGE UP (ref 3.8–10.5)

## 2023-05-31 PROCEDURE — 99223 1ST HOSP IP/OBS HIGH 75: CPT | Mod: GC

## 2023-05-31 PROCEDURE — 99291 CRITICAL CARE FIRST HOUR: CPT

## 2023-05-31 PROCEDURE — 71045 X-RAY EXAM CHEST 1 VIEW: CPT | Mod: 26

## 2023-05-31 RX ORDER — VANCOMYCIN HCL 1 G
1000 VIAL (EA) INTRAVENOUS ONCE
Refills: 0 | Status: COMPLETED | OUTPATIENT
Start: 2023-05-31 | End: 2023-05-31

## 2023-05-31 RX ORDER — PIPERACILLIN AND TAZOBACTAM 4; .5 G/20ML; G/20ML
3.38 INJECTION, POWDER, LYOPHILIZED, FOR SOLUTION INTRAVENOUS ONCE
Refills: 0 | Status: COMPLETED | OUTPATIENT
Start: 2023-05-31 | End: 2023-05-31

## 2023-05-31 RX ORDER — OMEPRAZOLE 10 MG/1
1 CAPSULE, DELAYED RELEASE ORAL
Refills: 0 | DISCHARGE

## 2023-05-31 RX ORDER — APIXABAN 2.5 MG/1
1 TABLET, FILM COATED ORAL
Refills: 0 | DISCHARGE

## 2023-05-31 RX ORDER — ERYTHROPOIETIN 10000 [IU]/ML
40000 INJECTION, SOLUTION INTRAVENOUS; SUBCUTANEOUS
Refills: 0 | DISCHARGE

## 2023-05-31 RX ORDER — PIPERACILLIN AND TAZOBACTAM 4; .5 G/20ML; G/20ML
3.38 INJECTION, POWDER, LYOPHILIZED, FOR SOLUTION INTRAVENOUS ONCE
Refills: 0 | Status: DISCONTINUED | OUTPATIENT
Start: 2023-05-31 | End: 2023-05-31

## 2023-05-31 RX ORDER — PIPERACILLIN AND TAZOBACTAM 4; .5 G/20ML; G/20ML
3.38 INJECTION, POWDER, LYOPHILIZED, FOR SOLUTION INTRAVENOUS EVERY 12 HOURS
Refills: 0 | Status: DISCONTINUED | OUTPATIENT
Start: 2023-05-31 | End: 2023-06-01

## 2023-05-31 RX ORDER — METFORMIN HYDROCHLORIDE 850 MG/1
1 TABLET ORAL
Qty: 0 | Refills: 0 | DISCHARGE

## 2023-05-31 RX ORDER — ROSUVASTATIN CALCIUM 5 MG/1
1 TABLET ORAL
Qty: 0 | Refills: 0 | DISCHARGE

## 2023-05-31 RX ORDER — DEXTROSE 50 % IN WATER 50 %
25 SYRINGE (ML) INTRAVENOUS ONCE
Refills: 0 | Status: DISCONTINUED | OUTPATIENT
Start: 2023-05-31 | End: 2023-06-01

## 2023-05-31 RX ORDER — ASPIRIN/CALCIUM CARB/MAGNESIUM 324 MG
1 TABLET ORAL
Qty: 0 | Refills: 0 | DISCHARGE

## 2023-05-31 RX ORDER — LEVOTHYROXINE SODIUM 125 MCG
1 TABLET ORAL
Qty: 0 | Refills: 0 | DISCHARGE

## 2023-05-31 RX ORDER — ALBUTEROL 90 UG/1
0 AEROSOL, METERED ORAL
Qty: 0 | Refills: 0 | DISCHARGE

## 2023-05-31 RX ORDER — ATORVASTATIN CALCIUM 80 MG/1
1 TABLET, FILM COATED ORAL
Refills: 0 | DISCHARGE

## 2023-05-31 RX ORDER — INSULIN LISPRO 100/ML
VIAL (ML) SUBCUTANEOUS
Refills: 0 | Status: DISCONTINUED | OUTPATIENT
Start: 2023-05-31 | End: 2023-06-01

## 2023-05-31 RX ORDER — INSULIN LISPRO 100/ML
VIAL (ML) SUBCUTANEOUS AT BEDTIME
Refills: 0 | Status: DISCONTINUED | OUTPATIENT
Start: 2023-05-31 | End: 2023-06-01

## 2023-05-31 RX ORDER — METOPROLOL TARTRATE 50 MG
5 TABLET ORAL ONCE
Refills: 0 | Status: COMPLETED | OUTPATIENT
Start: 2023-05-31 | End: 2023-05-31

## 2023-05-31 RX ORDER — LEVOTHYROXINE SODIUM 125 MCG
17.5 TABLET ORAL AT BEDTIME
Refills: 0 | Status: DISCONTINUED | OUTPATIENT
Start: 2023-05-31 | End: 2023-06-01

## 2023-05-31 RX ORDER — AZITHROMYCIN 500 MG/1
4 TABLET, FILM COATED ORAL
Refills: 0 | DISCHARGE

## 2023-05-31 RX ORDER — FUROSEMIDE 40 MG
40 TABLET ORAL ONCE
Refills: 0 | Status: COMPLETED | OUTPATIENT
Start: 2023-05-31 | End: 2023-05-31

## 2023-05-31 RX ORDER — DEXTROSE 50 % IN WATER 50 %
15 SYRINGE (ML) INTRAVENOUS ONCE
Refills: 0 | Status: DISCONTINUED | OUTPATIENT
Start: 2023-05-31 | End: 2023-06-01

## 2023-05-31 RX ORDER — FERROUS SULFATE 325(65) MG
1 TABLET ORAL
Refills: 0 | DISCHARGE

## 2023-05-31 RX ORDER — GLUCAGON INJECTION, SOLUTION 0.5 MG/.1ML
1 INJECTION, SOLUTION SUBCUTANEOUS ONCE
Refills: 0 | Status: DISCONTINUED | OUTPATIENT
Start: 2023-05-31 | End: 2023-06-01

## 2023-05-31 RX ORDER — SENNA PLUS 8.6 MG/1
1 TABLET ORAL
Refills: 0 | DISCHARGE

## 2023-05-31 RX ORDER — ALOGLIPTIN 12.5 MG/1
1 TABLET, FILM COATED ORAL
Refills: 0 | DISCHARGE

## 2023-05-31 RX ORDER — DEXAMETHASONE 0.5 MG/5ML
10 ELIXIR ORAL ONCE
Refills: 0 | Status: COMPLETED | OUTPATIENT
Start: 2023-05-31 | End: 2023-05-31

## 2023-05-31 RX ORDER — CARVEDILOL PHOSPHATE 80 MG/1
1 CAPSULE, EXTENDED RELEASE ORAL
Qty: 0 | Refills: 0 | DISCHARGE

## 2023-05-31 RX ORDER — FUROSEMIDE 40 MG
20 TABLET ORAL ONCE
Refills: 0 | Status: COMPLETED | OUTPATIENT
Start: 2023-05-31 | End: 2023-05-31

## 2023-05-31 RX ORDER — ZINC GLUCONATE 30 MG
1 TABLET ORAL
Refills: 0 | DISCHARGE

## 2023-05-31 RX ORDER — FOLIC ACID 0.8 MG
1 TABLET ORAL
Refills: 0 | DISCHARGE

## 2023-05-31 RX ORDER — IPRATROPIUM/ALBUTEROL SULFATE 18-103MCG
3 AEROSOL WITH ADAPTER (GRAM) INHALATION EVERY 6 HOURS
Refills: 0 | Status: DISCONTINUED | OUTPATIENT
Start: 2023-05-31 | End: 2023-06-01

## 2023-05-31 RX ORDER — GLIMEPIRIDE 1 MG
1 TABLET ORAL
Qty: 0 | Refills: 0 | DISCHARGE

## 2023-05-31 RX ORDER — FUROSEMIDE 40 MG
40 TABLET ORAL DAILY
Refills: 0 | Status: DISCONTINUED | OUTPATIENT
Start: 2023-06-01 | End: 2023-06-01

## 2023-05-31 RX ORDER — DEXTROSE 50 % IN WATER 50 %
12.5 SYRINGE (ML) INTRAVENOUS ONCE
Refills: 0 | Status: DISCONTINUED | OUTPATIENT
Start: 2023-05-31 | End: 2023-06-01

## 2023-05-31 RX ORDER — SODIUM CHLORIDE 9 MG/ML
1000 INJECTION, SOLUTION INTRAVENOUS
Refills: 0 | Status: DISCONTINUED | OUTPATIENT
Start: 2023-05-31 | End: 2023-06-01

## 2023-05-31 RX ORDER — AMLODIPINE BESYLATE 2.5 MG/1
1 TABLET ORAL
Qty: 0 | Refills: 0 | DISCHARGE

## 2023-05-31 RX ORDER — IPRATROPIUM/ALBUTEROL SULFATE 18-103MCG
3 AEROSOL WITH ADAPTER (GRAM) INHALATION
Refills: 0 | DISCHARGE

## 2023-05-31 RX ORDER — ERYTHROPOIETIN 10000 [IU]/ML
40000 INJECTION, SOLUTION INTRAVENOUS; SUBCUTANEOUS
Refills: 0 | Status: DISCONTINUED | OUTPATIENT
Start: 2023-06-01 | End: 2023-06-01

## 2023-05-31 RX ORDER — MONTELUKAST 4 MG/1
1 TABLET, CHEWABLE ORAL
Refills: 0 | DISCHARGE

## 2023-05-31 RX ORDER — BUDESONIDE AND FORMOTEROL FUMARATE DIHYDRATE 160; 4.5 UG/1; UG/1
2 AEROSOL RESPIRATORY (INHALATION)
Refills: 0 | DISCHARGE

## 2023-05-31 RX ORDER — DEXAMETHASONE 0.5 MG/5ML
6 ELIXIR ORAL DAILY
Refills: 0 | Status: DISCONTINUED | OUTPATIENT
Start: 2023-06-01 | End: 2023-06-01

## 2023-05-31 RX ORDER — AZITHROMYCIN 500 MG/1
500 TABLET, FILM COATED ORAL EVERY 24 HOURS
Refills: 0 | Status: DISCONTINUED | OUTPATIENT
Start: 2023-06-01 | End: 2023-06-01

## 2023-05-31 RX ADMIN — Medication 17.5 MICROGRAM(S): at 23:23

## 2023-05-31 RX ADMIN — PIPERACILLIN AND TAZOBACTAM 200 GRAM(S): 4; .5 INJECTION, POWDER, LYOPHILIZED, FOR SOLUTION INTRAVENOUS at 11:44

## 2023-05-31 RX ADMIN — Medication 250 MILLIGRAM(S): at 10:20

## 2023-05-31 RX ADMIN — Medication 102 MILLIGRAM(S): at 10:20

## 2023-05-31 RX ADMIN — Medication 5 MILLIGRAM(S): at 16:36

## 2023-05-31 RX ADMIN — Medication 20 MILLIGRAM(S): at 13:38

## 2023-05-31 RX ADMIN — Medication 4: at 17:21

## 2023-05-31 RX ADMIN — Medication 40 MILLIGRAM(S): at 18:09

## 2023-05-31 NOTE — ED PROVIDER NOTE - NSICDXPASTMEDICALHX_GEN_ALL_CORE_FT
PAST MEDICAL HISTORY:  DM (diabetes mellitus)     HTN (hypertension)     Hypothyroid     Moderate aortic stenosis

## 2023-05-31 NOTE — H&P ADULT - PROBLEM SELECTOR PLAN 11
- DVT ppx: Eliquis (Hold for now); SCD for now  - Diet: NPO pending speech and swallow eval and improved respiratory status  - GOC: DNR/DNI  - Sacral ulcer stage 2- wound care

## 2023-05-31 NOTE — H&P ADULT - NSHPSOCIALHISTORY_GEN_ALL_CORE
Pt resides at [facility]. Pt does not have any hx of tobacco, alcohol, or other illicit drug use. Pt resides at [facility]. Unable to obtain social hx iso pt's AMS.

## 2023-05-31 NOTE — H&P ADULT - ASSESSMENT
82 yo F w/ PMH HTN, HLD, hypothyroidism, mod AS, CHF, CKD, admit from [facility] (dc'd from [facility] after admit for respiratory distress 2/2 RSV+) iso dyspnea, lethargy. Of note COVID+ 2 days ago, initiated on azithromycin admitted to medicine for further management,

## 2023-05-31 NOTE — ED ADULT NURSE NOTE - NSFALLHARMRISKINTERV_ED_ALL_ED

## 2023-05-31 NOTE — H&P ADULT - NSHPLABSRESULTS_GEN_ALL_CORE
8.8    7.07  )-----------( 474      ( 31 May 2023 10:00 )             31.0       05-31    130<L>  |  93<L>  |  32<H>  ----------------------------<  247<H>  5.8<H>   |  25  |  2.29<H>    Ca    8.8      31 May 2023 10:49    TPro  6.9  /  Alb  3.9  /  TBili  0.2  /  DBili  x   /  AST  6   /  ALT  8   /  AlkPhos  48  05-31              PT/INR - ( 31 May 2023 10:00 )   PT: 14.7 sec;   INR: 1.26 ratio         PTT - ( 31 May 2023 10:00 )  PTT:30.3 sec      < from: Xray Chest 1 View- PORTABLE-Urgent (05.31.23 @ 13:24) >        IMPRESSION: Limited study showing clear lungs with small effusions.    < end of copied text >

## 2023-05-31 NOTE — H&P ADULT - PROBLEM SELECTOR PLAN 4
- Consult nephro   - - On Eliquis at home  - C/w home eliquis  - Monitor on Tele  - - On Eliquis at home  - C/w home eliquis  - Monitor on Telemetry

## 2023-05-31 NOTE — CONSULT NOTE ADULT - ASSESSMENT
83F HTN, HLD, hypothyroidism, mod AS, CHF, CKD, admit from [facility] (dc'd from [facility] after admit for respiratory distress 2/2 RSV+), recent COV+ now a/w hypercapnic respiratory failure, MICU c/s given NIPPV administration      #acute hypercapnic respitary failure  VBG 7.06/112/65/32 iso likely COVID PNA w/ superimposed hypervolemia (proBNP 64826) +/- renal flr  - c/w BiPAP, monitor VBG and titrate settings accordingly  - would tx empirically for COVID and HAP w/ vanc (by level), zosyn (renal dose), azithro (pending QTc check), pending Cx, MRSA, Ulegionella, Ustrep to narrow and otherwise obtain infxs w/u including UA/UCx, BCx   - c/s nephro re renal flr w/ hyperK, given concurrent vol overload would consider lasix   - obtain formal TTE to eval structure/fn and consider diuresis while monitoring strict Is/Os, daily standing wt   - continued GOC conversation    Mali Harley MD PGY-3  Internal Medicine Resident  [facility] Medical Intensive Care Unit    *recommendations preliminary until attending attestation placed*  83F HTN, HLD, hypothyroidism, mod AS, CHF, CKD, admit from [facility] (dc'd from [facility] after admit for respiratory distress 2/2 RSV+), recent COV+ now a/w hypercapnic respiratory failure, MICU c/s given NIPPV administration      #acute hypercapnic respitary failure  VBG 7.06/112/65/32 iso likely COVID PNA w/ superimposed hypervolemia (proBNP 56576) +/- renal flr  - c/w BiPAP, monitor VBG and titrate settings accordingly  - duonebs q6h given poor air movement/wheeze on exam  - would tx empirically for COVID and HAP w/ vanc (by level), zosyn (renal dose), azithro (pending QTc check), pending Cx, MRSA, Ulegionella, Ustrep to narrow and otherwise obtain infxs w/u including UA/UCx, BCx   - c/s nephro re renal flr w/ hyperK, given concurrent vol overload would give lasix 40mg IV and reassess  - obtain formal TTE to eval structure/fn and diuresis as above while monitoring strict Is/Os, daily standing wt   - continued GOC conversation    Mali Harley MD PGY-3  Internal Medicine Resident  [facility] Medical Intensive Care Unit    *recommendations preliminary until attending attestation placed*

## 2023-05-31 NOTE — CONSULT NOTE ADULT - ATTENDING COMMENTS
83 F htn, hld, AS, CKD here with acute hypoxemic and hypercapnic respiratory failure due to COVID +/- acute pulmonary edema    Patient is DNR/DNI.  Pulling good volumes on NIPPV without air leak at time of my exam    # acute hypoxemic and hypercapnic respiratory failure  # COVID  # acute pulmonary edema  - would c/w NIPPV, check serial ABG if in line with GOC; can consider switching to AVAPS if need be  - would do steroids for COVID, consider remdesivir if okay for CrCl  - diurese prn    Patient does not require MICU level of care at this time.  Please re-consult as needed.

## 2023-05-31 NOTE — H&P ADULT - PROBLEM SELECTOR PLAN 2
- Tested pos for COVID19 on admission  - C/w BiPAP  - Repeat BMP Q6H  - F/u daily VBG  - Treat empirically for COVID and HAP w/ vanc, zosyn , azithro  - f/u ucx, bcx, ua - Currently on NIPPV via BiPAP  - VBG on admission: 7.06/112/65/32  - most likely 2/2 COVID+ infection  - C/w BiPAP   - C/w stephanie Q6H

## 2023-05-31 NOTE — H&P ADULT - PROBLEM SELECTOR PLAN 9
- C/w synthroid - /114 on admission  - On home amlodipine 5 mg QD, clonidine 0.2 mg BID  - Hold antihypertensive medications  - CTM BP and adjust medications as needed  - IV PRN  - clonidine patch 0.2 mg BID and 100% PO dose  Day 2: keep patch on and 50% PO dose  Day 3: Patch on and 25% PO dose  Day 4 Patch and no further PO dose

## 2023-05-31 NOTE — H&P ADULT - PROBLEM SELECTOR PLAN 7
- Fluid overloaded on PE  - BNP 14448 on admission, Trop elevated to 64  - S/p lasix  - F/u TTE  - C/w diuresis   - Monitor strict I/Os, daily standing weights

## 2023-05-31 NOTE — H&P ADULT - PROBLEM SELECTOR PLAN 3
- Tested pos for COVID19 on admission  - C/w BiPAP  - Repeat BMP Q6H  - F/u daily VBG  - C/w decadron - Tested pos for COVID19 on admission  - C/w BiPAP  - F/u daily VBGs  - C/w decadron

## 2023-05-31 NOTE — ED PROVIDER NOTE - CLINICAL SUMMARY MEDICAL DECISION MAKING FREE TEXT BOX
Rakan: Recent COVID Dx. At [facility]. P/w sudden SOB. (B) LE edema. DNR/DNI. Shallow, frequent respirations. Obtunded. DDx: PNA, CHF, PE, PREM. Check CXR, EKG, trop, BNP. Give ABx and Dexamethasone. Bi-PAP (given AMS, may need AVAPS). Admit.

## 2023-05-31 NOTE — PATIENT PROFILE ADULT - ABILITY TO HEAR (WITH HEARING AID OR HEARING APPLIANCE IF NORMALLY USED):
as per Pt son/Mildly to Moderately Impaired: difficulty hearing in some environments or speaker may need to increase volume or speak distinctly

## 2023-05-31 NOTE — H&P ADULT - PROBLEM SELECTOR PLAN 6
- K 5.8 on admission  - insulin and dextrose  - Consult nephro as needed  - C/w diuresis - K 5.8 on admission  - Consult nephro as needed  - C/w diuresis lasix 40 IV

## 2023-05-31 NOTE — CONSULT NOTE ADULT - SUBJECTIVE AND OBJECTIVE BOX
CHIEF COMPLAINT: dyspnea    HPI: 83F HTN, HLD, hypothyroidism, mod AS, CHF, CKD, admit from [facility] (dc'd from [facility] after admit for respiratory distress 2/2 RSV+) iso dyspnea, lethargy. Of note COVID+ 2 days ago, initiated on azithromycin.    VS: hypothermic 33.7C, HR 70s-100s, BP 100s-140s/70s-80s, RR 30-40, SpO2% 100 on supplemental O2   labs: WBC wnl though neutrophil predominant, microcytic (MCV 72.9) anemia H/H 8.8/31, thrombocytosis 474; hypoNa corrected 132k, hyperK 5.8, hypoCl 93, SCr elevated 2.29; HST 64, proBNP 98809; VBG 7.06/112/65/32 lact 1.2  micro: COV+  imaging: not obtained  received: dexamethasone 10mg IV, zosyn 3.375g IV, vanc 1g IV    MICU c/s iso hypercapnic respiratory flr on NIPPV via BiPAP    PAST MEDICAL & SURGICAL HISTORY:  DM (diabetes mellitus)      HTN (hypertension)      Hypothyroid      Moderate aortic stenosis      No significant past surgical history          FAMILY HISTORY:  No pertinent family history in first degree relatives        SOCIAL HISTORY: unable to elicit     Allergies    No Known Allergies    Intolerances        HOME MEDICATIONS:    REVIEW OF SYSTEMS:     OBJECTIVE:  ICU Vital Signs Last 24 Hrs  T(C): 36.3 (31 May 2023 12:13), Max: 36.3 (31 May 2023 12:13)  T(F): 97.4 (31 May 2023 12:13), Max: 97.4 (31 May 2023 12:13)  HR: 106 (31 May 2023 12:13) (77 - 106)  BP: 106/83 (31 May 2023 12:13) (106/83 - 143/79)  BP(mean): --  ABP: --  ABP(mean): --  RR: 33 (31 May 2023 12:13) (30 - 40)  SpO2: 100% (31 May 2023 12:13) (100% - 100%)    O2 Parameters below as of 31 May 2023 12:13  Patient On (Oxygen Delivery Method): BiPAP/CPAP              CAPILLARY BLOOD GLUCOSE          PHYSICAL EXAM:      HOSPITAL MEDICATIONS:  MEDICATIONS  (STANDING):    MEDICATIONS  (PRN):      LABS:                        8.8    7.07  )-----------( 474      ( 31 May 2023 10:00 )             31.0     05-31    130<L>  |  93<L>  |  32<H>  ----------------------------<  247<H>  5.8<H>   |  25  |  2.29<H>    Ca    8.8      31 May 2023 10:49    TPro  6.9  /  Alb  3.9  /  TBili  0.2  /  DBili  x   /  AST  6   /  ALT  8   /  AlkPhos  48  05-31    PT/INR - ( 31 May 2023 10:00 )   PT: 14.7 sec;   INR: 1.26 ratio         PTT - ( 31 May 2023 10:00 )  PTT:30.3 sec      Venous Blood Gas:  05-31 @ 10:00  7.06/112/65/32/88.6  VBG Lactate: 1.2      MICROBIOLOGY: above    RADIOLOGY: above CHIEF COMPLAINT: dyspnea    HPI: 83F HTN, HLD, hypothyroidism, mod AS, CHF, CKD, admit from [facility] (dc'd from [facility] after admit for respiratory distress 2/2 RSV+) iso dyspnea, lethargy. Of note COVID+ 2 days ago, initiated on azithromycin per ED documentation. Assessed pt at bedside, unable to participate in interview 2/2 mentation.     VS: hypothermic 33.7C, HR 70s-100s, BP 100s-140s/70s-80s, RR 30-40, SpO2% 100 on supplemental O2   labs: WBC wnl though neutrophil predominant, microcytic (MCV 72.9) anemia H/H 8.8/31, thrombocytosis 474; hypoNa corrected 132k, hyperK 5.8, hypoCl 93, SCr elevated 2.29; HST 64, proBNP 16159; VBG 7.06/112/65/32 lact 1.2  micro: COV+  imaging: not obtained  received: dexamethasone 10mg IV, zosyn 3.375g IV, vanc 1g IV    MICU c/s iso hypercapnic respiratory flr on NIPPV via BiPAP    PAST MEDICAL & SURGICAL HISTORY:  DM (diabetes mellitus)      HTN (hypertension)      Hypothyroid      Moderate aortic stenosis      No significant past surgical history          FAMILY HISTORY:  No pertinent family history in first degree relatives        SOCIAL HISTORY: unable to elicit     Allergies    No Known Allergies    Intolerances        HOME MEDICATIONS:    REVIEW OF SYSTEMS: unable to elicit as above     OBJECTIVE:  ICU Vital Signs Last 24 Hrs  T(C): 36.3 (31 May 2023 12:13), Max: 36.3 (31 May 2023 12:13)  T(F): 97.4 (31 May 2023 12:13), Max: 97.4 (31 May 2023 12:13)  HR: 106 (31 May 2023 12:13) (77 - 106)  BP: 106/83 (31 May 2023 12:13) (106/83 - 143/79)  BP(mean): --  ABP: --  ABP(mean): --  RR: 33 (31 May 2023 12:13) (30 - 40)  SpO2: 100% (31 May 2023 12:13) (100% - 100%)    O2 Parameters below as of 31 May 2023 12:13  Patient On (Oxygen Delivery Method): BiPAP/CPAP              CAPILLARY BLOOD GLUCOSE        PHYSICAL EXAM:  GENERAL: Elderly-appearing woman, supine in ED stretcher, w/ NIPPV mask applied, well-groomed, well-developed, unable to participate in interview    HEAD: Atraumatic, Normocephalic  EYES: PERRL, conjunctiva and sclera clear  ENMT: Difficult to assess iso NIPPV mask   NECK: Supple  NERVOUS SYSTEM: Alert & Oriented X0, unable to participate in interview, opens eyes to verbal stimuli but otherwise difficult to assess sensorimotor exam  CHEST/LUNG: +poor air movement b/l w/ wheezes, tachypneic and using accessory muscles of respiration on NIPPV   HEART: +Tachycardic and regular rhythm; No murmurs, rubs, or gallops  ABDOMEN: Soft, Nontender, Nondistended; Bowel sounds present. No guarding, rebound tenderness, or rigidity.  EXTREMITIES: 2+ Peripheral Pulses, 2-3+ pitting edema b/l LE to [facility] MEDICATIONS:  MEDICATIONS  (STANDING):    MEDICATIONS  (PRN):      LABS:                        8.8    7.07  )-----------( 474      ( 31 May 2023 10:00 )             31.0     05-31    130<L>  |  93<L>  |  32<H>  ----------------------------<  247<H>  5.8<H>   |  25  |  2.29<H>    Ca    8.8      31 May 2023 10:49    TPro  6.9  /  Alb  3.9  /  TBili  0.2  /  DBili  x   /  AST  6   /  ALT  8   /  AlkPhos  48  05-31    PT/INR - ( 31 May 2023 10:00 )   PT: 14.7 sec;   INR: 1.26 ratio         PTT - ( 31 May 2023 10:00 )  PTT:30.3 sec      Venous Blood Gas:  05-31 @ 10:00  7.06/112/65/32/88.6  VBG Lactate: 1.2      MICROBIOLOGY: above    RADIOLOGY: above

## 2023-05-31 NOTE — H&P ADULT - ATTENDING COMMENTS
Patient seen and examined at bedside. In brief,  patient is a 83 female with history of  HTN, HLD, hypothyroidism, mod AS, CHF, CKD, recently discharged from  [facility]  for respiratory distress 2/2 RSV, presenting from [facility] for respiratory distress and lethargy. Patient found to be  hypoxic with  hyponatremia to 130, hyperkalemia to 5.8, elevated troponin 64. elevated BNP of 15,958, and elevated bicarb to 112, with + COVID positive c/w   hypercapnic respiratory failure,  - Patient currently on Bipap, continue for now as patient DNR/DNI and volume overloaded with hypercapnea   -  MICU c/s given NIPPV administration    - S/p Lasix 20mg in ED, will start on IV  lasix 40 and monitor for volume exam   - F/U TTE   - Decadron and supportive care for COVID   #Electrolyte abnormality  - Hyperkalemia - continue lasix, albuterol,   #HTN  - Continue home BP meds   #Afib   - Continue eliquis, once passes Speech and swallow   #Guarded prognosis   Rest of plan as above

## 2023-05-31 NOTE — PROVIDER CONTACT NOTE (OTHER) - SITUATION
MD made aware Pt didn't come up with molts in chart. MD made aware Pt didn't come up with molts form in chart.

## 2023-05-31 NOTE — H&P ADULT - NSHPREVIEWOFSYSTEMS_GEN_ALL_CORE
REVIEW OF SYSTEMS:    Constitutional: No fever, chills, night sweats, or fatigue  	Eyes:  No eye pain, visual disturbances, or discharge  	ENMT:  No neck pain  	Cardiac:  No chest pain, palpitations, no leg swelling  	Respiratory:  No cough, SOB  	GI:  No nausea, vomiting, diarrhea, abdominal pain.  	:  no dysuria, hematuria, or incontinence  	MS:  No back pain.  	Neuro:  No headache or lightheadedness, dizziness   	Skin:  No skin rash

## 2023-05-31 NOTE — H&P ADULT - HISTORY OF PRESENT ILLNESS
82 yo F w/ PMH HTN, HLD, hypothyroidism, mod AS, CHF, CKD, admit from [facility] (dc'd from [facility] after admit for respiratory distress 2/2 RSV+) iso dyspnea, lethargy. Of note COVID+ 2 days ago, initiated on azithromycin per ED documentation. Assessed pt at bedside, unable to participate in interview 2/2 mentation.     In ED: s/p vanc and zosyn, decadron 10 mg, and lasix 20 mg IVP 82 yo F w/ PMH HTN, HLD, hypothyroidism, mod AS, CHF, CKD, admit from [facility] (dc'd from [facility] after admit for respiratory distress 2/2 RSV+) iso dyspnea, lethargy. Of note COVID+ 2 days ago, initiated on azithromycin per ED documentation. Assessed pt at bedside. ROS limited due to pt's AMS. Pt not following commands at this time.    In ED: s/p vanc and zosyn, decadron 10 mg, and lasix 20 mg IVP

## 2023-05-31 NOTE — GOALS OF CARE CONVERSATION - ADVANCED CARE PLANNING - CONVERSATION DETAILS
I called family to inform them of patient's clinical status and that at this time, she remains acutely ill and remains on BIPAP for acute hypercapnic respiratory failure. I stated that at this time, it is difficult to predict her clinical course. I also discussed code status. Son (HCP) confirms that patient is DNR/DNI. Son states that patient had always been clear in her wish not to have breathing tube nor undergo chest compressions. I called family to inform them of patient's clinical status and that at this time, she remains acutely ill and remains on BIPAP for acute hypercapnic respiratory failure. I stated that at this time, it is difficult to predict her clinical course and prognosis is guarded. I also discussed code status. Son (HCP) confirms that patient is DNR/DNI. Son states that patient had always been clear in her wish not to have breathing tube nor undergo chest compressions.

## 2023-05-31 NOTE — H&P ADULT - NSHPPHYSICALEXAM_GEN_ALL_CORE
VITALS:   T(C): 35.8 (05-31-23 @ 13:49), Max: 36.3 (05-31-23 @ 12:13)  HR: 102 (05-31-23 @ 13:49) (77 - 106)  BP: 132/114 (05-31-23 @ 13:49) (106/83 - 143/79)  RR: 30 (05-31-23 @ 13:49) (30 - 40)  SpO2: 100% (05-31-23 @ 13:49) (100% - 100%)    PHYSICAL EXAM:     GENERAL: NAD, lying in bed comfortably  HEAD:  Atraumatic, Normocephalic  EYES: EOMI, PERRLA, conjunctiva and sclera clear  ENT: Moist mucous membranes  NECK: Supple, No JVD  CHEST/LUNG: Clear to auscultation bilaterally; No rales, rhonchi, wheezing, or rubs. Unlabored respirations  HEART: Regular rate and rhythm; No murmurs, rubs, or gallops  ABDOMEN: normal bowel sounds; Soft, nontender, nondistended  EXTREMITIES:  2+ Peripheral Pulses, brisk capillary refill. No clubbing, cyanosis, or edema  Neurological:  A&Ox3, no focal deficits   SKIN: No rashes or lesions  PSYCH: normal affect and mood VITALS:   T(C): 35.8 (05-31-23 @ 13:49), Max: 36.3 (05-31-23 @ 12:13)  HR: 102 (05-31-23 @ 13:49) (77 - 106)  BP: 132/114 (05-31-23 @ 13:49) (106/83 - 143/79)  RR: 30 (05-31-23 @ 13:49) (30 - 40)  SpO2: 100% (05-31-23 @ 13:49) (100% - 100%)    PHYSICAL EXAM:     GENERAL: +lynch catheter in place draining yellow urine; +BiPAP in place  HEAD:  Atraumatic, Normocephalic  EYES: EOMI, PERRLA, conjunctiva and sclera clear  ENT: Moist mucous membranes  NECK: Supple, No JVD  CHEST/LUNG: +esequiel reduced breath sounds w/ wheezing and accessory muscle use  HEART: tachycardic and irregular rhythm; No murmurs, rubs, or gallops  ABDOMEN: normal bowel sounds; Soft, nontender, nondistended  EXTREMITIES:  Esequiel 2-3+ pitting edema to knees; 2+ Peripheral Pulses, brisk capillary refill. No clubbing or cyanosis  Neurological:  A&Ox0, no focal deficits, unable to follow commands. Opens eyes to sternal rub  SKIN: +sacral ulcer  PSYCH: normal affect and mood

## 2023-05-31 NOTE — ED PROVIDER NOTE - OBJECTIVE STATEMENT
89 y/o female pmh htn, dm, CHF, CKD, hypothyroidism sent in from [facility] for respiratory distress. As per EMS, pt was found by staff this AM in respiratory distress  and lethargic. Pt was transferred to [place] after admission for respiratory distress 2/2 RSV at [facility]. Pt tested + for covid x 2 days ago and was started on zithromax. Pt unable to give an hx.

## 2023-05-31 NOTE — H&P ADULT - PROBLEM SELECTOR PLAN 1
- Currently on NIPPV via BiPAP  - VBG on admission: 7.06/112/65/32  - most likely 2/2 COVID+ infection  - C/w BiPAP  - C/w stephanie Q6H - Met SIRS criteria on admission: hypothermic to 35.8C, tachycardic to 102  - Most likely iso COVID infection  - F/u ucx, bcx, urine legionella, MRSA, urine strep  - Treat empirically for COVID and HAP w/ vanc, zosyn , azithro ([id number]) - Met SIRS criteria on admission: hypothermic to 35.8C, tachycardic to 102  - Most likely iso COVID infection  - F/u ucx, bcx, urine legionella, MRSA, urine strep  - Treat empirically for COVID and HAP w/ vanc, zosyn , azithro ([id number])  - C/w decadron x10 days. No remdesivir at this time iso pt's eGFR <20.

## 2023-05-31 NOTE — H&P ADULT - PROBLEM SELECTOR PLAN 8
- /114 on admission  - On home __  - Hold antihypertensive medications  - CTM BP and adjust medications as needed - Hgb 8.8 on admission  - C/w home ferrous sulfate 325 mg QD  - Cont to trend CBC daily  - Maintain active type & screen and transfuse for hgb <8

## 2023-05-31 NOTE — H&P ADULT - PROBLEM SELECTOR PLAN 5
- f/u a1c  - Hold home metformin. ISS while in pt  - continue to monitor and adjust as necessary - f/u a1c  - Hold home metformin 500 mg BID while inpt. ISS while in pt  - continue to monitor and adjust as necessary  - DASH/CC diet pending speech and swallow eval

## 2023-05-31 NOTE — H&P ADULT - PROBLEM SELECTOR PLAN 10
- DVT ppx: Eliquis  - Diet: NPO pending speech and swallow eval and improved respiratory status  - GOC: DNR/DNI - On synthroid 25 mcg QAM  - IV synthroid 12.5 mcg while NPO  - F/u TSH

## 2023-05-31 NOTE — ED PROVIDER NOTE - CRITICAL CARE ATTENDING CONTRIBUTION TO CARE
I performed a face-to-face evaluation of the patient and performed a history and physical examination. I agree with the history and physical examination. If this was a PA visit, I personally saw the patient with the PA and performed a substantive portion of the visit including all aspects of the medical decision making.    Recent COVID Dx. At [facility]. P/w sudden SOB. (B) LE edema. DNR/DNI. Shallow, frequent respirations. Obtunded. DDx: PNA, CHF, PE, PREM. Check CXR, EKG, trop, BNP. Give ABx and Dexamethasone. Bi-PAP (given AMS, may need AVAPS). Admit.    I personally saw the patient with the PA and provided a substantive portion of the care and the majority of the critical care time.     I have personally provided the amount of critical care time documented below, excluding time spent on separate procedures. I read the EMS record or spoke with EMS.

## 2023-05-31 NOTE — ED PROVIDER NOTE - PHYSICAL EXAMINATION
altered mental status, responds to painful stimuli only,   In respiratory distress, tachypneic, 4+ pitting edema, dimished lung sounds diffusely.

## 2023-05-31 NOTE — ED ADULT NURSE NOTE - OBJECTIVE STATEMENT
Funmilayo RN  Received pt in bed Alert to painful stimuli, non verbal, with rapid shallow respirations,  as per EMS pt with AMS since this morning with hx of PNA. audible rales noted on expirations , lung sounds with rales and wheezing B/L. abd distended but soft pt arrives with Ontiveros catheter in place 14 fr with no urinary output noted, skin color appropriate for ethnicity, sk is cold to touch, pt placed on monitor with VS as noted, Bi-pap initiated by RT with setting 10, 5, 100%, IV initiated 20 G left AC and 18 G right AC, labs drawn and sent, pt placed on warming  blanket, personal care performed, Ontiveros catheter changed with no UO noted, Placement confirmed by bedside ultrasound.

## 2023-05-31 NOTE — ED PROVIDER NOTE - PROGRESS NOTE DETAILS
PA Anand- POCUS echo shows decreased EF, poor LV function, large LA, no signs of R heart strain, trace pericardial effusion. IVC plethoric with no respiratory variation.

## 2023-05-31 NOTE — PATIENT PROFILE ADULT - FALL HARM RISK - HARM RISK INTERVENTIONS

## 2023-06-01 VITALS
DIASTOLIC BLOOD PRESSURE: 72 MMHG | TEMPERATURE: 99 F | OXYGEN SATURATION: 100 % | RESPIRATION RATE: 27 BRPM | HEART RATE: 69 BPM | SYSTOLIC BLOOD PRESSURE: 139 MMHG

## 2023-06-01 LAB
A1C WITH ESTIMATED AVERAGE GLUCOSE RESULT: 7 % — HIGH (ref 4–5.6)
ALBUMIN SERPL ELPH-MCNC: 3.6 G/DL — SIGNIFICANT CHANGE UP (ref 3.3–5)
ALP SERPL-CCNC: 43 U/L — SIGNIFICANT CHANGE UP (ref 40–120)
ALT FLD-CCNC: 13 U/L — SIGNIFICANT CHANGE UP (ref 4–33)
ANION GAP SERPL CALC-SCNC: 14 MMOL/L — SIGNIFICANT CHANGE UP (ref 7–14)
AST SERPL-CCNC: 25 U/L — SIGNIFICANT CHANGE UP (ref 4–32)
BILIRUB SERPL-MCNC: 0.3 MG/DL — SIGNIFICANT CHANGE UP (ref 0.2–1.2)
BLD GP AB SCN SERPL QL: NEGATIVE — SIGNIFICANT CHANGE UP
BUN SERPL-MCNC: 35 MG/DL — HIGH (ref 7–23)
CALCIUM SERPL-MCNC: 8.5 MG/DL — SIGNIFICANT CHANGE UP (ref 8.4–10.5)
CHLORIDE SERPL-SCNC: 92 MMOL/L — LOW (ref 98–107)
CO2 SERPL-SCNC: 20 MMOL/L — LOW (ref 22–31)
CREAT SERPL-MCNC: 2.48 MG/DL — HIGH (ref 0.5–1.3)
EGFR: 18 ML/MIN/1.73M2 — LOW
ESTIMATED AVERAGE GLUCOSE: 154 — SIGNIFICANT CHANGE UP
GLUCOSE SERPL-MCNC: 181 MG/DL — HIGH (ref 70–99)
MAGNESIUM SERPL-MCNC: 1.8 MG/DL — SIGNIFICANT CHANGE UP (ref 1.6–2.6)
MRSA PCR RESULT.: SIGNIFICANT CHANGE UP
PHOSPHATE SERPL-MCNC: 5.9 MG/DL — HIGH (ref 2.5–4.5)
POTASSIUM SERPL-MCNC: 7.2 MMOL/L — CRITICAL HIGH (ref 3.5–5.3)
POTASSIUM SERPL-SCNC: 7.2 MMOL/L — CRITICAL HIGH (ref 3.5–5.3)
PROCALCITONIN SERPL-MCNC: 0.3 NG/ML — HIGH (ref 0.02–0.1)
PROT SERPL-MCNC: 6.5 G/DL — SIGNIFICANT CHANGE UP (ref 6–8.3)
RH IG SCN BLD-IMP: NEGATIVE — SIGNIFICANT CHANGE UP
S AUREUS DNA NOSE QL NAA+PROBE: DETECTED
SODIUM SERPL-SCNC: 126 MMOL/L — LOW (ref 135–145)
TSH SERPL-MCNC: 1.37 UIU/ML — SIGNIFICANT CHANGE UP (ref 0.27–4.2)

## 2023-06-01 PROCEDURE — 99239 HOSP IP/OBS DSCHRG MGMT >30: CPT | Mod: GC

## 2023-06-01 PROCEDURE — 93010 ELECTROCARDIOGRAM REPORT: CPT

## 2023-06-01 RX ORDER — IPRATROPIUM/ALBUTEROL SULFATE 18-103MCG
3 AEROSOL WITH ADAPTER (GRAM) INHALATION EVERY 6 HOURS
Refills: 0 | Status: DISCONTINUED | OUTPATIENT
Start: 2023-06-01 | End: 2023-06-01

## 2023-06-01 RX ORDER — ALBUTEROL 90 UG/1
10 AEROSOL, METERED ORAL ONCE
Refills: 0 | Status: DISCONTINUED | OUTPATIENT
Start: 2023-06-01 | End: 2023-06-01

## 2023-06-01 RX ORDER — DEXTROSE 50 % IN WATER 50 %
25 SYRINGE (ML) INTRAVENOUS ONCE
Refills: 0 | Status: COMPLETED | OUTPATIENT
Start: 2023-06-01 | End: 2023-06-01

## 2023-06-01 RX ORDER — DEXTROSE 50 % IN WATER 50 %
50 SYRINGE (ML) INTRAVENOUS ONCE
Refills: 0 | Status: DISCONTINUED | OUTPATIENT
Start: 2023-06-01 | End: 2023-06-01

## 2023-06-01 RX ORDER — INSULIN HUMAN 100 [IU]/ML
10 INJECTION, SOLUTION SUBCUTANEOUS ONCE
Refills: 0 | Status: DISCONTINUED | OUTPATIENT
Start: 2023-06-01 | End: 2023-06-01

## 2023-06-01 RX ORDER — MUPIROCIN 20 MG/G
1 OINTMENT TOPICAL
Refills: 0 | Status: DISCONTINUED | OUTPATIENT
Start: 2023-06-01 | End: 2023-06-01

## 2023-06-01 RX ORDER — INSULIN HUMAN 100 [IU]/ML
5 INJECTION, SOLUTION SUBCUTANEOUS ONCE
Refills: 0 | Status: COMPLETED | OUTPATIENT
Start: 2023-06-01 | End: 2023-06-01

## 2023-06-01 RX ADMIN — INSULIN HUMAN 5 UNIT(S): 100 INJECTION, SOLUTION SUBCUTANEOUS at 09:41

## 2023-06-01 RX ADMIN — Medication 2: at 05:31

## 2023-06-01 RX ADMIN — Medication 25 MILLILITER(S): at 09:35

## 2023-06-01 RX ADMIN — PIPERACILLIN AND TAZOBACTAM 25 GRAM(S): 4; .5 INJECTION, POWDER, LYOPHILIZED, FOR SOLUTION INTRAVENOUS at 05:03

## 2023-06-01 RX ADMIN — AZITHROMYCIN 255 MILLIGRAM(S): 500 TABLET, FILM COATED ORAL at 05:04

## 2023-06-01 RX ADMIN — Medication 1 PATCH: at 09:32

## 2023-06-01 RX ADMIN — Medication 40 MILLIGRAM(S): at 05:04

## 2023-06-01 NOTE — PROGRESS NOTE ADULT - ASSESSMENT
82 yo F w/ PMH HTN, HLD, hypothyroidism, mod AS, CHF, CKD, admit from [facility] (dc'd from [facility] after admit for respiratory distress 2/2 RSV+) iso dyspnea, lethargy. Of note COVID+ 2 days ago, initiated on azithromycin admitted to medicine for further management,  84 yo F w/ PMH HTN, HLD, hypothyroidism, mod AS, CHF, CKD, admit from [facility] (dc'd from [facility] after admit for respiratory distress 2/2 RSV+) iso dyspnea, lethargy. Of note COVID+ 2 days ago, initiated on azithromycin admitted to medicine for further management,

## 2023-06-01 NOTE — DISCHARGE NOTE FOR THE EXPIRED PATIENT - HOSPITAL COURSE
82 yo F w/ PMH HTN, HLD, hypothyroidism, mod AS, CHF, CKD, admit from [facility] (dc'd from [facility] after admit for respiratory distress 2/2 RSV+) iso dyspnea, lethargy. Of note COVID+ on 5/29/23, initiated on azithromycin per ED documentation. Pt was started on BiPAP after MICU evaluation w/ VBG showing 7.06/112/65/32 in the setting of likely COVID PNA w/ superimposed hypervolemia (proBNP 15420). Pt was treated empirically for COVID w/ decadron. Pt was also treated empirically w/ Zosyn and azithromycin. Pt's course complicated by renal failure. Pt eventually desaturated to 70s and was bradycardic to HR 40s. RRT called and MAR pronounced pt's death at 11:22. Pt's death was caused by cardiopulmonary arrest due to acute hypercapnic respiratory failure iso COVID PNA.

## 2023-06-01 NOTE — PROGRESS NOTE ADULT - ATTENDING COMMENTS
Patient seen and examined at bedside. In brief,  patient is a 83 female with history of  HTN, HLD, hypothyroidism, mod AS, CHF, CKD, recently discharged from  [facility]  for respiratory distress 2/2 RSV, presenting from [facility] for respiratory distress and lethargy. Patient found to be  hypoxic with  hyponatremia to 130, hyperkalemia to 5.8, elevated troponin 64. elevated BNP of 15,958, and elevated bicarb to 112, with + COVID positive c/w   hypercapnic respiratory failure,  Prognosis guarded   DNI/DNR are seen in paperwork from facility

## 2023-06-01 NOTE — PROGRESS NOTE ADULT - SUBJECTIVE AND OBJECTIVE BOX
Internal Medicine   Cece Julienne | PGY-1    OVERNIGHT EVENTS: No acute overnight events.    SUBJECTIVE:       MEDICATIONS  (STANDING):  albuterol/ipratropium for Nebulization 3 milliLiter(s) Nebulizer every 6 hours  azithromycin  IVPB 500 milliGRAM(s) IV Intermittent every 24 hours  cloNIDine Patch 0.2 mG/24Hr(s) 1 patch Transdermal <User Schedule>  dexAMETHasone     Tablet 6 milliGRAM(s) Oral daily  dextrose 5%. 1000 milliLiter(s) (100 mL/Hr) IV Continuous <Continuous>  dextrose 5%. 1000 milliLiter(s) (50 mL/Hr) IV Continuous <Continuous>  dextrose 50% Injectable 12.5 Gram(s) IV Push once  dextrose 50% Injectable 25 Gram(s) IV Push once  dextrose 50% Injectable 25 Gram(s) IV Push once  epoetin prabha-epbx (RETACRIT) Injectable 38131 Unit(s) IV Push every 7 days  furosemide   Injectable 40 milliGRAM(s) IV Push daily  glucagon  Injectable 1 milliGRAM(s) IntraMuscular once  insulin lispro (ADMELOG) corrective regimen sliding scale   SubCutaneous three times a day before meals  insulin lispro (ADMELOG) corrective regimen sliding scale   SubCutaneous at bedtime  levothyroxine Injectable 17.5 MICROGram(s) IV Push at bedtime  piperacillin/tazobactam IVPB.. 3.375 Gram(s) IV Intermittent every 12 hours    MEDICATIONS  (PRN):  dextrose Oral Gel 15 Gram(s) Oral once PRN Blood Glucose LESS THAN 70 milliGRAM(s)/deciliter        T(F): 98 (06-01-23 @ 05:00), Max: 98.2 (05-31-23 @ 22:00)  HR: 87 (06-01-23 @ 06:50) (72 - 110)  BP: 152/72 (06-01-23 @ 05:00) (106/83 - 161/80)  BP(mean): --  RR: 28 (06-01-23 @ 05:00) (20 - 40)  SpO2: 96% (06-01-23 @ 06:50) (96% - 100%)    PHYSICAL EXAM:     GENERAL: NAD, lying in bed comfortably  HEAD:  Atraumatic, Normocephalic  EYES: EOMI, PERRLA, conjunctiva and sclera clear, no nystagmus noted  ENT: Moist mucous membranes,   NECK: Supple, No JVD, trachea midline  CHEST/LUNG: Clear to auscultation bilaterally; No rales, rhonchi, wheezing, or rubs. Unlabored respirations  HEART: Regular rate and rhythm; No murmurs, rubs, or gallops, normal S1/S2  ABDOMEN: normal bowel sounds; Soft, nontender, nondistended, no organomegaly   EXTREMITIES:  2+ Peripheral Pulses, brisk capillary refill. No clubbing, cyanosis, or edema  MSK: No gross deformities noted   Neurological:  A&Ox3, no focal deficits   SKIN: No rashes or lesions  PSYCH: Normal mood, affect     TELEMETRY:    LABS:                        8.8    7.07  )-----------( 474      ( 31 May 2023 10:00 )             31.0     05-31    130<L>  |  93<L>  |  32<H>  ----------------------------<  247<H>  5.8<H>   |  25  |  2.29<H>    Ca    8.8      31 May 2023 10:49    TPro  6.9  /  Alb  3.9  /  TBili  0.2  /  DBili  x   /  AST  6   /  ALT  8   /  AlkPhos  48  05-31        PT/INR - ( 31 May 2023 10:00 )   PT: 14.7 sec;   INR: 1.26 ratio         PTT - ( 31 May 2023 10:00 )  PTT:30.3 sec    Creatinine Trend: 2.29<--  I&O's Summary    31 May 2023 07:01  -  01 Jun 2023 06:59  --------------------------------------------------------  IN: 0 mL / OUT: 50 mL / NET: -50 mL      BNP    RADIOLOGY & ADDITIONAL STUDIES:             Internal Medicine   Cece Robins | PGY-1    OVERNIGHT EVENTS: No acute overnight events.    SUBJECTIVE: Patient was seen and examined at bedside this morning.  Pt AOx0. Vital signs/imaging/telemetry events reviewed.       MEDICATIONS  (STANDING):  albuterol/ipratropium for Nebulization 3 milliLiter(s) Nebulizer every 6 hours  azithromycin  IVPB 500 milliGRAM(s) IV Intermittent every 24 hours  cloNIDine Patch 0.2 mG/24Hr(s) 1 patch Transdermal <User Schedule>  dexAMETHasone     Tablet 6 milliGRAM(s) Oral daily  dextrose 5%. 1000 milliLiter(s) (100 mL/Hr) IV Continuous <Continuous>  dextrose 5%. 1000 milliLiter(s) (50 mL/Hr) IV Continuous <Continuous>  dextrose 50% Injectable 12.5 Gram(s) IV Push once  dextrose 50% Injectable 25 Gram(s) IV Push once  dextrose 50% Injectable 25 Gram(s) IV Push once  epoetin prabha-epbx (RETACRIT) Injectable 41094 Unit(s) IV Push every 7 days  furosemide   Injectable 40 milliGRAM(s) IV Push daily  glucagon  Injectable 1 milliGRAM(s) IntraMuscular once  insulin lispro (ADMELOG) corrective regimen sliding scale   SubCutaneous three times a day before meals  insulin lispro (ADMELOG) corrective regimen sliding scale   SubCutaneous at bedtime  levothyroxine Injectable 17.5 MICROGram(s) IV Push at bedtime  piperacillin/tazobactam IVPB.. 3.375 Gram(s) IV Intermittent every 12 hours    MEDICATIONS  (PRN):  dextrose Oral Gel 15 Gram(s) Oral once PRN Blood Glucose LESS THAN 70 milliGRAM(s)/deciliter        T(F): 98 (06-01-23 @ 05:00), Max: 98.2 (05-31-23 @ 22:00)  HR: 87 (06-01-23 @ 06:50) (72 - 110)  BP: 152/72 (06-01-23 @ 05:00) (106/83 - 161/80)  BP(mean): --  RR: 28 (06-01-23 @ 05:00) (20 - 40)  SpO2: 96% (06-01-23 @ 06:50) (96% - 100%)    PHYSICAL EXAM:     GENERAL: +lynch catheter in place draining yellow urine; +CPAP in place  HEAD:  Atraumatic, Normocephalic  EYES: EOMI, PERRLA, conjunctiva and sclera clear  ENT: Moist mucous membranes  NECK: Supple, No JVD  CHEST/LUNG: +esequiel reduced breath sounds w/ wheezing and accessory muscle use  HEART: tachycardic and irregular rhythm; No murmurs, rubs, or gallops  ABDOMEN: normal bowel sounds; Soft, nontender, nondistended  EXTREMITIES:  Esequiel 2-3+ pitting edema to knees; 2+ Peripheral Pulses, brisk capillary refill. No clubbing or cyanosis  Neurological:  A&Ox0, no focal deficits, unable to follow commands. Opens eyes to sternal rub  SKIN: +sacral ulcer  PSYCH: normal affect and mood  TELEMETRY:    LABS:                        8.8    7.07  )-----------( 474      ( 31 May 2023 10:00 )             31.0     05-31    130<L>  |  93<L>  |  32<H>  ----------------------------<  247<H>  5.8<H>   |  25  |  2.29<H>    Ca    8.8      31 May 2023 10:49    TPro  6.9  /  Alb  3.9  /  TBili  0.2  /  DBili  x   /  AST  6   /  ALT  8   /  AlkPhos  48  05-31        PT/INR - ( 31 May 2023 10:00 )   PT: 14.7 sec;   INR: 1.26 ratio         PTT - ( 31 May 2023 10:00 )  PTT:30.3 sec    Creatinine Trend: 2.29<--  I&O's Summary    31 May 2023 07:01  -  01 Jun 2023 06:59  --------------------------------------------------------  IN: 0 mL / OUT: 50 mL / NET: -50 mL      BNP    RADIOLOGY & ADDITIONAL STUDIES:             Internal Medicine   Cece Robins | PGY-1    OVERNIGHT EVENTS: No acute overnight events.    SUBJECTIVE: Patient was seen and examined at bedside this morning.  Pt AOx0. Pt less alert than yesterday. Vital signs/imaging/telemetry events reviewed.       MEDICATIONS  (STANDING):  albuterol/ipratropium for Nebulization 3 milliLiter(s) Nebulizer every 6 hours  azithromycin  IVPB 500 milliGRAM(s) IV Intermittent every 24 hours  cloNIDine Patch 0.2 mG/24Hr(s) 1 patch Transdermal <User Schedule>  dexAMETHasone     Tablet 6 milliGRAM(s) Oral daily  dextrose 5%. 1000 milliLiter(s) (100 mL/Hr) IV Continuous <Continuous>  dextrose 5%. 1000 milliLiter(s) (50 mL/Hr) IV Continuous <Continuous>  dextrose 50% Injectable 12.5 Gram(s) IV Push once  dextrose 50% Injectable 25 Gram(s) IV Push once  dextrose 50% Injectable 25 Gram(s) IV Push once  epoetin prabha-epbx (RETACRIT) Injectable 40303 Unit(s) IV Push every 7 days  furosemide   Injectable 40 milliGRAM(s) IV Push daily  glucagon  Injectable 1 milliGRAM(s) IntraMuscular once  insulin lispro (ADMELOG) corrective regimen sliding scale   SubCutaneous three times a day before meals  insulin lispro (ADMELOG) corrective regimen sliding scale   SubCutaneous at bedtime  levothyroxine Injectable 17.5 MICROGram(s) IV Push at bedtime  piperacillin/tazobactam IVPB.. 3.375 Gram(s) IV Intermittent every 12 hours    MEDICATIONS  (PRN):  dextrose Oral Gel 15 Gram(s) Oral once PRN Blood Glucose LESS THAN 70 milliGRAM(s)/deciliter        T(F): 98 (06-01-23 @ 05:00), Max: 98.2 (05-31-23 @ 22:00)  HR: 87 (06-01-23 @ 06:50) (72 - 110)  BP: 152/72 (06-01-23 @ 05:00) (106/83 - 161/80)  BP(mean): --  RR: 28 (06-01-23 @ 05:00) (20 - 40)  SpO2: 96% (06-01-23 @ 06:50) (96% - 100%)    PHYSICAL EXAM:     GENERAL: +lynch catheter in place draining yellow urine; +CPAP in place  HEAD:  Atraumatic, Normocephalic  EYES: EOMI, PERRLA, conjunctiva and sclera clear  ENT: Moist mucous membranes  NECK: Supple, No JVD  CHEST/LUNG: +esequiel reduced breath sounds w/ wheezing and accessory muscle use  HEART: tachycardic and irregular rhythm; No murmurs, rubs, or gallops  ABDOMEN: normal bowel sounds; Soft, nontender, nondistended  EXTREMITIES:  Esequiel 2-3+ pitting edema to knees; 2+ Peripheral Pulses, brisk capillary refill. No clubbing or cyanosis  Neurological:  A&Ox0, no focal deficits, unable to follow commands. Opens eyes to sternal rub  SKIN: +sacral ulcer  PSYCH: normal affect and mood  TELEMETRY: A fib 94    LABS:                        8.8    7.07  )-----------( 474      ( 31 May 2023 10:00 )             31.0     05-31    130<L>  |  93<L>  |  32<H>  ----------------------------<  247<H>  5.8<H>   |  25  |  2.29<H>    Ca    8.8      31 May 2023 10:49    TPro  6.9  /  Alb  3.9  /  TBili  0.2  /  DBili  x   /  AST  6   /  ALT  8   /  AlkPhos  48  05-31        PT/INR - ( 31 May 2023 10:00 )   PT: 14.7 sec;   INR: 1.26 ratio         PTT - ( 31 May 2023 10:00 )  PTT:30.3 sec    Creatinine Trend: 2.29<--  I&O's Summary    31 May 2023 07:01  -  01 Jun 2023 06:59  --------------------------------------------------------  IN: 0 mL / OUT: 50 mL / NET: -50 mL      BNP    RADIOLOGY & ADDITIONAL STUDIES:

## 2023-06-01 NOTE — PROGRESS NOTE ADULT - PROBLEM SELECTOR PLAN 9
- /114 on admission  - On home amlodipine 5 mg QD, clonidine 0.2 mg BID  - Hold antihypertensive medications  - CTM BP and adjust medications as needed  - IV PRN  - clonidine patch 0.2 mg BID and 100% PO dose  Day 2: keep patch on and 50% PO dose  Day 3: Patch on and 25% PO dose  Day 4 Patch and no further PO dose

## 2023-06-01 NOTE — PROVIDER CONTACT NOTE (OTHER) - ACTION/TREATMENT ORDERED:
Md ordered one time stat order of Metoprolol 5mg IV push. Will continue to monitor Pts on tele
as per MD, MD going to bring up molts and place in chart
MD made aware, Md coming to get ABG blood work on patient
MD made aware Pt has had no urine output from indwelling lynch since arriving to unit. pending orders

## 2023-06-01 NOTE — PROGRESS NOTE ADULT - PROBLEM SELECTOR PLAN 2
- Currently on NIPPV via BiPAP  - VBG on admission: 7.06/112/65/32  - most likely 2/2 COVID+ infection  - C/w BiPAP   - C/w stephanie Q6H - Currently on CPAP  - VBG on admission: 7.06/112/65/32  - most likely 2/2 COVID+ infection  - C/w CPAP  - C/w stephanie Q6H

## 2023-06-01 NOTE — RAPID RESPONSE TEAM SUMMARY - NSSITUATIONBACKGROUNDRRT_GEN_ALL_CORE
82 yo F w/ PMH HTN, HLD, hypothyroidism, mod AS, CHF, CKD, admit from [facility] (dc'd from [facility] after admit for respiratory distress 2/2 RSV+) iso dyspnea, lethargy. Of note COVID+ 2 days ago, initiated on azithromycin admitted to medicine for further management. On arrival to RRT, she is unarousable. VS: HR 33, BP 97/72, RR 18 on AVAPS with rate set at same, SpO2 77%. The patient is DNR/DNI. The family was notified of the patient's imminent condition and she was made comfortable. Shortly after RRT arrival, the monitor showed asystole and the patient did not have a pulse. The AVAPS machine was removed and she did not have spontaneous respirations. She had no heart sounds, her pupils were fixed and dilated, and she did not have a corneal reflex. The patient was pronounced dead at 11:22. The family was notified and were present at bedside shortly after patient expiration.

## 2023-06-01 NOTE — PROVIDER CONTACT NOTE (OTHER) - ASSESSMENT
Pt A&Ox0 unable to assess mental status as pt lethargic and nonverbal on continuos bipap
Pt admitted for acute resp failure

## 2023-06-01 NOTE — PROGRESS NOTE ADULT - PROBLEM SELECTOR PLAN 1
- Met SIRS criteria on admission: hypothermic to 35.8C, tachycardic to 102  - Most likely iso COVID infection  - F/u ucx, bcx, urine legionella, MRSA, urine strep  - Treat empirically for COVID and HAP w/ vanc, zosyn , azithro ([id number])  - C/w decadron x10 days. No remdesivir at this time iso pt's eGFR <20.

## 2023-06-01 NOTE — PROGRESS NOTE ADULT - PROBLEM SELECTOR PLAN 8
- Hgb 8.8 on admission  - C/w home ferrous sulfate 325 mg QD  - Cont to trend CBC daily  - Maintain active type & screen and transfuse for hgb <8

## 2023-06-01 NOTE — PROGRESS NOTE ADULT - PROBLEM SELECTOR PLAN 7
- Fluid overloaded on PE  - BNP 59900 on admission, Trop elevated to 64  - S/p lasix  - F/u TTE  - C/w diuresis   - Monitor strict I/Os, daily standing weights

## 2023-06-05 LAB
CULTURE RESULTS: SIGNIFICANT CHANGE UP
CULTURE RESULTS: SIGNIFICANT CHANGE UP
SPECIMEN SOURCE: SIGNIFICANT CHANGE UP
SPECIMEN SOURCE: SIGNIFICANT CHANGE UP

## 2023-09-18 NOTE — ED ADULT TRIAGE NOTE - NSWEIGHTCALCTOOLDRUG_GEN_A_CORE
Order in for nuclear stress test     I don't know what happened. Thank you for catching that.         used

## 2024-01-05 RX ORDER — ALBUTEROL 90 UG/1
3 AEROSOL, METERED ORAL
Qty: 0 | Refills: 0 | DISCHARGE

## 2024-01-05 RX ORDER — ROSUVASTATIN CALCIUM 5 MG/1
1 TABLET ORAL
Refills: 0 | DISCHARGE

## 2024-01-05 RX ORDER — LEVOTHYROXINE SODIUM 125 MCG
1 TABLET ORAL
Refills: 0 | DISCHARGE

## 2024-01-05 RX ORDER — AMLODIPINE BESYLATE 2.5 MG/1
1 TABLET ORAL
Refills: 0 | DISCHARGE

## 2024-01-05 RX ORDER — ASPIRIN/CALCIUM CARB/MAGNESIUM 324 MG
1 TABLET ORAL
Refills: 0 | DISCHARGE

## 2024-01-05 RX ORDER — METFORMIN HYDROCHLORIDE 850 MG/1
1 TABLET ORAL
Refills: 0 | DISCHARGE

## 2024-01-05 RX ORDER — GLIMEPIRIDE 1 MG
1 TABLET ORAL
Refills: 0 | DISCHARGE

## 2024-11-13 NOTE — PROGRESS NOTE ADULT - PROBLEM SELECTOR PLAN 5
- f/u a1c  - Hold home metformin 500 mg BID while inpt. ISS while in pt  - continue to monitor and adjust as necessary  - DASH/CC diet pending speech and swallow eval
Initial (On Arrival)

## 2025-04-09 NOTE — CHART NOTE - NSCHARTNOTEFT_GEN_A_CORE
DEATH NOTE    Called to bedside to evaluate the patient for oxygen desaturation to the 70s and bradycardia to 40s .     On physical exam, patient did not respond to verbal or noxious stimuli.  No spontaneous respirations.  Absent heart and breath sounds.  Absent radial and carotid pulses.   Pupils are fixed and dilated, no corneal reflex.  EKG rhythm strip shows asystole.   Patient pronounced dead at 11:22.  Attending notified. Pt's family at beside shortly after pt's death.     Cece Robins, PGY-1
not applicable

## 2025-06-02 NOTE — PATIENT PROFILE ADULT - FUNCTIONAL ASSESSMENT - DAILY ACTIVITY 5.
Occupational Therapy    OT Discharge    Patient Name: Юлия Ku  MRN: [id number]  Today's Date: 6/2/2025     Time Calculation  Start Time: 1301  Stop Time: 1341  Time Calculation (min): 40 min    Visit Number: 9  Therapeutic Procedures:      OT Therapeutic Procedures Time Entry  Therapeutic Activity Time Entry: 30  Therapeutic Exercise Time Entry: 10       Current Problem:  1. Abnormal coordination        2. Parkinson's disease without dyskinesia, with fluctuating manifestations  Follow Up In Occupational Therapy      3. Weakness            Subjective     General:   Pt and spouse reporting they are ready for discharge. Stating strength and FMC has improved.      Pain:  Pain Assessment  Pain Assessment: 0-10  0-10 (Numeric) Pain Score: 0 - No pain    Objective       Therapy/Activity:    5/13/25 5/20/25 5/29/25 6/2/2025   Exercises:                               Wrist roll ups 1#DMB flexion and ext 1x10 reps  Wrist roll ups 1#DMB flexion and ext 1x10 reps    Activities:       Pink t-putty       In hand manipulation Palm to finger, finger to palm translation 1x10 PARISH hands. Pt instructed to stack pennies/bingo markers Increased difficulty 2 drops    Ball Manipulation 1x5 minutes with CW and CCW with R hand only 6 drops (hand shaking and fatigue) Palm to finger, finger to palm translation stacking 4x10 PARISH hands. Pt instructed to stack pennies markers Increased difficulty 6 drops    Ball Manipulation 1x5 minutes with CW and CCW with R hand only 3 drops (hand shaking and fatigue)     Theraband       Hand strengthening  30# 2x10 reps with PARISH hands with shoulder at 90 degree holds T putty red/orange PARISH hands:  Grasp 1x5 reps, opposition pinch 1x5 reps and abd 1x5 reps T putty red/orange PARISH hands:  Grasp 1x5 reps, opposition pinch 1x5 reps and abd 1x5 reps     Hand writting work sheets: Name writing, sentences, alphabet   [place] sitting perform panel #3 3 shapes on    Standing with fww and SBA with 6 screws on and  no drops    Weighted pinch clips    1x32 alternating digits with thumb, all resistance weights. Alternating hands.    Hand gripper    30# 3x10 B hands   Purdue peg board    1x5 each hand, palm to finger, finger to palm translation.           HEP provided on       Modalities:                                   Manual:                                   Functional review:        Completed on: 6/2/2025    OT Discharge     Measurements:  Lateral Pinch:   Trials Average   RUE 11, 11, 10 11   LUE 11, 10, 9 10     Three Point Pinch:   Trials Average   RUE 9, 10, 8 9   LUE 6, 6, 8 7     :  Average taken from best 3 measurements.   Trials Average   RUE 28, 26, 27 27   LUE 29, 25, 26 26     Nine Hole Peg Test:  RUE 27 seconds   LUE 27 seconds     Quickdash Scores: 29.55%    OP EDUCATION:  Education  Individual(s) Educated: Patient  Education Provided: Anatomy & Physiology, Diagnosis & Precautions  Home Program: AROM, Fine motor tasks    Assessment:   Occupational Therapy discharge completed this date. Patient reporting they are able to complete ADL and IADL tasks with increased independence. Reporting handwriting continues to be challenging. Pt reporting she has brain fog most days. Has recently stopped medication.  Pt IND with HEP and demos good understanding. Functional assessments completed showing progress with quickdash scores and nine hole peg test indicating overall increased IND with functional tasks. Pt instructed to call with questions/concerns. Will d/c from OT services with continued HEP.     Plan:   Discharge to IND HEP.     Goals:  long covid- occupational therapy Problems       long covid- occupational therapy Problems (Active)       OT Problem       OT-Patient will manage fatigue independently through use of fatigue traffic light and other fatigue strategies to allow participation in ADL/IADL/leisure with fatigue rating of 4/10(yellow) or less.        Start:  03/13/24    Expected End:  06/14/24             Patient will complete complex IADL independently using cognitive and fatigue management strategies        Start:  03/13/24    Expected End:  06/14/24            OT- Patient will use memory and planning system independently as a compensatory tool for management of day, memory and planning.        Start:  03/13/24    Expected End:  06/14/24            Patient will use memory strategies independently to allow improved ability to manage appointments, remember errands,and to improve working memory.       Start:  03/13/24    Expected End:  06/14/24            OT-Patient will demonstrate good recall and understanding of written material through use of PQRST and other reading strategies.        Start:  03/13/24    Expected End:  06/14/24            Patient will complete self care with use of compensatory tools/techniques and energy conservation techniques to allow decreased fatigue..       Start:  03/13/24    Expected End:  06/14/24            Patient will demonstrate improved writing demonstrating 25% improvement in legibility and ability to mentally process writing.       Start:  03/13/24    Expected End:  06/14/24            Patient will demonstrated improved functional communication through use of compensatory tools/techniques and improved executive function.       Start:  03/13/24    Expected End:  06/14/24            Patient will participate in Leisure tasks independently including walking through use of fatigue management, energy conservation and use of compensatory tools/techniques.       Start:  03/13/24    Expected End:  06/14/24            Patient with demonstrate 4+/5 bilateral UE strength and independent HEP completion.       Start:  03/13/24                     OT EVAL 5/5/25 Problems       OT EVAL 5/5/25 Problems (Resolved)       OT Goals       LTG - Patient will indicate/ demonstrate the ability to resume all preinjury ADLs and IADLs without significant limits secondary to decreased ROM, decreased strength  and/or pain as indicated by Quickdash score of less than 25%.  (Adequate for Discharge)       Start:  05/05/25    Expected End:  07/28/25    Resolved:  06/02/25         Develop and issue HEP to help maximize ROM, strength and tolerance to help maximize return to all pre-onset activities.  (Met)       Start:  05/05/25    Expected End:  07/28/25    Resolved:  06/02/25         Pt will demonstrate increased  strength as appropriate with the B  to increase by 2-5# to help patient resume ADLs and IADLs.' (Not met)       Start:  05/05/25    Expected End:  07/28/25    Resolved:  06/02/25         Pt will demonstrate increased FMC in R hand as indicated by increased 9HPT score 2-5 seconds as compared to L hand.  (Met)       Start:  05/05/25    Expected End:  07/28/25    Resolved:  06/02/25         Pt will demonstrate increased strength in BUEs to 4+/5 throughout as needed to complete ADL tasks with increased independence.  (Adequate for Discharge)       Start:  05/05/25    Expected End:  07/28/25    Resolved:  06/02/25                     1 = Total assistance